# Patient Record
Sex: FEMALE | Race: BLACK OR AFRICAN AMERICAN | Employment: UNEMPLOYED | URBAN - METROPOLITAN AREA
[De-identification: names, ages, dates, MRNs, and addresses within clinical notes are randomized per-mention and may not be internally consistent; named-entity substitution may affect disease eponyms.]

---

## 2018-08-06 PROCEDURE — 99284 EMERGENCY DEPT VISIT MOD MDM: CPT | Performed by: EMERGENCY MEDICINE

## 2018-08-07 ENCOUNTER — APPOINTMENT (OUTPATIENT)
Dept: GENERAL RADIOLOGY | Age: 58
End: 2018-08-07
Attending: EMERGENCY MEDICINE
Payer: COMMERCIAL

## 2018-08-07 ENCOUNTER — HOSPITAL ENCOUNTER (EMERGENCY)
Age: 58
Discharge: HOME | End: 2018-08-07
Attending: EMERGENCY MEDICINE
Payer: COMMERCIAL

## 2018-08-07 VITALS
HEIGHT: 63 IN | TEMPERATURE: 97.9 F | HEART RATE: 100 BPM | WEIGHT: 154 LBS | SYSTOLIC BLOOD PRESSURE: 156 MMHG | BODY MASS INDEX: 27.29 KG/M2 | DIASTOLIC BLOOD PRESSURE: 85 MMHG | OXYGEN SATURATION: 100 % | RESPIRATION RATE: 18 BRPM

## 2018-08-07 DIAGNOSIS — S39.012A BACK STRAIN, INITIAL ENCOUNTER: Primary | ICD-10-CM

## 2018-08-07 LAB
BACTERIA URNS QL MICRO: 0 /HPF
CASTS URNS QL MICRO: NORMAL /LPF
EPI CELLS #/AREA URNS HPF: NORMAL /HPF
RBC #/AREA URNS HPF: NORMAL /HPF
WBC URNS QL MICRO: NORMAL /HPF

## 2018-08-07 PROCEDURE — 81003 URINALYSIS AUTO W/O SCOPE: CPT | Performed by: EMERGENCY MEDICINE

## 2018-08-07 PROCEDURE — 72100 X-RAY EXAM L-S SPINE 2/3 VWS: CPT

## 2018-08-07 PROCEDURE — 81015 MICROSCOPIC EXAM OF URINE: CPT

## 2018-08-07 PROCEDURE — 87086 URINE CULTURE/COLONY COUNT: CPT

## 2018-08-07 RX ORDER — NAPROXEN 500 MG/1
500 TABLET ORAL AS NEEDED
Qty: 20 TAB | Refills: 0 | Status: SHIPPED | OUTPATIENT
Start: 2018-08-07 | End: 2021-01-01

## 2018-08-07 RX ORDER — METAXALONE 800 MG/1
800 TABLET ORAL 3 TIMES DAILY
Qty: 20 TAB | Refills: 0 | Status: SHIPPED | OUTPATIENT
Start: 2018-08-07 | End: 2021-01-01

## 2018-08-07 NOTE — ED NOTES
I have reviewed discharge instructions with the patient. The patient verbalized understanding. Patient left ED via Discharge Method: ambulatory to Home with self. Opportunity for questions and clarification provided. Patient given 2 scripts. To continue your aftercare when you leave the hospital, you may receive an automated call from our care team to check in on how you are doing. This is a free service and part of our promise to provide the best care and service to meet your aftercare needs.  If you have questions, or wish to unsubscribe from this service please call [phone]. Thank you for Choosing our [facility] Emergency Department.

## 2018-08-07 NOTE — DISCHARGE INSTRUCTIONS
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
## 2018-08-09 LAB
BACTERIA SPEC CULT: NORMAL
BACTERIA SPEC CULT: NORMAL
SERVICE CMNT-IMP: NORMAL

## 2018-11-19 VITALS
SYSTOLIC BLOOD PRESSURE: 162 MMHG | HEIGHT: 63 IN | HEART RATE: 101 BPM | DIASTOLIC BLOOD PRESSURE: 84 MMHG | OXYGEN SATURATION: 99 % | WEIGHT: 154 LBS | TEMPERATURE: 98.3 F | RESPIRATION RATE: 18 BRPM | BODY MASS INDEX: 27.29 KG/M2

## 2018-11-19 PROCEDURE — 75810000275 HC EMERGENCY DEPT VISIT NO LEVEL OF CARE: Performed by: EMERGENCY MEDICINE

## 2018-11-20 ENCOUNTER — HOSPITAL ENCOUNTER (EMERGENCY)
Age: 58
Discharge: LEFT BEFORE DISCHARGE | End: 2018-11-20
Attending: EMERGENCY MEDICINE
Payer: SELF-PAY
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
## 2021-01-01 ENCOUNTER — APPOINTMENT (OUTPATIENT)
Dept: GENERAL RADIOLOGY | Age: 61
DRG: 720 | End: 2021-01-01
Attending: NURSE PRACTITIONER
Payer: MEDICAID

## 2021-01-01 ENCOUNTER — HOSPITAL ENCOUNTER (INPATIENT)
Age: 61
LOS: 6 days | Discharge: HOME | DRG: 194 | End: 2021-04-15
Attending: EMERGENCY MEDICINE | Admitting: INTERNAL MEDICINE
Payer: MEDICAID

## 2021-01-01 ENCOUNTER — APPOINTMENT (OUTPATIENT)
Dept: CT IMAGING | Age: 61
DRG: 137 | End: 2021-01-01
Attending: EMERGENCY MEDICINE
Payer: MEDICAID

## 2021-01-01 ENCOUNTER — HOSPITAL ENCOUNTER (INPATIENT)
Age: 61
LOS: 1 days | DRG: 720 | End: 2021-09-22
Attending: EMERGENCY MEDICINE | Admitting: STUDENT IN AN ORGANIZED HEALTH CARE EDUCATION/TRAINING PROGRAM
Payer: MEDICAID

## 2021-01-01 ENCOUNTER — HOSPITAL ENCOUNTER (INPATIENT)
Age: 61
LOS: 2 days | Discharge: HOME | DRG: 137 | End: 2021-09-19
Attending: EMERGENCY MEDICINE | Admitting: HOSPITALIST
Payer: MEDICAID

## 2021-01-01 ENCOUNTER — APPOINTMENT (OUTPATIENT)
Dept: GENERAL RADIOLOGY | Age: 61
DRG: 720 | End: 2021-01-01
Attending: INTERNAL MEDICINE
Payer: MEDICAID

## 2021-01-01 ENCOUNTER — APPOINTMENT (OUTPATIENT)
Dept: ULTRASOUND IMAGING | Age: 61
DRG: 194 | End: 2021-01-01
Attending: NURSE PRACTITIONER
Payer: MEDICAID

## 2021-01-01 ENCOUNTER — HOSPITAL ENCOUNTER (INPATIENT)
Age: 61
LOS: 2 days | Discharge: HOME | DRG: 425 | End: 2021-07-26
Attending: EMERGENCY MEDICINE | Admitting: FAMILY MEDICINE
Payer: MEDICAID

## 2021-01-01 ENCOUNTER — APPOINTMENT (OUTPATIENT)
Dept: GENERAL RADIOLOGY | Age: 61
DRG: 720 | End: 2021-01-01
Attending: EMERGENCY MEDICINE
Payer: MEDICAID

## 2021-01-01 ENCOUNTER — APPOINTMENT (OUTPATIENT)
Dept: GENERAL RADIOLOGY | Age: 61
DRG: 194 | End: 2021-01-01
Attending: EMERGENCY MEDICINE
Payer: MEDICAID

## 2021-01-01 ENCOUNTER — HOSPITAL ENCOUNTER (EMERGENCY)
Age: 61
Discharge: HOME | End: 2021-08-21
Attending: EMERGENCY MEDICINE
Payer: MEDICAID

## 2021-01-01 ENCOUNTER — APPOINTMENT (OUTPATIENT)
Dept: CT IMAGING | Age: 61
DRG: 720 | End: 2021-01-01
Attending: STUDENT IN AN ORGANIZED HEALTH CARE EDUCATION/TRAINING PROGRAM
Payer: MEDICAID

## 2021-01-01 ENCOUNTER — APPOINTMENT (OUTPATIENT)
Dept: INTERVENTIONAL RADIOLOGY/VASCULAR | Age: 61
DRG: 194 | End: 2021-01-01
Attending: INTERNAL MEDICINE
Payer: MEDICAID

## 2021-01-01 ENCOUNTER — APPOINTMENT (OUTPATIENT)
Dept: GENERAL RADIOLOGY | Age: 61
DRG: 137 | End: 2021-01-01
Attending: EMERGENCY MEDICINE
Payer: MEDICAID

## 2021-01-01 VITALS
DIASTOLIC BLOOD PRESSURE: 67 MMHG | HEIGHT: 63 IN | SYSTOLIC BLOOD PRESSURE: 127 MMHG | RESPIRATION RATE: 16 BRPM | HEART RATE: 88 BPM | TEMPERATURE: 97.4 F | WEIGHT: 154 LBS | BODY MASS INDEX: 27.29 KG/M2 | OXYGEN SATURATION: 98 %

## 2021-01-01 VITALS
RESPIRATION RATE: 16 BRPM | HEIGHT: 63 IN | BODY MASS INDEX: 27.29 KG/M2 | SYSTOLIC BLOOD PRESSURE: 134 MMHG | WEIGHT: 154 LBS | HEART RATE: 75 BPM | TEMPERATURE: 98.5 F | DIASTOLIC BLOOD PRESSURE: 66 MMHG | OXYGEN SATURATION: 92 %

## 2021-01-01 VITALS
BODY MASS INDEX: 24.8 KG/M2 | HEIGHT: 63 IN | TEMPERATURE: 98.4 F | DIASTOLIC BLOOD PRESSURE: 26 MMHG | SYSTOLIC BLOOD PRESSURE: 41 MMHG | RESPIRATION RATE: 5 BRPM | WEIGHT: 140 LBS | OXYGEN SATURATION: 76 %

## 2021-01-01 VITALS
HEIGHT: 63 IN | RESPIRATION RATE: 17 BRPM | SYSTOLIC BLOOD PRESSURE: 168 MMHG | BODY MASS INDEX: 24.77 KG/M2 | WEIGHT: 139.77 LBS | HEART RATE: 98 BPM | OXYGEN SATURATION: 97 % | DIASTOLIC BLOOD PRESSURE: 87 MMHG | TEMPERATURE: 98.8 F

## 2021-01-01 VITALS
HEART RATE: 78 BPM | OXYGEN SATURATION: 98 % | HEIGHT: 63 IN | RESPIRATION RATE: 19 BRPM | WEIGHT: 134.2 LBS | SYSTOLIC BLOOD PRESSURE: 128 MMHG | DIASTOLIC BLOOD PRESSURE: 72 MMHG | BODY MASS INDEX: 23.78 KG/M2 | TEMPERATURE: 97.9 F

## 2021-01-01 DIAGNOSIS — Z99.2 DIALYSIS PATIENT (HCC): ICD-10-CM

## 2021-01-01 DIAGNOSIS — N18.6 CONTROLLED TYPE 2 DIABETES MELLITUS WITH CHRONIC KIDNEY DISEASE ON CHRONIC DIALYSIS, UNSPECIFIED WHETHER LONG TERM INSULIN USE (HCC): ICD-10-CM

## 2021-01-01 DIAGNOSIS — Z71.89 ADVANCED CARE PLANNING/COUNSELING DISCUSSION: ICD-10-CM

## 2021-01-01 DIAGNOSIS — A41.9 SEVERE SEPSIS WITH SEPTIC SHOCK (HCC): ICD-10-CM

## 2021-01-01 DIAGNOSIS — U07.1 COVID-19: Primary | ICD-10-CM

## 2021-01-01 DIAGNOSIS — R09.02 HYPOXIA: ICD-10-CM

## 2021-01-01 DIAGNOSIS — Z51.5 ENCOUNTER FOR PALLIATIVE CARE: ICD-10-CM

## 2021-01-01 DIAGNOSIS — Z99.2 CONTROLLED TYPE 2 DIABETES MELLITUS WITH CHRONIC KIDNEY DISEASE ON CHRONIC DIALYSIS, UNSPECIFIED WHETHER LONG TERM INSULIN USE (HCC): ICD-10-CM

## 2021-01-01 DIAGNOSIS — R54 FRAILTY: ICD-10-CM

## 2021-01-01 DIAGNOSIS — K52.9 COLITIS: ICD-10-CM

## 2021-01-01 DIAGNOSIS — R65.21 SEVERE SEPSIS WITH SEPTIC SHOCK (HCC): ICD-10-CM

## 2021-01-01 DIAGNOSIS — E87.5 ACUTE HYPERKALEMIA: ICD-10-CM

## 2021-01-01 DIAGNOSIS — J96.01 ACUTE HYPOXEMIC RESPIRATORY FAILURE (HCC): ICD-10-CM

## 2021-01-01 DIAGNOSIS — N18.9 CHRONIC RENAL FAILURE, UNSPECIFIED CKD STAGE: ICD-10-CM

## 2021-01-01 DIAGNOSIS — E87.20 LACTIC ACIDOSIS: ICD-10-CM

## 2021-01-01 DIAGNOSIS — R65.20 SEVERE SEPSIS (HCC): Primary | ICD-10-CM

## 2021-01-01 DIAGNOSIS — Z99.2 ESRD NEEDING DIALYSIS (HCC): ICD-10-CM

## 2021-01-01 DIAGNOSIS — E11.22 CONTROLLED TYPE 2 DIABETES MELLITUS WITH CHRONIC KIDNEY DISEASE ON CHRONIC DIALYSIS, UNSPECIFIED WHETHER LONG TERM INSULIN USE (HCC): ICD-10-CM

## 2021-01-01 DIAGNOSIS — Z51.5 END OF LIFE CARE: ICD-10-CM

## 2021-01-01 DIAGNOSIS — I16.0 HYPERTENSIVE URGENCY: Primary | ICD-10-CM

## 2021-01-01 DIAGNOSIS — J81.0 ACUTE PULMONARY EDEMA (HCC): ICD-10-CM

## 2021-01-01 DIAGNOSIS — U07.1 PNEUMONIA DUE TO COVID-19 VIRUS: ICD-10-CM

## 2021-01-01 DIAGNOSIS — R73.9 HYPERGLYCEMIA: Primary | ICD-10-CM

## 2021-01-01 DIAGNOSIS — D72.19 OTHER EOSINOPHILIA: ICD-10-CM

## 2021-01-01 DIAGNOSIS — A41.9 SEVERE SEPSIS (HCC): Primary | ICD-10-CM

## 2021-01-01 DIAGNOSIS — J12.82 PNEUMONIA DUE TO COVID-19 VIRUS: ICD-10-CM

## 2021-01-01 DIAGNOSIS — R10.9 ACUTE ABDOMINAL PAIN: ICD-10-CM

## 2021-01-01 DIAGNOSIS — R06.00 DYSPNEA, UNSPECIFIED TYPE: ICD-10-CM

## 2021-01-01 DIAGNOSIS — G62.9 NEUROPATHY: Primary | ICD-10-CM

## 2021-01-01 DIAGNOSIS — N18.6 ESRD NEEDING DIALYSIS (HCC): ICD-10-CM

## 2021-01-01 DIAGNOSIS — N18.6 ESRD (END STAGE RENAL DISEASE) (HCC): ICD-10-CM

## 2021-01-01 LAB
25(OH)D3 SERPL-MCNC: 26.6 NG/ML (ref 30–100)
ABO + RH BLD: NORMAL
ACC. NO. FROM MICRO ORDER, ACCP: ABNORMAL
ALBUMIN SERPL-MCNC: 1.7 G/DL (ref 3.2–4.6)
ALBUMIN SERPL-MCNC: 2.7 G/DL (ref 3.2–4.6)
ALBUMIN SERPL-MCNC: 2.9 G/DL (ref 3.2–4.6)
ALBUMIN SERPL-MCNC: 3 G/DL (ref 3.2–4.6)
ALBUMIN SERPL-MCNC: 3.3 G/DL (ref 3.2–4.6)
ALBUMIN SERPL-MCNC: 3.4 G/DL (ref 3.2–4.6)
ALBUMIN SERPL-MCNC: 3.5 G/DL (ref 3.2–4.6)
ALBUMIN SERPL-MCNC: 3.6 G/DL (ref 3.2–4.6)
ALBUMIN/GLOB SERPL: 0.5 {RATIO} (ref 1.2–3.5)
ALBUMIN/GLOB SERPL: 0.5 {RATIO} (ref 1.2–3.5)
ALBUMIN/GLOB SERPL: 0.7 {RATIO} (ref 1.2–3.5)
ALBUMIN/GLOB SERPL: 0.8 {RATIO} (ref 1.2–3.5)
ALBUMIN/GLOB SERPL: 0.9 {RATIO} (ref 1.2–3.5)
ALP SERPL-CCNC: 105 U/L (ref 50–136)
ALP SERPL-CCNC: 129 U/L (ref 50–136)
ALP SERPL-CCNC: 43 U/L (ref 50–136)
ALP SERPL-CCNC: 65 U/L (ref 50–136)
ALP SERPL-CCNC: 72 U/L (ref 50–136)
ALP SERPL-CCNC: 81 U/L (ref 50–136)
ALP SERPL-CCNC: 87 U/L (ref 50–136)
ALP SERPL-CCNC: 97 U/L (ref 50–136)
ALT SERPL-CCNC: 13 U/L (ref 12–65)
ALT SERPL-CCNC: 14 U/L (ref 12–65)
ALT SERPL-CCNC: 15 U/L (ref 12–65)
ALT SERPL-CCNC: 18 U/L (ref 12–65)
ALT SERPL-CCNC: 20 U/L (ref 12–65)
ALT SERPL-CCNC: 30 U/L (ref 12–65)
ANION GAP SERPL CALC-SCNC: 11 MMOL/L (ref 7–16)
ANION GAP SERPL CALC-SCNC: 14 MMOL/L (ref 7–16)
ANION GAP SERPL CALC-SCNC: 16 MMOL/L (ref 7–16)
ANION GAP SERPL CALC-SCNC: 17 MMOL/L (ref 7–16)
ANION GAP SERPL CALC-SCNC: 19 MMOL/L (ref 7–16)
ANION GAP SERPL CALC-SCNC: 6 MMOL/L (ref 7–16)
ANION GAP SERPL CALC-SCNC: 7 MMOL/L (ref 7–16)
ANION GAP SERPL CALC-SCNC: 7 MMOL/L (ref 7–16)
ANION GAP SERPL CALC-SCNC: 8 MMOL/L (ref 7–16)
ANION GAP SERPL CALC-SCNC: 8 MMOL/L (ref 7–16)
ANION GAP SERPL CALC-SCNC: 9 MMOL/L (ref 7–16)
APPEARANCE UR: CLEAR
ARTERIAL PATENCY WRIST A: ABNORMAL
ARTERIAL PATENCY WRIST A: ABNORMAL
ARTERIAL PATENCY WRIST A: NEGATIVE
ARTERIAL PATENCY WRIST A: POSITIVE
AST SERPL-CCNC: 13 U/L (ref 15–37)
AST SERPL-CCNC: 16 U/L (ref 15–37)
AST SERPL-CCNC: 18 U/L (ref 15–37)
AST SERPL-CCNC: 26 U/L (ref 15–37)
AST SERPL-CCNC: 27 U/L (ref 15–37)
AST SERPL-CCNC: 32 U/L (ref 15–37)
AST SERPL-CCNC: 37 U/L (ref 15–37)
AST SERPL-CCNC: 86 U/L (ref 15–37)
ATRIAL RATE: 100 BPM
ATRIAL RATE: 108 BPM
ATRIAL RATE: 85 BPM
ATRIAL RATE: 88 BPM
ATRIAL RATE: 97 BPM
BACTERIA SPEC CULT: NORMAL
BACTERIA SPEC CULT: NORMAL
BACTERIA URNS QL MICRO: 0 /HPF
BACTERIA URNS QL MICRO: NORMAL /HPF
BASE DEFICIT BLD-SCNC: 6.6 MMOL/L
BASE DEFICIT BLD-SCNC: 8 MMOL/L
BASE EXCESS BLD CALC-SCNC: 0.5 MMOL/L
BASE EXCESS BLD CALC-SCNC: 4.3 MMOL/L
BASOPHILS # BLD: 0 K/UL (ref 0–0.2)
BASOPHILS # BLD: 0.1 K/UL (ref 0–0.2)
BASOPHILS NFR BLD: 0 % (ref 0–2)
BASOPHILS NFR BLD: 1 % (ref 0–2)
BDY SITE: ABNORMAL
BILIRUB SERPL-MCNC: 0.2 MG/DL (ref 0.2–1.1)
BILIRUB SERPL-MCNC: 0.3 MG/DL (ref 0.2–1.1)
BILIRUB SERPL-MCNC: 0.3 MG/DL (ref 0.2–1.1)
BILIRUB SERPL-MCNC: 0.4 MG/DL (ref 0.2–1.1)
BILIRUB SERPL-MCNC: 0.5 MG/DL (ref 0.2–1.1)
BILIRUB SERPL-MCNC: 0.5 MG/DL (ref 0.2–1.1)
BILIRUB SERPL-MCNC: 0.8 MG/DL (ref 0.2–1.1)
BILIRUB SERPL-MCNC: 0.9 MG/DL (ref 0.2–1.1)
BILIRUB UR QL: NEGATIVE
BLD PROD TYP BPU: NORMAL
BLOOD GROUP ANTIBODIES SERPL: NORMAL
BNP SERPL-MCNC: ABNORMAL PG/ML (ref 5–125)
BPU ID: NORMAL
BUN SERPL-MCNC: 101 MG/DL (ref 8–23)
BUN SERPL-MCNC: 15 MG/DL (ref 8–23)
BUN SERPL-MCNC: 16 MG/DL (ref 8–23)
BUN SERPL-MCNC: 25 MG/DL (ref 8–23)
BUN SERPL-MCNC: 27 MG/DL (ref 8–23)
BUN SERPL-MCNC: 39 MG/DL (ref 8–23)
BUN SERPL-MCNC: 39 MG/DL (ref 8–23)
BUN SERPL-MCNC: 46 MG/DL (ref 8–23)
BUN SERPL-MCNC: 51 MG/DL (ref 8–23)
BUN SERPL-MCNC: 54 MG/DL (ref 8–23)
BUN SERPL-MCNC: 54 MG/DL (ref 8–23)
BUN SERPL-MCNC: 55 MG/DL (ref 8–23)
BUN SERPL-MCNC: 57 MG/DL (ref 8–23)
BUN SERPL-MCNC: 58 MG/DL (ref 8–23)
BUN SERPL-MCNC: 59 MG/DL (ref 8–23)
BUN SERPL-MCNC: 63 MG/DL (ref 8–23)
CA-I BLD-MCNC: 0.76 MMOL/L (ref 1.12–1.32)
CALCIUM SERPL-MCNC: 7 MG/DL (ref 8.3–10.4)
CALCIUM SERPL-MCNC: 7.2 MG/DL (ref 8.3–10.4)
CALCIUM SERPL-MCNC: 7.3 MG/DL (ref 8.3–10.4)
CALCIUM SERPL-MCNC: 7.5 MG/DL (ref 8.3–10.4)
CALCIUM SERPL-MCNC: 7.5 MG/DL (ref 8.3–10.4)
CALCIUM SERPL-MCNC: 7.9 MG/DL (ref 8.3–10.4)
CALCIUM SERPL-MCNC: 8 MG/DL (ref 8.3–10.4)
CALCIUM SERPL-MCNC: 8.1 MG/DL (ref 8.3–10.4)
CALCIUM SERPL-MCNC: 8.1 MG/DL (ref 8.3–10.4)
CALCIUM SERPL-MCNC: 8.2 MG/DL (ref 8.3–10.4)
CALCIUM SERPL-MCNC: 8.3 MG/DL (ref 8.3–10.4)
CALCIUM SERPL-MCNC: 8.4 MG/DL (ref 8.3–10.4)
CALCIUM SERPL-MCNC: 8.4 MG/DL (ref 8.3–10.4)
CALCULATED P AXIS, ECG09: 71 DEGREES
CALCULATED P AXIS, ECG09: 81 DEGREES
CALCULATED P AXIS, ECG09: 82 DEGREES
CALCULATED P AXIS, ECG09: 84 DEGREES
CALCULATED P AXIS, ECG09: 89 DEGREES
CALCULATED R AXIS, ECG10: -2 DEGREES
CALCULATED R AXIS, ECG10: -9 DEGREES
CALCULATED R AXIS, ECG10: 11 DEGREES
CALCULATED R AXIS, ECG10: 21 DEGREES
CALCULATED R AXIS, ECG10: 3 DEGREES
CALCULATED T AXIS, ECG11: 135 DEGREES
CALCULATED T AXIS, ECG11: 66 DEGREES
CALCULATED T AXIS, ECG11: 71 DEGREES
CALCULATED T AXIS, ECG11: 86 DEGREES
CALCULATED T AXIS, ECG11: 89 DEGREES
CASTS URNS QL MICRO: 0 /LPF
CASTS URNS QL MICRO: ABNORMAL /LPF
CHLORIDE SERPL-SCNC: 100 MMOL/L (ref 98–107)
CHLORIDE SERPL-SCNC: 102 MMOL/L (ref 98–107)
CHLORIDE SERPL-SCNC: 104 MMOL/L (ref 98–107)
CHLORIDE SERPL-SCNC: 105 MMOL/L (ref 98–107)
CHLORIDE SERPL-SCNC: 105 MMOL/L (ref 98–107)
CHLORIDE SERPL-SCNC: 106 MMOL/L (ref 98–107)
CHLORIDE SERPL-SCNC: 106 MMOL/L (ref 98–107)
CHLORIDE SERPL-SCNC: 107 MMOL/L (ref 98–107)
CHLORIDE SERPL-SCNC: 109 MMOL/L (ref 98–107)
CHLORIDE SERPL-SCNC: 109 MMOL/L (ref 98–107)
CHLORIDE SERPL-SCNC: 91 MMOL/L (ref 98–107)
CHLORIDE SERPL-SCNC: 93 MMOL/L (ref 98–107)
CHLORIDE SERPL-SCNC: 94 MMOL/L (ref 98–107)
CHLORIDE SERPL-SCNC: 95 MMOL/L (ref 98–107)
CK SERPL-CCNC: 388 U/L (ref 21–215)
CO2 BLD-SCNC: 26 MMOL/L (ref 13–23)
CO2 BLD-SCNC: 28 MMOL/L (ref 13–23)
CO2 SERPL-SCNC: 19 MMOL/L (ref 21–32)
CO2 SERPL-SCNC: 20 MMOL/L (ref 21–32)
CO2 SERPL-SCNC: 20 MMOL/L (ref 21–32)
CO2 SERPL-SCNC: 21 MMOL/L (ref 21–32)
CO2 SERPL-SCNC: 23 MMOL/L (ref 21–32)
CO2 SERPL-SCNC: 23 MMOL/L (ref 21–32)
CO2 SERPL-SCNC: 24 MMOL/L (ref 21–32)
CO2 SERPL-SCNC: 24 MMOL/L (ref 21–32)
CO2 SERPL-SCNC: 25 MMOL/L (ref 21–32)
CO2 SERPL-SCNC: 26 MMOL/L (ref 21–32)
CO2 SERPL-SCNC: 28 MMOL/L (ref 21–32)
CO2 SERPL-SCNC: 29 MMOL/L (ref 21–32)
CO2 SERPL-SCNC: 31 MMOL/L (ref 21–32)
COLOR UR: YELLOW
COVID-19 RAPID TEST, COVR: DETECTED
COVID-19 RAPID TEST, COVR: NOT DETECTED
CREAT SERPL-MCNC: 13.3 MG/DL (ref 0.6–1)
CREAT SERPL-MCNC: 3.7 MG/DL (ref 0.6–1)
CREAT SERPL-MCNC: 4 MG/DL (ref 0.6–1)
CREAT SERPL-MCNC: 4.76 MG/DL (ref 0.6–1)
CREAT SERPL-MCNC: 5.1 MG/DL (ref 0.6–1)
CREAT SERPL-MCNC: 6.53 MG/DL (ref 0.6–1)
CREAT SERPL-MCNC: 7.27 MG/DL (ref 0.6–1)
CREAT SERPL-MCNC: 7.41 MG/DL (ref 0.6–1)
CREAT SERPL-MCNC: 7.53 MG/DL (ref 0.6–1)
CREAT SERPL-MCNC: 7.7 MG/DL (ref 0.6–1)
CREAT SERPL-MCNC: 7.86 MG/DL (ref 0.6–1)
CREAT SERPL-MCNC: 7.97 MG/DL (ref 0.6–1)
CREAT SERPL-MCNC: 8.06 MG/DL (ref 0.6–1)
CREAT SERPL-MCNC: 8.25 MG/DL (ref 0.6–1)
CREAT SERPL-MCNC: 8.46 MG/DL (ref 0.6–1)
CREAT SERPL-MCNC: 8.49 MG/DL (ref 0.6–1)
CROSSMATCH RESULT,%XM: NORMAL
CRP SERPL-MCNC: 16 MG/DL (ref 0–0.9)
CRYSTALS URNS QL MICRO: 0 /LPF
DIAGNOSIS, 93000: NORMAL
DIFFERENTIAL METHOD BLD: ABNORMAL
DIFFERENTIAL METHOD BLD: NORMAL
EOSINOPHIL # BLD: 0 K/UL (ref 0–0.8)
EOSINOPHIL # BLD: 0.1 K/UL (ref 0–0.8)
EOSINOPHIL # BLD: 0.2 K/UL (ref 0–0.8)
EOSINOPHIL # BLD: 2.8 K/UL (ref 0–0.8)
EOSINOPHIL NFR BLD: 0 % (ref 0.5–7.8)
EOSINOPHIL NFR BLD: 1 % (ref 0.5–7.8)
EOSINOPHIL NFR BLD: 2 % (ref 0.5–7.8)
EOSINOPHIL NFR BLD: 20 % (ref 0.5–7.8)
EOSINOPHIL NFR BLD: 3 % (ref 0.5–7.8)
EPI CELLS #/AREA URNS HPF: ABNORMAL /HPF
EPI CELLS #/AREA URNS HPF: NORMAL /HPF
ERYTHROCYTE [DISTWIDTH] IN BLOOD BY AUTOMATED COUNT: 13.4 % (ref 11.9–14.6)
ERYTHROCYTE [DISTWIDTH] IN BLOOD BY AUTOMATED COUNT: 13.7 % (ref 11.9–14.6)
ERYTHROCYTE [DISTWIDTH] IN BLOOD BY AUTOMATED COUNT: 13.7 % (ref 11.9–14.6)
ERYTHROCYTE [DISTWIDTH] IN BLOOD BY AUTOMATED COUNT: 14 % (ref 11.9–14.6)
ERYTHROCYTE [DISTWIDTH] IN BLOOD BY AUTOMATED COUNT: 14.3 % (ref 11.9–14.6)
ERYTHROCYTE [DISTWIDTH] IN BLOOD BY AUTOMATED COUNT: 14.4 % (ref 11.9–14.6)
ERYTHROCYTE [DISTWIDTH] IN BLOOD BY AUTOMATED COUNT: 14.6 % (ref 11.9–14.6)
ERYTHROCYTE [DISTWIDTH] IN BLOOD BY AUTOMATED COUNT: 14.7 % (ref 11.9–14.6)
ERYTHROCYTE [DISTWIDTH] IN BLOOD BY AUTOMATED COUNT: 14.7 % (ref 11.9–14.6)
ERYTHROCYTE [DISTWIDTH] IN BLOOD BY AUTOMATED COUNT: 14.9 % (ref 11.9–14.6)
ERYTHROCYTE [DISTWIDTH] IN BLOOD BY AUTOMATED COUNT: 15.7 % (ref 11.9–14.6)
ERYTHROCYTE [DISTWIDTH] IN BLOOD BY AUTOMATED COUNT: 18.5 % (ref 11.9–14.6)
ERYTHROCYTE [DISTWIDTH] IN BLOOD BY AUTOMATED COUNT: 18.6 % (ref 11.9–14.6)
ERYTHROCYTE [DISTWIDTH] IN BLOOD BY AUTOMATED COUNT: 18.8 % (ref 11.9–14.6)
ERYTHROCYTE [DISTWIDTH] IN BLOOD BY AUTOMATED COUNT: 19.4 % (ref 11.9–14.6)
EST. AVERAGE GLUCOSE BLD GHB EST-MCNC: 154 MG/DL
FERRITIN SERPL-MCNC: 240 NG/ML (ref 8–388)
FIO2 ON VENT: 100 %
FOLATE SERPL-MCNC: 6.8 NG/ML (ref 3.1–17.5)
GAS FLOW.O2 O2 DELIVERY SYS: ABNORMAL L/MIN
GLOBULIN SER CALC-MCNC: 3.4 G/DL (ref 2.3–3.5)
GLOBULIN SER CALC-MCNC: 3.7 G/DL (ref 2.3–3.5)
GLOBULIN SER CALC-MCNC: 4.2 G/DL (ref 2.3–3.5)
GLOBULIN SER CALC-MCNC: 4.4 G/DL (ref 2.3–3.5)
GLOBULIN SER CALC-MCNC: 4.8 G/DL (ref 2.3–3.5)
GLOBULIN SER CALC-MCNC: 4.8 G/DL (ref 2.3–3.5)
GLOBULIN SER CALC-MCNC: 5.2 G/DL (ref 2.3–3.5)
GLOBULIN SER CALC-MCNC: 5.3 G/DL (ref 2.3–3.5)
GLUCOSE BLD STRIP.AUTO-MCNC: 104 MG/DL (ref 65–100)
GLUCOSE BLD STRIP.AUTO-MCNC: 108 MG/DL (ref 65–100)
GLUCOSE BLD STRIP.AUTO-MCNC: 109 MG/DL (ref 65–100)
GLUCOSE BLD STRIP.AUTO-MCNC: 115 MG/DL (ref 65–100)
GLUCOSE BLD STRIP.AUTO-MCNC: 147 MG/DL (ref 65–100)
GLUCOSE BLD STRIP.AUTO-MCNC: 147 MG/DL (ref 65–100)
GLUCOSE BLD STRIP.AUTO-MCNC: 148 MG/DL (ref 65–100)
GLUCOSE BLD STRIP.AUTO-MCNC: 150 MG/DL (ref 65–100)
GLUCOSE BLD STRIP.AUTO-MCNC: 156 MG/DL (ref 65–100)
GLUCOSE BLD STRIP.AUTO-MCNC: 157 MG/DL (ref 65–100)
GLUCOSE BLD STRIP.AUTO-MCNC: 162 MG/DL (ref 65–100)
GLUCOSE BLD STRIP.AUTO-MCNC: 163 MG/DL (ref 65–100)
GLUCOSE BLD STRIP.AUTO-MCNC: 173 MG/DL (ref 65–100)
GLUCOSE BLD STRIP.AUTO-MCNC: 176 MG/DL (ref 65–100)
GLUCOSE BLD STRIP.AUTO-MCNC: 180 MG/DL (ref 65–100)
GLUCOSE BLD STRIP.AUTO-MCNC: 182 MG/DL (ref 65–100)
GLUCOSE BLD STRIP.AUTO-MCNC: 186 MG/DL (ref 65–100)
GLUCOSE BLD STRIP.AUTO-MCNC: 188 MG/DL (ref 65–100)
GLUCOSE BLD STRIP.AUTO-MCNC: 194 MG/DL (ref 65–100)
GLUCOSE BLD STRIP.AUTO-MCNC: 208 MG/DL (ref 65–100)
GLUCOSE BLD STRIP.AUTO-MCNC: 223 MG/DL (ref 65–100)
GLUCOSE BLD STRIP.AUTO-MCNC: 229 MG/DL (ref 65–100)
GLUCOSE BLD STRIP.AUTO-MCNC: 238 MG/DL (ref 65–100)
GLUCOSE BLD STRIP.AUTO-MCNC: 242 MG/DL (ref 65–100)
GLUCOSE BLD STRIP.AUTO-MCNC: 244 MG/DL (ref 65–100)
GLUCOSE BLD STRIP.AUTO-MCNC: 246 MG/DL (ref 65–100)
GLUCOSE BLD STRIP.AUTO-MCNC: 255 MG/DL (ref 65–100)
GLUCOSE BLD STRIP.AUTO-MCNC: 261 MG/DL (ref 65–100)
GLUCOSE BLD STRIP.AUTO-MCNC: 267 MG/DL (ref 65–100)
GLUCOSE BLD STRIP.AUTO-MCNC: 277 MG/DL (ref 65–100)
GLUCOSE BLD STRIP.AUTO-MCNC: 282 MG/DL (ref 65–100)
GLUCOSE BLD STRIP.AUTO-MCNC: 286 MG/DL (ref 65–100)
GLUCOSE BLD STRIP.AUTO-MCNC: 287 MG/DL (ref 65–100)
GLUCOSE BLD STRIP.AUTO-MCNC: 302 MG/DL (ref 65–100)
GLUCOSE BLD STRIP.AUTO-MCNC: 313 MG/DL (ref 65–100)
GLUCOSE BLD STRIP.AUTO-MCNC: 314 MG/DL (ref 65–100)
GLUCOSE BLD STRIP.AUTO-MCNC: 352 MG/DL (ref 65–100)
GLUCOSE BLD STRIP.AUTO-MCNC: 388 MG/DL (ref 65–100)
GLUCOSE BLD STRIP.AUTO-MCNC: 55 MG/DL (ref 65–100)
GLUCOSE BLD STRIP.AUTO-MCNC: 58 MG/DL (ref 65–100)
GLUCOSE BLD STRIP.AUTO-MCNC: 66 MG/DL (ref 65–100)
GLUCOSE BLD STRIP.AUTO-MCNC: 81 MG/DL (ref 65–100)
GLUCOSE BLD STRIP.AUTO-MCNC: 86 MG/DL (ref 65–100)
GLUCOSE SERPL-MCNC: 134 MG/DL (ref 65–100)
GLUCOSE SERPL-MCNC: 146 MG/DL (ref 65–100)
GLUCOSE SERPL-MCNC: 172 MG/DL (ref 65–100)
GLUCOSE SERPL-MCNC: 176 MG/DL (ref 65–100)
GLUCOSE SERPL-MCNC: 183 MG/DL (ref 65–100)
GLUCOSE SERPL-MCNC: 187 MG/DL (ref 65–100)
GLUCOSE SERPL-MCNC: 189 MG/DL (ref 65–100)
GLUCOSE SERPL-MCNC: 192 MG/DL (ref 65–100)
GLUCOSE SERPL-MCNC: 196 MG/DL (ref 65–100)
GLUCOSE SERPL-MCNC: 215 MG/DL (ref 65–100)
GLUCOSE SERPL-MCNC: 231 MG/DL (ref 65–100)
GLUCOSE SERPL-MCNC: 236 MG/DL (ref 65–100)
GLUCOSE SERPL-MCNC: 318 MG/DL (ref 65–100)
GLUCOSE SERPL-MCNC: 369 MG/DL (ref 65–100)
GLUCOSE SERPL-MCNC: 90 MG/DL (ref 65–100)
GLUCOSE SERPL-MCNC: 93 MG/DL (ref 65–100)
GLUCOSE UR STRIP.AUTO-MCNC: 250 MG/DL
HBA1C MFR BLD: 7 % (ref 4.2–6.3)
HBV CORE IGM SER QL: NONREACTIVE
HBV SURFACE AB SERPL IA-ACNC: 3.19 MIU/ML
HBV SURFACE AG SER QL: NONREACTIVE
HBV SURFACE AG SER QL: NONREACTIVE
HCO3 BLD-SCNC: 20 MMOL/L (ref 22–26)
HCO3 BLD-SCNC: 21.1 MMOL/L (ref 22–26)
HCO3 BLD-SCNC: 25.9 MMOL/L (ref 22–26)
HCO3 BLD-SCNC: 28.1 MMOL/L (ref 22–26)
HCT VFR BLD AUTO: 21.1 % (ref 35.8–46.3)
HCT VFR BLD AUTO: 22.2 % (ref 35.8–46.3)
HCT VFR BLD AUTO: 23.5 % (ref 35.8–46.3)
HCT VFR BLD AUTO: 24.3 % (ref 35.8–46.3)
HCT VFR BLD AUTO: 25.1 % (ref 35.8–46.3)
HCT VFR BLD AUTO: 26.1 % (ref 35.8–46.3)
HCT VFR BLD AUTO: 27.1 % (ref 35.8–46.3)
HCT VFR BLD AUTO: 27.2 % (ref 35.8–46.3)
HCT VFR BLD AUTO: 27.7 % (ref 35.8–46.3)
HCT VFR BLD AUTO: 28.7 % (ref 35.8–46.3)
HCT VFR BLD AUTO: 30.6 % (ref 35.8–46.3)
HCT VFR BLD AUTO: 31.7 % (ref 35.8–46.3)
HCT VFR BLD AUTO: 32.5 % (ref 35.8–46.3)
HCT VFR BLD AUTO: 34.4 % (ref 35.8–46.3)
HCT VFR BLD AUTO: 34.8 % (ref 35.8–46.3)
HCT VFR BLD AUTO: 38.8 % (ref 35.8–46.3)
HCV GENOTYPE: NORMAL
HCV RNA SERPL NAA+PROBE-ACNC: NORMAL IU/ML
HCV RNA SERPL NAA+PROBE-ACNC: NORMAL IU/ML
HCV RNA SERPL NAA+PROBE-LOG IU: NORMAL LOG10 IU/ML
HCV RNA SERPL NAA+PROBE-LOG IU: NORMAL LOG10 IU/ML
HGB BLD-MCNC: 10.4 G/DL (ref 11.7–15.4)
HGB BLD-MCNC: 10.5 G/DL (ref 11.7–15.4)
HGB BLD-MCNC: 10.9 G/DL (ref 11.7–15.4)
HGB BLD-MCNC: 11.1 G/DL (ref 11.7–15.4)
HGB BLD-MCNC: 12.4 G/DL (ref 11.7–15.4)
HGB BLD-MCNC: 6.8 G/DL (ref 11.7–15.4)
HGB BLD-MCNC: 7.1 G/DL (ref 11.7–15.4)
HGB BLD-MCNC: 7.8 G/DL (ref 11.7–15.4)
HGB BLD-MCNC: 8 G/DL (ref 11.7–15.4)
HGB BLD-MCNC: 8.1 G/DL (ref 11.7–15.4)
HGB BLD-MCNC: 8.6 G/DL (ref 11.7–15.4)
HGB BLD-MCNC: 8.7 G/DL (ref 11.7–15.4)
HGB BLD-MCNC: 8.8 G/DL (ref 11.7–15.4)
HGB BLD-MCNC: 9 G/DL (ref 11.7–15.4)
HGB BLD-MCNC: 9.1 G/DL (ref 11.7–15.4)
HGB BLD-MCNC: 9.9 G/DL (ref 11.7–15.4)
HGB UR QL STRIP: ABNORMAL
HISTORY CHECKED?,CKHIST: NORMAL
IMM GRANULOCYTES # BLD AUTO: 0 K/UL (ref 0–0.5)
IMM GRANULOCYTES # BLD AUTO: 0.8 K/UL (ref 0–0.5)
IMM GRANULOCYTES NFR BLD AUTO: 0 % (ref 0–5)
IMM GRANULOCYTES NFR BLD AUTO: 1 % (ref 0–5)
IMM GRANULOCYTES NFR BLD AUTO: 6 % (ref 0–5)
INSPIRATION.DURATION SETTING TIME VENT: 1 SEC
INTERPRETATION: ABNORMAL
IRON SATN MFR SERPL: 16 %
IRON SATN MFR SERPL: 21 %
IRON SERPL-MCNC: 39 UG/DL (ref 35–150)
IRON SERPL-MCNC: 48 UG/DL (ref 35–150)
KETONES UR QL STRIP.AUTO: NEGATIVE MG/DL
KPC (CARBAPENEM RESISTANCE GENE): NOT DETECTED
LACTATE SERPL-SCNC: 1.1 MMOL/L (ref 0.4–2)
LACTATE SERPL-SCNC: 2.5 MMOL/L (ref 0.4–2)
LACTATE SERPL-SCNC: 2.7 MMOL/L (ref 0.4–2)
LACTATE SERPL-SCNC: 3.7 MMOL/L (ref 0.4–2)
LACTATE SERPL-SCNC: 4.3 MMOL/L (ref 0.4–2)
LEUKOCYTE ESTERASE UR QL STRIP.AUTO: NEGATIVE
LIPASE SERPL-CCNC: 473 U/L (ref 73–393)
LIPASE SERPL-CCNC: 94 U/L (ref 73–393)
LYMPHOCYTES # BLD: 1 K/UL (ref 0.5–4.6)
LYMPHOCYTES # BLD: 1.2 K/UL (ref 0.5–4.6)
LYMPHOCYTES # BLD: 1.3 K/UL (ref 0.5–4.6)
LYMPHOCYTES # BLD: 1.5 K/UL (ref 0.5–4.6)
LYMPHOCYTES # BLD: 1.6 K/UL (ref 0.5–4.6)
LYMPHOCYTES # BLD: 1.7 K/UL (ref 0.5–4.6)
LYMPHOCYTES # BLD: 1.8 K/UL (ref 0.5–4.6)
LYMPHOCYTES # BLD: 2 K/UL (ref 0.5–4.6)
LYMPHOCYTES # BLD: 2.1 K/UL (ref 0.5–4.6)
LYMPHOCYTES # BLD: 2.6 K/UL (ref 0.5–4.6)
LYMPHOCYTES NFR BLD: 15 % (ref 13–44)
LYMPHOCYTES NFR BLD: 19 % (ref 13–44)
LYMPHOCYTES NFR BLD: 20 % (ref 13–44)
LYMPHOCYTES NFR BLD: 21 % (ref 13–44)
LYMPHOCYTES NFR BLD: 26 % (ref 13–44)
LYMPHOCYTES NFR BLD: 27 % (ref 13–44)
LYMPHOCYTES NFR BLD: 27 % (ref 13–44)
LYMPHOCYTES NFR BLD: 29 % (ref 13–44)
LYMPHOCYTES NFR BLD: 39 % (ref 13–44)
LYMPHOCYTES NFR BLD: 9 % (ref 13–44)
MAGNESIUM SERPL-MCNC: 1.4 MG/DL (ref 1.8–2.4)
MAGNESIUM SERPL-MCNC: 1.7 MG/DL (ref 1.8–2.4)
MAGNESIUM SERPL-MCNC: 1.8 MG/DL (ref 1.8–2.4)
MAGNESIUM SERPL-MCNC: 1.9 MG/DL (ref 1.8–2.4)
MAGNESIUM SERPL-MCNC: 2 MG/DL (ref 1.8–2.4)
MAGNESIUM SERPL-MCNC: 2.2 MG/DL (ref 1.8–2.4)
MAGNESIUM SERPL-MCNC: 2.3 MG/DL (ref 1.8–2.4)
MCH RBC QN AUTO: 28.6 PG (ref 26.1–32.9)
MCH RBC QN AUTO: 28.8 PG (ref 26.1–32.9)
MCH RBC QN AUTO: 29.1 PG (ref 26.1–32.9)
MCH RBC QN AUTO: 29.2 PG (ref 26.1–32.9)
MCH RBC QN AUTO: 29.5 PG (ref 26.1–32.9)
MCH RBC QN AUTO: 29.6 PG (ref 26.1–32.9)
MCH RBC QN AUTO: 30.7 PG (ref 26.1–32.9)
MCH RBC QN AUTO: 30.8 PG (ref 26.1–32.9)
MCH RBC QN AUTO: 31.1 PG (ref 26.1–32.9)
MCH RBC QN AUTO: 31.1 PG (ref 26.1–32.9)
MCH RBC QN AUTO: 31.2 PG (ref 26.1–32.9)
MCH RBC QN AUTO: 31.4 PG (ref 26.1–32.9)
MCH RBC QN AUTO: 32.1 PG (ref 26.1–32.9)
MCHC RBC AUTO-ENTMCNC: 31.4 G/DL (ref 31.4–35)
MCHC RBC AUTO-ENTMCNC: 31.6 G/DL (ref 31.4–35)
MCHC RBC AUTO-ENTMCNC: 31.7 G/DL (ref 31.4–35)
MCHC RBC AUTO-ENTMCNC: 31.9 G/DL (ref 31.4–35)
MCHC RBC AUTO-ENTMCNC: 32 G/DL (ref 31.4–35)
MCHC RBC AUTO-ENTMCNC: 32.2 G/DL (ref 31.4–35)
MCHC RBC AUTO-ENTMCNC: 32.3 G/DL (ref 31.4–35)
MCHC RBC AUTO-ENTMCNC: 32.4 G/DL (ref 31.4–35)
MCHC RBC AUTO-ENTMCNC: 32.9 G/DL (ref 31.4–35)
MCHC RBC AUTO-ENTMCNC: 32.9 G/DL (ref 31.4–35)
MCHC RBC AUTO-ENTMCNC: 33.1 G/DL (ref 31.4–35)
MCHC RBC AUTO-ENTMCNC: 33.2 G/DL (ref 31.4–35)
MCHC RBC AUTO-ENTMCNC: 33.3 G/DL (ref 31.4–35)
MCV RBC AUTO: 86.8 FL (ref 79.6–97.8)
MCV RBC AUTO: 88.5 FL (ref 79.6–97.8)
MCV RBC AUTO: 89.6 FL (ref 79.6–97.8)
MCV RBC AUTO: 90.4 FL (ref 79.6–97.8)
MCV RBC AUTO: 90.8 FL (ref 79.6–97.8)
MCV RBC AUTO: 91.3 FL (ref 79.6–97.8)
MCV RBC AUTO: 92.3 FL (ref 79.6–97.8)
MCV RBC AUTO: 93.2 FL (ref 79.6–97.8)
MCV RBC AUTO: 93.2 FL (ref 79.6–97.8)
MCV RBC AUTO: 94 FL (ref 79.6–97.8)
MCV RBC AUTO: 95.5 FL (ref 79.6–97.8)
MCV RBC AUTO: 97.1 FL (ref 79.6–97.8)
MCV RBC AUTO: 97.3 FL (ref 79.6–97.8)
MCV RBC AUTO: 97.4 FL (ref 79.6–97.8)
MCV RBC AUTO: 97.6 FL (ref 79.6–97.8)
MM INDURATION POC: 0 MM (ref 0–5)
MM INDURATION POC: 0 MM (ref 0–5)
MONOCYTES # BLD: 0.5 K/UL (ref 0.1–1.3)
MONOCYTES # BLD: 0.6 K/UL (ref 0.1–1.3)
MONOCYTES # BLD: 0.6 K/UL (ref 0.1–1.3)
MONOCYTES # BLD: 0.7 K/UL (ref 0.1–1.3)
MONOCYTES # BLD: 0.8 K/UL (ref 0.1–1.3)
MONOCYTES # BLD: 1.2 K/UL (ref 0.1–1.3)
MONOCYTES NFR BLD: 10 % (ref 4–12)
MONOCYTES NFR BLD: 11 % (ref 4–12)
MONOCYTES NFR BLD: 7 % (ref 4–12)
MONOCYTES NFR BLD: 8 % (ref 4–12)
MONOCYTES NFR BLD: 8 % (ref 4–12)
MONOCYTES NFR BLD: 9 % (ref 4–12)
MUCOUS THREADS URNS QL MICRO: 0 /LPF
NEUTS SEG # BLD: 3.1 K/UL (ref 1.7–8.2)
NEUTS SEG # BLD: 3.2 K/UL (ref 1.7–8.2)
NEUTS SEG # BLD: 4 K/UL (ref 1.7–8.2)
NEUTS SEG # BLD: 4 K/UL (ref 1.7–8.2)
NEUTS SEG # BLD: 4.4 K/UL (ref 1.7–8.2)
NEUTS SEG # BLD: 4.6 K/UL (ref 1.7–8.2)
NEUTS SEG # BLD: 4.8 K/UL (ref 1.7–8.2)
NEUTS SEG # BLD: 4.9 K/UL (ref 1.7–8.2)
NEUTS SEG # BLD: 5 K/UL (ref 1.7–8.2)
NEUTS SEG # BLD: 5.1 K/UL (ref 1.7–8.2)
NEUTS SEG # BLD: 6.6 K/UL (ref 1.7–8.2)
NEUTS SEG # BLD: 7.7 K/UL (ref 1.7–8.2)
NEUTS SEG NFR BLD: 47 % (ref 43–78)
NEUTS SEG NFR BLD: 55 % (ref 43–78)
NEUTS SEG NFR BLD: 59 % (ref 43–78)
NEUTS SEG NFR BLD: 61 % (ref 43–78)
NEUTS SEG NFR BLD: 69 % (ref 43–78)
NEUTS SEG NFR BLD: 70 % (ref 43–78)
NEUTS SEG NFR BLD: 73 % (ref 43–78)
NITRITE UR QL STRIP.AUTO: NEGATIVE
NRBC # BLD: 0 K/UL (ref 0–0.2)
NRBC # BLD: 0.13 K/UL (ref 0–0.2)
NRBC # BLD: 0.4 K/UL (ref 0–0.2)
O2/TOTAL GAS SETTING VFR VENT: 100 %
OTHER OBSERVATIONS,UCOM: NORMAL
P-R INTERVAL, ECG05: 142 MS
P-R INTERVAL, ECG05: 148 MS
P-R INTERVAL, ECG05: 158 MS
P-R INTERVAL, ECG05: 164 MS
P-R INTERVAL, ECG05: 176 MS
PATH REV BLD -IMP: NORMAL
PCO2 BLD: 38.2 MMHG (ref 35–45)
PCO2 BLD: 43.9 MMHG (ref 35–45)
PCO2 BLD: 44.3 MMHG (ref 35–45)
PCO2 BLD: 61.3 MMHG (ref 35–45)
PEEP RESPIRATORY: 10 CMH2O
PEEP RESPIRATORY: 12 CMH2O
PEEP RESPIRATORY: 12 CMH2O
PEEP RESPIRATORY: 12 CM[H2O]
PH BLD: 7.14 [PH] (ref 7.35–7.45)
PH BLD: 7.26 [PH] (ref 7.35–7.45)
PH BLD: 7.38 [PH] (ref 7.35–7.45)
PH BLD: 7.48 [PH] (ref 7.35–7.45)
PH UR STRIP: 5.5 [PH] (ref 5–9)
PHOSPHATE SERPL-MCNC: 5.2 MG/DL (ref 2.3–3.7)
PHOSPHATE SERPL-MCNC: 5.8 MG/DL (ref 2.3–3.7)
PIP ISTAT,IPIP: 17
PIP ISTAT,IPIP: 20
PLATELET # BLD AUTO: 111 K/UL (ref 150–450)
PLATELET # BLD AUTO: 114 K/UL (ref 150–450)
PLATELET # BLD AUTO: 124 K/UL (ref 150–450)
PLATELET # BLD AUTO: 131 K/UL (ref 150–450)
PLATELET # BLD AUTO: 133 K/UL (ref 150–450)
PLATELET # BLD AUTO: 135 K/UL (ref 150–450)
PLATELET # BLD AUTO: 139 K/UL (ref 150–450)
PLATELET # BLD AUTO: 139 K/UL (ref 150–450)
PLATELET # BLD AUTO: 142 K/UL (ref 150–450)
PLATELET # BLD AUTO: 145 K/UL (ref 150–450)
PLATELET # BLD AUTO: 156 K/UL (ref 150–450)
PLATELET # BLD AUTO: 157 K/UL (ref 150–450)
PLATELET # BLD AUTO: 160 K/UL (ref 150–450)
PLATELET # BLD AUTO: 173 K/UL (ref 150–450)
PLATELET # BLD AUTO: 213 K/UL (ref 150–450)
PLATELET COMMENTS,PCOM: ADEQUATE
PMV BLD AUTO: 11 FL (ref 9.4–12.3)
PMV BLD AUTO: 11.1 FL (ref 9.4–12.3)
PMV BLD AUTO: 11.5 FL (ref 9.4–12.3)
PMV BLD AUTO: 11.6 FL (ref 9.4–12.3)
PMV BLD AUTO: 11.7 FL (ref 9.4–12.3)
PMV BLD AUTO: 11.7 FL (ref 9.4–12.3)
PMV BLD AUTO: 11.9 FL (ref 9.4–12.3)
PMV BLD AUTO: 12 FL (ref 9.4–12.3)
PMV BLD AUTO: 12 FL (ref 9.4–12.3)
PMV BLD AUTO: 12.1 FL (ref 9.4–12.3)
PMV BLD AUTO: 12.2 FL (ref 9.4–12.3)
PMV BLD AUTO: 12.2 FL (ref 9.4–12.3)
PMV BLD AUTO: 12.7 FL (ref 9.4–12.3)
PMV BLD AUTO: 12.8 FL (ref 9.4–12.3)
PMV BLD AUTO: 12.9 FL (ref 9.4–12.3)
PO2 BLD: 42 MMHG (ref 75–100)
PO2 BLD: 51 MMHG (ref 75–100)
PO2 BLD: 54 MMHG (ref 75–100)
PO2 BLD: 80 MMHG (ref 75–100)
POTASSIUM BLD-SCNC: 3.9 MMOL/L (ref 3.5–5.1)
POTASSIUM SERPL-SCNC: 3.6 MMOL/L (ref 3.5–5.1)
POTASSIUM SERPL-SCNC: 3.8 MMOL/L (ref 3.5–5.1)
POTASSIUM SERPL-SCNC: 3.9 MMOL/L (ref 3.5–5.1)
POTASSIUM SERPL-SCNC: 3.9 MMOL/L (ref 3.5–5.1)
POTASSIUM SERPL-SCNC: 4 MMOL/L (ref 3.5–5.1)
POTASSIUM SERPL-SCNC: 4 MMOL/L (ref 3.5–5.1)
POTASSIUM SERPL-SCNC: 4.1 MMOL/L (ref 3.5–5.1)
POTASSIUM SERPL-SCNC: 4.2 MMOL/L (ref 3.5–5.1)
POTASSIUM SERPL-SCNC: 4.3 MMOL/L (ref 3.5–5.1)
POTASSIUM SERPL-SCNC: 4.3 MMOL/L (ref 3.5–5.1)
POTASSIUM SERPL-SCNC: 4.4 MMOL/L (ref 3.5–5.1)
POTASSIUM SERPL-SCNC: 4.4 MMOL/L (ref 3.5–5.1)
POTASSIUM SERPL-SCNC: 4.5 MMOL/L (ref 3.5–5.1)
POTASSIUM SERPL-SCNC: 4.5 MMOL/L (ref 3.5–5.1)
POTASSIUM SERPL-SCNC: 5 MMOL/L (ref 3.5–5.1)
POTASSIUM SERPL-SCNC: 5.7 MMOL/L (ref 3.5–5.1)
POTASSIUM SERPL-SCNC: 6.8 MMOL/L (ref 3.5–5.1)
POTASSIUM SERPL-SCNC: 7.1 MMOL/L (ref 3.5–5.1)
PPD POC: NEGATIVE NEGATIVE
PPD POC: NEGATIVE NEGATIVE
PRESSURE SUPPORT SETTING VENT: 4 CMH2O
PROCALCITONIN SERPL-MCNC: 137.19 NG/ML
PROCALCITONIN SERPL-MCNC: 3.36 NG/ML
PROT SERPL-MCNC: 5.4 G/DL (ref 6.3–8.2)
PROT SERPL-MCNC: 6.3 G/DL (ref 6.3–8.2)
PROT SERPL-MCNC: 7.2 G/DL (ref 6.3–8.2)
PROT SERPL-MCNC: 7.8 G/DL (ref 6.3–8.2)
PROT SERPL-MCNC: 7.9 G/DL (ref 6.3–8.2)
PROT SERPL-MCNC: 8.1 G/DL (ref 6.3–8.2)
PROT SERPL-MCNC: 8.3 G/DL (ref 6.3–8.2)
PROT SERPL-MCNC: 8.9 G/DL (ref 6.3–8.2)
PROT UR STRIP-MCNC: 300 MG/DL
PSEUDOMONAS AERUGINOSA: DETECTED
PTH-INTACT SERPL-MCNC: 711.5 PG/ML (ref 18.5–88)
Q-T INTERVAL, ECG07: 346 MS
Q-T INTERVAL, ECG07: 346 MS
Q-T INTERVAL, ECG07: 370 MS
Q-T INTERVAL, ECG07: 374 MS
Q-T INTERVAL, ECG07: 422 MS
QRS DURATION, ECG06: 66 MS
QRS DURATION, ECG06: 68 MS
QRS DURATION, ECG06: 72 MS
QRS DURATION, ECG06: 74 MS
QRS DURATION, ECG06: 82 MS
QTC CALCULATION (BEZET), ECG08: 446 MS
QTC CALCULATION (BEZET), ECG08: 452 MS
QTC CALCULATION (BEZET), ECG08: 463 MS
QTC CALCULATION (BEZET), ECG08: 469 MS
QTC CALCULATION (BEZET), ECG08: 502 MS
RBC # BLD AUTO: 2.21 M/UL (ref 4.05–5.2)
RBC # BLD AUTO: 2.28 M/UL (ref 4.05–5.2)
RBC # BLD AUTO: 2.49 M/UL (ref 4.05–5.2)
RBC # BLD AUTO: 2.5 M/UL (ref 4.05–5.2)
RBC # BLD AUTO: 2.58 M/UL (ref 4.05–5.2)
RBC # BLD AUTO: 2.8 M/UL (ref 4.05–5.2)
RBC # BLD AUTO: 2.8 M/UL (ref 4.05–5.2)
RBC # BLD AUTO: 3.08 M/UL (ref 4.05–5.2)
RBC # BLD AUTO: 3.13 M/UL (ref 4.05–5.2)
RBC # BLD AUTO: 3.35 M/UL (ref 4.05–5.2)
RBC # BLD AUTO: 3.52 M/UL (ref 4.05–5.2)
RBC # BLD AUTO: 3.65 M/UL (ref 4.05–5.2)
RBC # BLD AUTO: 3.79 M/UL (ref 4.05–5.2)
RBC # BLD AUTO: 3.85 M/UL (ref 4.05–5.2)
RBC # BLD AUTO: 4.33 M/UL (ref 4.05–5.2)
RBC #/AREA URNS HPF: ABNORMAL /HPF
RBC #/AREA URNS HPF: NORMAL /HPF
RBC MORPH BLD: ABNORMAL
RESPIRATORY RATE: 32 (ref 5–40)
SAO2 % BLD: 73.3 % (ref 95–98)
SAO2 % BLD: 81 %
SAO2 % BLD: 82.3 % (ref 95–98)
SAO2 % BLD: 95.5 % (ref 95–98)
SARS-COV-2, COV2: NORMAL
SERVICE CMNT-IMP: 18.3
SERVICE CMNT-IMP: ABNORMAL
SERVICE CMNT-IMP: NORMAL
SODIUM BLD-SCNC: 147 MMOL/L (ref 136–145)
SODIUM SERPL-SCNC: 129 MMOL/L (ref 136–145)
SODIUM SERPL-SCNC: 132 MMOL/L (ref 136–145)
SODIUM SERPL-SCNC: 132 MMOL/L (ref 136–145)
SODIUM SERPL-SCNC: 133 MMOL/L (ref 136–145)
SODIUM SERPL-SCNC: 134 MMOL/L (ref 136–145)
SODIUM SERPL-SCNC: 138 MMOL/L (ref 136–145)
SODIUM SERPL-SCNC: 139 MMOL/L (ref 136–145)
SODIUM SERPL-SCNC: 140 MMOL/L (ref 136–145)
SODIUM SERPL-SCNC: 142 MMOL/L (ref 136–145)
SOURCE, COVRS: ABNORMAL
SOURCE, COVRS: NORMAL
SP GR UR REFRACTOMETRY: 1.01 (ref 1–1.02)
SPECIMEN EXP DATE BLD: NORMAL
SPECIMEN SITE: ABNORMAL
SPECIMEN TYPE: ABNORMAL
STATUS OF UNIT,%ST: NORMAL
TEST INFORMATION, 550045: NORMAL
TEST INFORMATION, 550045: NORMAL
TIBC SERPL-MCNC: 232 UG/DL (ref 250–450)
TIBC SERPL-MCNC: 243 UG/DL (ref 250–450)
TOTAL RESP. RATE, ITRR: 28
TOTAL RESP. RATE, ITRR: 33
UNIT DIVISION, %UDIV: 0
UROBILINOGEN UR QL STRIP.AUTO: 1 EU/DL (ref 0.2–1)
VENTILATION MODE VENT: ABNORMAL
VENTILATION MODE VENT: ABNORMAL
VENTRICULAR RATE, ECG03: 100 BPM
VENTRICULAR RATE, ECG03: 108 BPM
VENTRICULAR RATE, ECG03: 85 BPM
VENTRICULAR RATE, ECG03: 88 BPM
VENTRICULAR RATE, ECG03: 97 BPM
VIT B12 SERPL-MCNC: 748 PG/ML (ref 193–986)
VT SETTING VENT: 330 ML
VT SETTING VENT: 340 ML
WBC # BLD AUTO: 10 K/UL (ref 4.3–11.1)
WBC # BLD AUTO: 13.8 K/UL (ref 4.3–11.1)
WBC # BLD AUTO: 2.2 K/UL (ref 4.3–11.1)
WBC # BLD AUTO: 5.4 K/UL (ref 4.3–11.1)
WBC # BLD AUTO: 6.5 K/UL (ref 4.3–11.1)
WBC # BLD AUTO: 6.5 K/UL (ref 4.3–11.1)
WBC # BLD AUTO: 6.7 K/UL (ref 4.3–11.1)
WBC # BLD AUTO: 6.7 K/UL (ref 4.3–11.1)
WBC # BLD AUTO: 6.8 K/UL (ref 4.3–11.1)
WBC # BLD AUTO: 6.9 K/UL (ref 4.3–11.1)
WBC # BLD AUTO: 7 K/UL (ref 4.3–11.1)
WBC # BLD AUTO: 7.3 K/UL (ref 4.3–11.1)
WBC # BLD AUTO: 7.4 K/UL (ref 4.3–11.1)
WBC # BLD AUTO: 7.5 K/UL (ref 4.3–11.1)
WBC # BLD AUTO: 9.5 K/UL (ref 4.3–11.1)
WBC MORPH BLD: ABNORMAL
WBC URNS QL MICRO: ABNORMAL /HPF
WBC URNS QL MICRO: NORMAL /HPF

## 2021-01-01 PROCEDURE — 77030018719 HC DRSG PTCH ANTIMIC J&J -A

## 2021-01-01 PROCEDURE — 74011250637 HC RX REV CODE- 250/637: Performed by: HOSPITALIST

## 2021-01-01 PROCEDURE — 74011000258 HC RX REV CODE- 258: Performed by: NURSE PRACTITIONER

## 2021-01-01 PROCEDURE — 81003 URINALYSIS AUTO W/O SCOPE: CPT

## 2021-01-01 PROCEDURE — 65610000006 HC RM INTENSIVE CARE

## 2021-01-01 PROCEDURE — 82962 GLUCOSE BLOOD TEST: CPT

## 2021-01-01 PROCEDURE — 99284 EMERGENCY DEPT VISIT MOD MDM: CPT

## 2021-01-01 PROCEDURE — 74011250637 HC RX REV CODE- 250/637: Performed by: FAMILY MEDICINE

## 2021-01-01 PROCEDURE — 74011250636 HC RX REV CODE- 250/636: Performed by: INTERNAL MEDICINE

## 2021-01-01 PROCEDURE — 5A1935Z RESPIRATORY VENTILATION, LESS THAN 24 CONSECUTIVE HOURS: ICD-10-PCS | Performed by: NURSE PRACTITIONER

## 2021-01-01 PROCEDURE — C1769 GUIDE WIRE: HCPCS

## 2021-01-01 PROCEDURE — 94664 DEMO&/EVAL PT USE INHALER: CPT

## 2021-01-01 PROCEDURE — 81015 MICROSCOPIC EXAM OF URINE: CPT

## 2021-01-01 PROCEDURE — 80053 COMPREHEN METABOLIC PANEL: CPT

## 2021-01-01 PROCEDURE — 80048 BASIC METABOLIC PNL TOTAL CA: CPT

## 2021-01-01 PROCEDURE — 74011250637 HC RX REV CODE- 250/637: Performed by: INTERNAL MEDICINE

## 2021-01-01 PROCEDURE — 74011000250 HC RX REV CODE- 250: Performed by: RADIOLOGY

## 2021-01-01 PROCEDURE — 87635 SARS-COV-2 COVID-19 AMP PRB: CPT

## 2021-01-01 PROCEDURE — 2709999900 HC NON-CHARGEABLE SUPPLY

## 2021-01-01 PROCEDURE — 74011250637 HC RX REV CODE- 250/637: Performed by: NURSE PRACTITIONER

## 2021-01-01 PROCEDURE — 74011000258 HC RX REV CODE- 258: Performed by: EMERGENCY MEDICINE

## 2021-01-01 PROCEDURE — 99291 CRITICAL CARE FIRST HOUR: CPT | Performed by: INTERNAL MEDICINE

## 2021-01-01 PROCEDURE — 74011000250 HC RX REV CODE- 250: Performed by: INTERNAL MEDICINE

## 2021-01-01 PROCEDURE — 90935 HEMODIALYSIS ONE EVALUATION: CPT

## 2021-01-01 PROCEDURE — 84100 ASSAY OF PHOSPHORUS: CPT

## 2021-01-01 PROCEDURE — 83550 IRON BINDING TEST: CPT

## 2021-01-01 PROCEDURE — 94762 N-INVAS EAR/PLS OXIMTRY CONT: CPT

## 2021-01-01 PROCEDURE — 84145 PROCALCITONIN (PCT): CPT

## 2021-01-01 PROCEDURE — 86580 TB INTRADERMAL TEST: CPT | Performed by: NURSE PRACTITIONER

## 2021-01-01 PROCEDURE — 94002 VENT MGMT INPAT INIT DAY: CPT

## 2021-01-01 PROCEDURE — 83735 ASSAY OF MAGNESIUM: CPT

## 2021-01-01 PROCEDURE — 83880 ASSAY OF NATRIURETIC PEPTIDE: CPT

## 2021-01-01 PROCEDURE — 74011636637 HC RX REV CODE- 636/637: Performed by: NURSE PRACTITIONER

## 2021-01-01 PROCEDURE — 93005 ELECTROCARDIOGRAM TRACING: CPT | Performed by: EMERGENCY MEDICINE

## 2021-01-01 PROCEDURE — 82746 ASSAY OF FOLIC ACID SERUM: CPT

## 2021-01-01 PROCEDURE — 36415 COLL VENOUS BLD VENIPUNCTURE: CPT

## 2021-01-01 PROCEDURE — 97161 PT EVAL LOW COMPLEX 20 MIN: CPT

## 2021-01-01 PROCEDURE — 74176 CT ABD & PELVIS W/O CONTRAST: CPT

## 2021-01-01 PROCEDURE — 83605 ASSAY OF LACTIC ACID: CPT

## 2021-01-01 PROCEDURE — 97530 THERAPEUTIC ACTIVITIES: CPT

## 2021-01-01 PROCEDURE — 74011250636 HC RX REV CODE- 250/636: Performed by: RADIOLOGY

## 2021-01-01 PROCEDURE — 96374 THER/PROPH/DIAG INJ IV PUSH: CPT

## 2021-01-01 PROCEDURE — 0BH17EZ INSERTION OF ENDOTRACHEAL AIRWAY INTO TRACHEA, VIA NATURAL OR ARTIFICIAL OPENING: ICD-10-PCS | Performed by: NURSE PRACTITIONER

## 2021-01-01 PROCEDURE — 85025 COMPLETE CBC W/AUTO DIFF WBC: CPT

## 2021-01-01 PROCEDURE — 74018 RADEX ABDOMEN 1 VIEW: CPT

## 2021-01-01 PROCEDURE — 74011000258 HC RX REV CODE- 258: Performed by: HOSPITALIST

## 2021-01-01 PROCEDURE — 86901 BLOOD TYPING SEROLOGIC RH(D): CPT

## 2021-01-01 PROCEDURE — 65660000000 HC RM CCU STEPDOWN

## 2021-01-01 PROCEDURE — 71045 X-RAY EXAM CHEST 1 VIEW: CPT

## 2021-01-01 PROCEDURE — 94644 CONT INHLJ TX 1ST HOUR: CPT

## 2021-01-01 PROCEDURE — 74011000258 HC RX REV CODE- 258: Performed by: INTERNAL MEDICINE

## 2021-01-01 PROCEDURE — 74011250636 HC RX REV CODE- 250/636

## 2021-01-01 PROCEDURE — 74011000250 HC RX REV CODE- 250: Performed by: FAMILY MEDICINE

## 2021-01-01 PROCEDURE — 74011636637 HC RX REV CODE- 636/637: Performed by: STUDENT IN AN ORGANIZED HEALTH CARE EDUCATION/TRAINING PROGRAM

## 2021-01-01 PROCEDURE — 36600 WITHDRAWAL OF ARTERIAL BLOOD: CPT

## 2021-01-01 PROCEDURE — 94760 N-INVAS EAR/PLS OXIMETRY 1: CPT

## 2021-01-01 PROCEDURE — 97165 OT EVAL LOW COMPLEX 30 MIN: CPT

## 2021-01-01 PROCEDURE — 36556 INSERT NON-TUNNEL CV CATH: CPT | Performed by: NURSE PRACTITIONER

## 2021-01-01 PROCEDURE — 86706 HEP B SURFACE ANTIBODY: CPT

## 2021-01-01 PROCEDURE — 74011636637 HC RX REV CODE- 636/637: Performed by: EMERGENCY MEDICINE

## 2021-01-01 PROCEDURE — P9016 RBC LEUKOCYTES REDUCED: HCPCS

## 2021-01-01 PROCEDURE — 74011636637 HC RX REV CODE- 636/637: Performed by: HOSPITALIST

## 2021-01-01 PROCEDURE — 86140 C-REACTIVE PROTEIN: CPT

## 2021-01-01 PROCEDURE — 74011250636 HC RX REV CODE- 250/636: Performed by: NURSE PRACTITIONER

## 2021-01-01 PROCEDURE — 96375 TX/PRO/DX INJ NEW DRUG ADDON: CPT

## 2021-01-01 PROCEDURE — 36592 COLLECT BLOOD FROM PICC: CPT

## 2021-01-01 PROCEDURE — 76937 US GUIDE VASCULAR ACCESS: CPT

## 2021-01-01 PROCEDURE — 81001 URINALYSIS AUTO W/SCOPE: CPT

## 2021-01-01 PROCEDURE — 87077 CULTURE AEROBIC IDENTIFY: CPT

## 2021-01-01 PROCEDURE — 74011250636 HC RX REV CODE- 250/636: Performed by: STUDENT IN AN ORGANIZED HEALTH CARE EDUCATION/TRAINING PROGRAM

## 2021-01-01 PROCEDURE — 86923 COMPATIBILITY TEST ELECTRIC: CPT

## 2021-01-01 PROCEDURE — 5A1D70Z PERFORMANCE OF URINARY FILTRATION, INTERMITTENT, LESS THAN 6 HOURS PER DAY: ICD-10-PCS | Performed by: INTERNAL MEDICINE

## 2021-01-01 PROCEDURE — 82803 BLOOD GASES ANY COMBINATION: CPT

## 2021-01-01 PROCEDURE — C1750 CATH, HEMODIALYSIS,LONG-TERM: HCPCS

## 2021-01-01 PROCEDURE — 84132 ASSAY OF SERUM POTASSIUM: CPT

## 2021-01-01 PROCEDURE — 87086 URINE CULTURE/COLONY COUNT: CPT

## 2021-01-01 PROCEDURE — 82550 ASSAY OF CK (CPK): CPT

## 2021-01-01 PROCEDURE — 85027 COMPLETE CBC AUTOMATED: CPT

## 2021-01-01 PROCEDURE — 74011000250 HC RX REV CODE- 250: Performed by: EMERGENCY MEDICINE

## 2021-01-01 PROCEDURE — 65620000000 HC RM CCU GENERAL

## 2021-01-01 PROCEDURE — 94640 AIRWAY INHALATION TREATMENT: CPT

## 2021-01-01 PROCEDURE — 74011250636 HC RX REV CODE- 250/636: Performed by: EMERGENCY MEDICINE

## 2021-01-01 PROCEDURE — 30233N1 TRANSFUSION OF NONAUTOLOGOUS RED BLOOD CELLS INTO PERIPHERAL VEIN, PERCUTANEOUS APPROACH: ICD-10-PCS | Performed by: FAMILY MEDICINE

## 2021-01-01 PROCEDURE — 76450000000

## 2021-01-01 PROCEDURE — 74011250636 HC RX REV CODE- 250/636: Performed by: HOSPITALIST

## 2021-01-01 PROCEDURE — 94660 CPAP INITIATION&MGMT: CPT

## 2021-01-01 PROCEDURE — 05HM33Z INSERTION OF INFUSION DEVICE INTO RIGHT INTERNAL JUGULAR VEIN, PERCUTANEOUS APPROACH: ICD-10-PCS | Performed by: NURSE PRACTITIONER

## 2021-01-01 PROCEDURE — 99285 EMERGENCY DEPT VISIT HI MDM: CPT

## 2021-01-01 PROCEDURE — 83690 ASSAY OF LIPASE: CPT

## 2021-01-01 PROCEDURE — 82728 ASSAY OF FERRITIN: CPT

## 2021-01-01 PROCEDURE — 87150 DNA/RNA AMPLIFIED PROBE: CPT

## 2021-01-01 PROCEDURE — 5A1D70Z PERFORMANCE OF URINARY FILTRATION, INTERMITTENT, LESS THAN 6 HOURS PER DAY: ICD-10-PCS | Performed by: FAMILY MEDICINE

## 2021-01-01 PROCEDURE — 74011000250 HC RX REV CODE- 250

## 2021-01-01 PROCEDURE — 85014 HEMATOCRIT: CPT

## 2021-01-01 PROCEDURE — 97535 SELF CARE MNGMENT TRAINING: CPT

## 2021-01-01 PROCEDURE — 87040 BLOOD CULTURE FOR BACTERIA: CPT

## 2021-01-01 PROCEDURE — 74011000250 HC RX REV CODE- 250: Performed by: STUDENT IN AN ORGANIZED HEALTH CARE EDUCATION/TRAINING PROGRAM

## 2021-01-01 PROCEDURE — 74011000250 HC RX REV CODE- 250: Performed by: HOSPITALIST

## 2021-01-01 PROCEDURE — 65270000029 HC RM PRIVATE

## 2021-01-01 PROCEDURE — 87205 SMEAR GRAM STAIN: CPT

## 2021-01-01 PROCEDURE — 83970 ASSAY OF PARATHORMONE: CPT

## 2021-01-01 PROCEDURE — 0BP1XDZ REMOVAL OF INTRALUMINAL DEVICE FROM TRACHEA, EXTERNAL APPROACH: ICD-10-PCS | Performed by: NURSE PRACTITIONER

## 2021-01-01 PROCEDURE — 99497 ADVNCD CARE PLAN 30 MIN: CPT | Performed by: INTERNAL MEDICINE

## 2021-01-01 PROCEDURE — 36620 INSERTION CATHETER ARTERY: CPT

## 2021-01-01 PROCEDURE — 74011250637 HC RX REV CODE- 250/637: Performed by: STUDENT IN AN ORGANIZED HEALTH CARE EDUCATION/TRAINING PROGRAM

## 2021-01-01 PROCEDURE — 96376 TX/PRO/DX INJ SAME DRUG ADON: CPT

## 2021-01-01 PROCEDURE — 74011000258 HC RX REV CODE- 258: Performed by: STUDENT IN AN ORGANIZED HEALTH CARE EDUCATION/TRAINING PROGRAM

## 2021-01-01 PROCEDURE — G0257 UNSCHED DIALYSIS ESRD PT HOS: HCPCS

## 2021-01-01 PROCEDURE — 87522 HEPATITIS C REVRS TRNSCRPJ: CPT

## 2021-01-01 PROCEDURE — P9040 RBC LEUKOREDUCED IRRADIATED: HCPCS

## 2021-01-01 PROCEDURE — 77030031139 HC SUT VCRL2 J&J -A

## 2021-01-01 PROCEDURE — 99238 HOSP IP/OBS DSCHRG MGMT 30/<: CPT | Performed by: INTERNAL MEDICINE

## 2021-01-01 PROCEDURE — 36620 INSERTION CATHETER ARTERY: CPT | Performed by: NURSE PRACTITIONER

## 2021-01-01 PROCEDURE — 31500 INSERT EMERGENCY AIRWAY: CPT | Performed by: NURSE PRACTITIONER

## 2021-01-01 PROCEDURE — 87340 HEPATITIS B SURFACE AG IA: CPT

## 2021-01-01 PROCEDURE — 83540 ASSAY OF IRON: CPT

## 2021-01-01 PROCEDURE — 83520 IMMUNOASSAY QUANT NOS NONAB: CPT

## 2021-01-01 PROCEDURE — 82607 VITAMIN B-12: CPT

## 2021-01-01 PROCEDURE — 74011636637 HC RX REV CODE- 636/637: Performed by: FAMILY MEDICINE

## 2021-01-01 PROCEDURE — 93971 EXTREMITY STUDY: CPT

## 2021-01-01 PROCEDURE — 96365 THER/PROPH/DIAG IV INF INIT: CPT

## 2021-01-01 PROCEDURE — 74011000258 HC RX REV CODE- 258

## 2021-01-01 PROCEDURE — 74011000250 HC RX REV CODE- 250: Performed by: NURSE PRACTITIONER

## 2021-01-01 PROCEDURE — 96361 HYDRATE IV INFUSION ADD-ON: CPT

## 2021-01-01 PROCEDURE — 99223 1ST HOSP IP/OBS HIGH 75: CPT | Performed by: INTERNAL MEDICINE

## 2021-01-01 PROCEDURE — 36430 TRANSFUSION BLD/BLD COMPNT: CPT

## 2021-01-01 PROCEDURE — 74011250636 HC RX REV CODE- 250/636: Performed by: FAMILY MEDICINE

## 2021-01-01 PROCEDURE — C1894 INTRO/SHEATH, NON-LASER: HCPCS

## 2021-01-01 PROCEDURE — 77030002916 HC SUT ETHLN J&J -A

## 2021-01-01 PROCEDURE — 36556 INSERT NON-TUNNEL CV CATH: CPT

## 2021-01-01 PROCEDURE — 84295 ASSAY OF SERUM SODIUM: CPT

## 2021-01-01 PROCEDURE — 5A1D70Z PERFORMANCE OF URINARY FILTRATION, INTERMITTENT, LESS THAN 6 HOURS PER DAY: ICD-10-PCS | Performed by: NURSE PRACTITIONER

## 2021-01-01 PROCEDURE — 74011000302 HC RX REV CODE- 302: Performed by: NURSE PRACTITIONER

## 2021-01-01 PROCEDURE — 87186 SC STD MICRODIL/AGAR DIL: CPT

## 2021-01-01 PROCEDURE — 74011636637 HC RX REV CODE- 636/637: Performed by: INTERNAL MEDICINE

## 2021-01-01 PROCEDURE — 70450 CT HEAD/BRAIN W/O DYE: CPT

## 2021-01-01 PROCEDURE — 82306 VITAMIN D 25 HYDROXY: CPT

## 2021-01-01 PROCEDURE — 94645 CONT INHLJ TX EACH ADDL HOUR: CPT

## 2021-01-01 PROCEDURE — 86705 HEP B CORE ANTIBODY IGM: CPT

## 2021-01-01 PROCEDURE — 03HB33Z INSERTION OF INFUSION DEVICE INTO RIGHT RADIAL ARTERY, PERCUTANEOUS APPROACH: ICD-10-PCS | Performed by: NURSE PRACTITIONER

## 2021-01-01 PROCEDURE — 83036 HEMOGLOBIN GLYCOSYLATED A1C: CPT

## 2021-01-01 RX ORDER — FENTANYL CITRATE 50 UG/ML
INJECTION, SOLUTION INTRAMUSCULAR; INTRAVENOUS
Status: COMPLETED
Start: 2021-01-01 | End: 2021-01-01

## 2021-01-01 RX ORDER — MIDAZOLAM HYDROCHLORIDE 1 MG/ML
INJECTION, SOLUTION INTRAMUSCULAR; INTRAVENOUS
Status: ACTIVE
Start: 2021-01-01 | End: 2021-01-01

## 2021-01-01 RX ORDER — SODIUM CHLORIDE 0.9 % (FLUSH) 0.9 %
5-10 SYRINGE (ML) INJECTION AS NEEDED
Status: DISCONTINUED | OUTPATIENT
Start: 2021-01-01 | End: 2021-01-01

## 2021-01-01 RX ORDER — NOREPINEPHRINE BITARTRATE/D5W 4MG/250ML
.5-3 PLASTIC BAG, INJECTION (ML) INTRAVENOUS
Status: DISCONTINUED | OUTPATIENT
Start: 2021-01-01 | End: 2021-01-01

## 2021-01-01 RX ORDER — LISINOPRIL 20 MG/1
20 TABLET ORAL 2 TIMES DAILY
Qty: 30 TAB | Refills: 0 | Status: SHIPPED | OUTPATIENT
Start: 2021-01-01 | End: 2021-01-01

## 2021-01-01 RX ORDER — ONDANSETRON 4 MG/1
4 TABLET, ORALLY DISINTEGRATING ORAL
Status: DISCONTINUED | OUTPATIENT
Start: 2021-01-01 | End: 2021-01-01 | Stop reason: HOSPADM

## 2021-01-01 RX ORDER — ACETAMINOPHEN 325 MG/1
650 TABLET ORAL
Status: DISCONTINUED | OUTPATIENT
Start: 2021-01-01 | End: 2021-01-01 | Stop reason: HOSPADM

## 2021-01-01 RX ORDER — LABETALOL HYDROCHLORIDE 5 MG/ML
20 INJECTION, SOLUTION INTRAVENOUS ONCE
Status: COMPLETED | OUTPATIENT
Start: 2021-01-01 | End: 2021-01-01

## 2021-01-01 RX ORDER — ZINC SULFATE 50(220)MG
1 CAPSULE ORAL DAILY
Status: DISCONTINUED | OUTPATIENT
Start: 2021-01-01 | End: 2021-01-01 | Stop reason: HOSPADM

## 2021-01-01 RX ORDER — CODEINE PHOSPHATE AND GUAIFENESIN 10; 100 MG/5ML; MG/5ML
10 SOLUTION ORAL
Status: DISCONTINUED | OUTPATIENT
Start: 2021-01-01 | End: 2021-01-01 | Stop reason: HOSPADM

## 2021-01-01 RX ORDER — SODIUM BICARBONATE 1 MEQ/ML
50 SYRINGE (ML) INTRAVENOUS ONCE
Status: COMPLETED | OUTPATIENT
Start: 2021-01-01 | End: 2021-01-01

## 2021-01-01 RX ORDER — HYDRALAZINE HYDROCHLORIDE 10 MG/1
10 TABLET, FILM COATED ORAL 3 TIMES DAILY
Status: DISCONTINUED | OUTPATIENT
Start: 2021-01-01 | End: 2021-01-01

## 2021-01-01 RX ORDER — HYDRALAZINE HYDROCHLORIDE 20 MG/ML
10 INJECTION INTRAMUSCULAR; INTRAVENOUS
Status: DISCONTINUED | OUTPATIENT
Start: 2021-01-01 | End: 2021-01-01 | Stop reason: HOSPADM

## 2021-01-01 RX ORDER — MIDAZOLAM HYDROCHLORIDE 1 MG/ML
.25-2 INJECTION, SOLUTION INTRAMUSCULAR; INTRAVENOUS
Status: DISCONTINUED | OUTPATIENT
Start: 2021-01-01 | End: 2021-01-01

## 2021-01-01 RX ORDER — ASCORBIC ACID 500 MG
2000 TABLET ORAL DAILY
Status: DISCONTINUED | OUTPATIENT
Start: 2021-01-01 | End: 2021-01-01 | Stop reason: HOSPADM

## 2021-01-01 RX ORDER — DEXTROSE 50 % IN WATER (D50W) INTRAVENOUS SYRINGE
25 ONCE
Status: COMPLETED | OUTPATIENT
Start: 2021-01-01 | End: 2021-01-01

## 2021-01-01 RX ORDER — PROMETHAZINE HYDROCHLORIDE 25 MG/1
12.5 TABLET ORAL
Status: DISCONTINUED | OUTPATIENT
Start: 2021-01-01 | End: 2021-01-01 | Stop reason: HOSPADM

## 2021-01-01 RX ORDER — NIFEDIPINE 30 MG/1
60 TABLET, EXTENDED RELEASE ORAL DAILY
Status: DISCONTINUED | OUTPATIENT
Start: 2021-01-01 | End: 2021-01-01

## 2021-01-01 RX ORDER — SODIUM CHLORIDE 0.9 % (FLUSH) 0.9 %
5-40 SYRINGE (ML) INJECTION AS NEEDED
Status: DISCONTINUED | OUTPATIENT
Start: 2021-01-01 | End: 2021-01-01 | Stop reason: HOSPADM

## 2021-01-01 RX ORDER — ACETYLCYSTEINE 200 MG/ML
600 SOLUTION ORAL; RESPIRATORY (INHALATION) 2 TIMES DAILY
Status: DISCONTINUED | OUTPATIENT
Start: 2021-01-01 | End: 2021-01-01 | Stop reason: HOSPADM

## 2021-01-01 RX ORDER — HEPARIN SODIUM 5000 [USP'U]/ML
5000 INJECTION, SOLUTION INTRAVENOUS; SUBCUTANEOUS EVERY 8 HOURS
Status: DISCONTINUED | OUTPATIENT
Start: 2021-01-01 | End: 2021-01-01 | Stop reason: HOSPADM

## 2021-01-01 RX ORDER — IPRATROPIUM BROMIDE AND ALBUTEROL SULFATE 2.5; .5 MG/3ML; MG/3ML
3 SOLUTION RESPIRATORY (INHALATION)
Status: DISCONTINUED | OUTPATIENT
Start: 2021-01-01 | End: 2021-01-01

## 2021-01-01 RX ORDER — FAMOTIDINE 20 MG/1
20 TABLET, FILM COATED ORAL DAILY
Status: DISCONTINUED | OUTPATIENT
Start: 2021-01-01 | End: 2021-01-01 | Stop reason: HOSPADM

## 2021-01-01 RX ORDER — ONDANSETRON 2 MG/ML
4 INJECTION INTRAMUSCULAR; INTRAVENOUS
Status: DISCONTINUED | OUTPATIENT
Start: 2021-01-01 | End: 2021-01-01 | Stop reason: HOSPADM

## 2021-01-01 RX ORDER — SODIUM BICARBONATE 650 MG/1
650 TABLET ORAL 3 TIMES DAILY
Status: DISCONTINUED | OUTPATIENT
Start: 2021-01-01 | End: 2021-01-01

## 2021-01-01 RX ORDER — LISINOPRIL 20 MG/1
20 TABLET ORAL 2 TIMES DAILY
Status: DISCONTINUED | OUTPATIENT
Start: 2021-01-01 | End: 2021-01-01 | Stop reason: HOSPADM

## 2021-01-01 RX ORDER — SODIUM CHLORIDE 0.9 % (FLUSH) 0.9 %
5-10 SYRINGE (ML) INJECTION AS NEEDED
Status: DISCONTINUED | OUTPATIENT
Start: 2021-01-01 | End: 2021-01-01 | Stop reason: HOSPADM

## 2021-01-01 RX ORDER — HEPARIN SODIUM 5000 [USP'U]/ML
5000 INJECTION, SOLUTION INTRAVENOUS; SUBCUTANEOUS EVERY 12 HOURS
Status: DISCONTINUED | OUTPATIENT
Start: 2021-01-01 | End: 2021-01-01

## 2021-01-01 RX ORDER — GLIPIZIDE 5 MG/1
2.5 TABLET ORAL
Qty: 30 TAB | Refills: 0 | Status: SHIPPED | OUTPATIENT
Start: 2021-01-01 | End: 2021-01-01

## 2021-01-01 RX ORDER — NIFEDIPINE 30 MG/1
90 TABLET, EXTENDED RELEASE ORAL DAILY
Status: DISCONTINUED | OUTPATIENT
Start: 2021-01-01 | End: 2021-01-01 | Stop reason: HOSPADM

## 2021-01-01 RX ORDER — ACETAMINOPHEN 650 MG/1
650 SUPPOSITORY RECTAL
Status: DISCONTINUED | OUTPATIENT
Start: 2021-01-01 | End: 2021-01-01

## 2021-01-01 RX ORDER — SODIUM CHLORIDE 0.9 % (FLUSH) 0.9 %
5-10 SYRINGE (ML) INJECTION EVERY 8 HOURS
Status: DISCONTINUED | OUTPATIENT
Start: 2021-01-01 | End: 2021-01-01 | Stop reason: HOSPADM

## 2021-01-01 RX ORDER — HEPARIN SODIUM 5000 [USP'U]/ML
5000 INJECTION, SOLUTION INTRAVENOUS; SUBCUTANEOUS EVERY 8 HOURS
Status: DISCONTINUED | OUTPATIENT
Start: 2021-01-01 | End: 2021-01-01

## 2021-01-01 RX ORDER — ONDANSETRON 2 MG/ML
4 INJECTION INTRAMUSCULAR; INTRAVENOUS
Status: COMPLETED | OUTPATIENT
Start: 2021-01-01 | End: 2021-01-01

## 2021-01-01 RX ORDER — SODIUM CHLORIDE 9 MG/ML
0-8 INJECTION, SOLUTION INTRAVENOUS
Status: DISCONTINUED | OUTPATIENT
Start: 2021-01-01 | End: 2021-01-01

## 2021-01-01 RX ORDER — FAMOTIDINE 20 MG/1
20 TABLET, FILM COATED ORAL DAILY
Qty: 30 TAB | Refills: 0 | Status: SHIPPED | OUTPATIENT
Start: 2021-01-01 | End: 2021-01-01

## 2021-01-01 RX ORDER — CARVEDILOL 25 MG/1
25 TABLET ORAL 2 TIMES DAILY WITH MEALS
Status: DISCONTINUED | OUTPATIENT
Start: 2021-01-01 | End: 2021-01-01 | Stop reason: HOSPADM

## 2021-01-01 RX ORDER — SODIUM BICARBONATE 1 MEQ/ML
SYRINGE (ML) INTRAVENOUS
Status: COMPLETED
Start: 2021-01-01 | End: 2021-01-01

## 2021-01-01 RX ORDER — INSULIN LISPRO 100 [IU]/ML
0-10 INJECTION, SOLUTION INTRAVENOUS; SUBCUTANEOUS
Status: DISCONTINUED | OUTPATIENT
Start: 2021-01-01 | End: 2021-01-01 | Stop reason: HOSPADM

## 2021-01-01 RX ORDER — ETOMIDATE 2 MG/ML
INJECTION INTRAVENOUS
Status: COMPLETED
Start: 2021-01-01 | End: 2021-01-01

## 2021-01-01 RX ORDER — IBUPROFEN 200 MG
1 TABLET ORAL EVERY 24 HOURS
Qty: 30 PATCH | Refills: 0 | Status: SHIPPED | OUTPATIENT
Start: 2021-01-01 | End: 2021-01-01

## 2021-01-01 RX ORDER — FENTANYL CITRATE 50 UG/ML
INJECTION, SOLUTION INTRAMUSCULAR; INTRAVENOUS
Status: ACTIVE
Start: 2021-01-01 | End: 2021-01-01

## 2021-01-01 RX ORDER — VANCOMYCIN/0.9 % SOD CHLORIDE 1.5G/250ML
1500 PLASTIC BAG, INJECTION (ML) INTRAVENOUS ONCE
Status: DISCONTINUED | OUTPATIENT
Start: 2021-01-01 | End: 2021-01-01 | Stop reason: SDUPTHER

## 2021-01-01 RX ORDER — SODIUM BICARBONATE 1 MEQ/ML
100 SYRINGE (ML) INTRAVENOUS ONCE
Status: COMPLETED | OUTPATIENT
Start: 2021-01-01 | End: 2021-01-01

## 2021-01-01 RX ORDER — LISINOPRIL 20 MG/1
20 TABLET ORAL 2 TIMES DAILY
Status: DISCONTINUED | OUTPATIENT
Start: 2021-01-01 | End: 2021-01-01

## 2021-01-01 RX ORDER — ATORVASTATIN CALCIUM 40 MG/1
40 TABLET, FILM COATED ORAL DAILY
Status: DISCONTINUED | OUTPATIENT
Start: 2021-01-01 | End: 2021-01-01 | Stop reason: HOSPADM

## 2021-01-01 RX ORDER — LABETALOL HYDROCHLORIDE 5 MG/ML
20 INJECTION, SOLUTION INTRAVENOUS
Status: COMPLETED | OUTPATIENT
Start: 2021-01-01 | End: 2021-01-01

## 2021-01-01 RX ORDER — FUROSEMIDE 10 MG/ML
60 INJECTION INTRAMUSCULAR; INTRAVENOUS 2 TIMES DAILY
Status: DISCONTINUED | OUTPATIENT
Start: 2021-01-01 | End: 2021-01-01 | Stop reason: HOSPADM

## 2021-01-01 RX ORDER — GLIPIZIDE 5 MG/1
2.5 TABLET ORAL
Status: DISCONTINUED | OUTPATIENT
Start: 2021-01-01 | End: 2021-01-01 | Stop reason: HOSPADM

## 2021-01-01 RX ORDER — DEXTROSE 50 % IN WATER (D50W) INTRAVENOUS SYRINGE
25-50 AS NEEDED
Status: DISCONTINUED | OUTPATIENT
Start: 2021-01-01 | End: 2021-01-01 | Stop reason: HOSPADM

## 2021-01-01 RX ORDER — SUCCINYLCHOLINE CHLORIDE 20 MG/ML INJECTION SOLUTION
100 SOLUTION
Status: COMPLETED | OUTPATIENT
Start: 2021-01-01 | End: 2021-01-01

## 2021-01-01 RX ORDER — DEXTROSE 40 %
15 GEL (GRAM) ORAL AS NEEDED
Status: DISCONTINUED | OUTPATIENT
Start: 2021-01-01 | End: 2021-01-01 | Stop reason: HOSPADM

## 2021-01-01 RX ORDER — INSULIN LISPRO 100 [IU]/ML
INJECTION, SOLUTION INTRAVENOUS; SUBCUTANEOUS
Status: DISCONTINUED | OUTPATIENT
Start: 2021-01-01 | End: 2021-01-01

## 2021-01-01 RX ORDER — SODIUM CHLORIDE 0.9 % (FLUSH) 0.9 %
5-40 SYRINGE (ML) INJECTION EVERY 8 HOURS
Status: DISCONTINUED | OUTPATIENT
Start: 2021-01-01 | End: 2021-01-01

## 2021-01-01 RX ORDER — INSULIN LISPRO 100 [IU]/ML
INJECTION, SOLUTION INTRAVENOUS; SUBCUTANEOUS
Status: DISCONTINUED | OUTPATIENT
Start: 2021-01-01 | End: 2021-01-01 | Stop reason: HOSPADM

## 2021-01-01 RX ORDER — ACETAMINOPHEN 650 MG/1
650 SUPPOSITORY RECTAL
Status: DISCONTINUED | OUTPATIENT
Start: 2021-01-01 | End: 2021-01-01 | Stop reason: HOSPADM

## 2021-01-01 RX ORDER — HEPARIN SODIUM 1000 [USP'U]/ML
2000 INJECTION, SOLUTION INTRAVENOUS; SUBCUTANEOUS
Status: DISCONTINUED | OUTPATIENT
Start: 2021-01-01 | End: 2021-01-01

## 2021-01-01 RX ORDER — MIDAZOLAM HYDROCHLORIDE 1 MG/ML
1 INJECTION, SOLUTION INTRAMUSCULAR; INTRAVENOUS ONCE
Status: COMPLETED | OUTPATIENT
Start: 2021-01-01 | End: 2021-01-01

## 2021-01-01 RX ORDER — SODIUM CHLORIDE 9 MG/ML
25 INJECTION, SOLUTION INTRAVENOUS CONTINUOUS
Status: DISCONTINUED | OUTPATIENT
Start: 2021-01-01 | End: 2021-01-01

## 2021-01-01 RX ORDER — HYDRALAZINE HYDROCHLORIDE 20 MG/ML
10 INJECTION INTRAMUSCULAR; INTRAVENOUS ONCE
Status: COMPLETED | OUTPATIENT
Start: 2021-01-01 | End: 2021-01-01

## 2021-01-01 RX ORDER — MORPHINE SULFATE 2 MG/ML
INJECTION, SOLUTION INTRAMUSCULAR; INTRAVENOUS
Status: COMPLETED
Start: 2021-01-01 | End: 2021-01-01

## 2021-01-01 RX ORDER — SODIUM CHLORIDE 0.9 % (FLUSH) 0.9 %
5-40 SYRINGE (ML) INJECTION EVERY 8 HOURS
Status: DISCONTINUED | OUTPATIENT
Start: 2021-01-01 | End: 2021-01-01 | Stop reason: HOSPADM

## 2021-01-01 RX ORDER — DEXAMETHASONE SODIUM PHOSPHATE 100 MG/10ML
6 INJECTION INTRAMUSCULAR; INTRAVENOUS EVERY 24 HOURS
Status: DISCONTINUED | OUTPATIENT
Start: 2021-01-01 | End: 2021-01-01 | Stop reason: HOSPADM

## 2021-01-01 RX ORDER — DIPHENHYDRAMINE HCL 25 MG
25 CAPSULE ORAL
Status: DISCONTINUED | OUTPATIENT
Start: 2021-01-01 | End: 2021-01-01 | Stop reason: HOSPADM

## 2021-01-01 RX ORDER — TEMAZEPAM 15 MG/1
15 CAPSULE ORAL
Status: DISCONTINUED | OUTPATIENT
Start: 2021-01-01 | End: 2021-01-01 | Stop reason: HOSPADM

## 2021-01-01 RX ORDER — VANCOMYCIN 1.75 GRAM/500 ML IN 0.9 % SODIUM CHLORIDE INTRAVENOUS
1750 ONCE
Status: COMPLETED | OUTPATIENT
Start: 2021-01-01 | End: 2021-01-01

## 2021-01-01 RX ORDER — MORPHINE SULFATE 4 MG/ML
4 INJECTION INTRAVENOUS
Status: COMPLETED | OUTPATIENT
Start: 2021-01-01 | End: 2021-01-01

## 2021-01-01 RX ORDER — POLYETHYLENE GLYCOL 3350 17 G/17G
17 POWDER, FOR SOLUTION ORAL DAILY PRN
Status: DISCONTINUED | OUTPATIENT
Start: 2021-01-01 | End: 2021-01-01

## 2021-01-01 RX ORDER — POLYETHYLENE GLYCOL 3350 17 G/17G
17 POWDER, FOR SOLUTION ORAL DAILY PRN
Status: DISCONTINUED | OUTPATIENT
Start: 2021-01-01 | End: 2021-01-01 | Stop reason: HOSPADM

## 2021-01-01 RX ORDER — HYDRALAZINE HYDROCHLORIDE 20 MG/ML
10 INJECTION INTRAMUSCULAR; INTRAVENOUS
Status: DISCONTINUED | OUTPATIENT
Start: 2021-01-01 | End: 2021-01-01

## 2021-01-01 RX ORDER — LABETALOL HYDROCHLORIDE 5 MG/ML
20 INJECTION, SOLUTION INTRAVENOUS
Status: DISCONTINUED | OUTPATIENT
Start: 2021-01-01 | End: 2021-01-01 | Stop reason: HOSPADM

## 2021-01-01 RX ORDER — HEPARIN SODIUM 1000 [USP'U]/ML
1000-10000 INJECTION, SOLUTION INTRAVENOUS; SUBCUTANEOUS ONCE
Status: COMPLETED | OUTPATIENT
Start: 2021-01-01 | End: 2021-01-01

## 2021-01-01 RX ORDER — MELATONIN
4000 DAILY
Status: DISCONTINUED | OUTPATIENT
Start: 2021-01-01 | End: 2021-01-01

## 2021-01-01 RX ORDER — HYDROCODONE BITARTRATE AND ACETAMINOPHEN 5; 325 MG/1; MG/1
1 TABLET ORAL
Status: DISCONTINUED | OUTPATIENT
Start: 2021-01-01 | End: 2021-01-01 | Stop reason: HOSPADM

## 2021-01-01 RX ORDER — FENTANYL CITRATE-0.9 % NACL/PF 25 MCG/ML
0-200 PLASTIC BAG, INJECTION (ML) INJECTION
Status: DISCONTINUED | OUTPATIENT
Start: 2021-01-01 | End: 2021-01-01

## 2021-01-01 RX ORDER — FAMOTIDINE 20 MG/1
20 TABLET, FILM COATED ORAL DAILY
Status: DISCONTINUED | OUTPATIENT
Start: 2021-01-01 | End: 2021-01-01

## 2021-01-01 RX ORDER — ASCORBIC ACID 500 MG
2000 TABLET ORAL DAILY
Status: DISCONTINUED | OUTPATIENT
Start: 2021-01-01 | End: 2021-01-01

## 2021-01-01 RX ORDER — NIFEDIPINE 90 MG/1
90 TABLET, EXTENDED RELEASE ORAL DAILY
Qty: 30 TAB | Refills: 0 | Status: SHIPPED | OUTPATIENT
Start: 2021-01-01 | End: 2021-01-01

## 2021-01-01 RX ORDER — ACETAMINOPHEN 325 MG/1
650 TABLET ORAL
Status: DISCONTINUED | OUTPATIENT
Start: 2021-01-01 | End: 2021-01-01

## 2021-01-01 RX ORDER — SODIUM BICARBONATE 650 MG/1
650 TABLET ORAL 3 TIMES DAILY
Status: DISCONTINUED | OUTPATIENT
Start: 2021-01-01 | End: 2021-01-01 | Stop reason: HOSPADM

## 2021-01-01 RX ORDER — MORPHINE SULFATE 2 MG/ML
2 INJECTION, SOLUTION INTRAMUSCULAR; INTRAVENOUS
Status: DISCONTINUED | OUTPATIENT
Start: 2021-01-01 | End: 2021-01-01

## 2021-01-01 RX ORDER — CARVEDILOL 25 MG/1
25 TABLET ORAL 2 TIMES DAILY WITH MEALS
Status: DISCONTINUED | OUTPATIENT
Start: 2021-01-01 | End: 2021-01-01

## 2021-01-01 RX ORDER — CARVEDILOL 25 MG/1
25 TABLET ORAL 2 TIMES DAILY WITH MEALS
COMMUNITY
End: 2021-01-01

## 2021-01-01 RX ORDER — SUCCINYLCHOLINE CHLORIDE 20 MG/ML INJECTION SOLUTION
SOLUTION
Status: COMPLETED
Start: 2021-01-01 | End: 2021-01-01

## 2021-01-01 RX ORDER — NIFEDIPINE 90 MG/1
90 TABLET, EXTENDED RELEASE ORAL DAILY
Status: DISCONTINUED | OUTPATIENT
Start: 2021-01-01 | End: 2021-01-01

## 2021-01-01 RX ORDER — SODIUM CHLORIDE 0.9 % (FLUSH) 0.9 %
5-40 SYRINGE (ML) INJECTION AS NEEDED
Status: DISCONTINUED | OUTPATIENT
Start: 2021-01-01 | End: 2021-01-01

## 2021-01-01 RX ORDER — ZINC SULFATE 50(220)MG
1 CAPSULE ORAL DAILY
Status: DISCONTINUED | OUTPATIENT
Start: 2021-01-01 | End: 2021-01-01

## 2021-01-01 RX ORDER — FAMOTIDINE 40 MG/1
40 TABLET, FILM COATED ORAL DAILY
Status: ON HOLD | COMMUNITY
End: 2021-01-01

## 2021-01-01 RX ORDER — MIDAZOLAM IN 0.9 % SOD.CHLORID 1 MG/ML
0-10 PLASTIC BAG, INJECTION (ML) INTRAVENOUS
Status: DISCONTINUED | OUTPATIENT
Start: 2021-01-01 | End: 2021-01-01

## 2021-01-01 RX ORDER — INSULIN LISPRO 100 [IU]/ML
5 INJECTION, SOLUTION INTRAVENOUS; SUBCUTANEOUS
Status: DISCONTINUED | OUTPATIENT
Start: 2021-01-01 | End: 2021-01-01

## 2021-01-01 RX ORDER — FENTANYL CITRATE 50 UG/ML
50 INJECTION, SOLUTION INTRAMUSCULAR; INTRAVENOUS ONCE
Status: COMPLETED | OUTPATIENT
Start: 2021-01-01 | End: 2021-01-01

## 2021-01-01 RX ORDER — LIDOCAINE HYDROCHLORIDE AND EPINEPHRINE 20; 10 MG/ML; UG/ML
20-200 INJECTION, SOLUTION INFILTRATION; PERINEURAL ONCE
Status: COMPLETED | OUTPATIENT
Start: 2021-01-01 | End: 2021-01-01

## 2021-01-01 RX ORDER — ACETAMINOPHEN 325 MG/1
650 TABLET ORAL
Qty: 30 TAB | Refills: 0 | Status: SHIPPED | OUTPATIENT
Start: 2021-01-01 | End: 2021-01-01

## 2021-01-01 RX ORDER — SODIUM BICARBONATE 650 MG/1
TABLET ORAL 3 TIMES DAILY
COMMUNITY
End: 2021-01-01

## 2021-01-01 RX ORDER — POLYETHYLENE GLYCOL 3350 17 G/17G
17 POWDER, FOR SOLUTION ORAL DAILY
Qty: 30 PACKET | Refills: 0 | Status: SHIPPED | OUTPATIENT
Start: 2021-01-01 | End: 2021-01-01

## 2021-01-01 RX ORDER — MORPHINE SULFATE 2 MG/ML
2 INJECTION, SOLUTION INTRAMUSCULAR; INTRAVENOUS
Status: COMPLETED | OUTPATIENT
Start: 2021-01-01 | End: 2021-01-01

## 2021-01-01 RX ORDER — HEPARIN SODIUM 1000 [USP'U]/ML
3000 INJECTION, SOLUTION INTRAVENOUS; SUBCUTANEOUS
Status: DISCONTINUED | OUTPATIENT
Start: 2021-01-01 | End: 2021-01-01 | Stop reason: HOSPADM

## 2021-01-01 RX ORDER — ETOMIDATE 2 MG/ML
20 INJECTION INTRAVENOUS ONCE
Status: COMPLETED | OUTPATIENT
Start: 2021-01-01 | End: 2021-01-01

## 2021-01-01 RX ORDER — MELATONIN
4000 DAILY
Status: DISCONTINUED | OUTPATIENT
Start: 2021-01-01 | End: 2021-01-01 | Stop reason: HOSPADM

## 2021-01-01 RX ORDER — IBUPROFEN 200 MG
1 TABLET ORAL EVERY 24 HOURS
Status: DISCONTINUED | OUTPATIENT
Start: 2021-01-01 | End: 2021-01-01 | Stop reason: HOSPADM

## 2021-01-01 RX ORDER — NOREPINEPHRINE BITARTRATE/D5W 4MG/250ML
PLASTIC BAG, INJECTION (ML) INTRAVENOUS
Status: COMPLETED
Start: 2021-01-01 | End: 2021-01-01

## 2021-01-01 RX ORDER — SENNOSIDES 8.6 MG/1
2 TABLET ORAL
Status: DISCONTINUED | OUTPATIENT
Start: 2021-01-01 | End: 2021-01-01 | Stop reason: HOSPADM

## 2021-01-01 RX ORDER — MAGNESIUM SULFATE HEPTAHYDRATE 40 MG/ML
2 INJECTION, SOLUTION INTRAVENOUS ONCE
Status: COMPLETED | OUTPATIENT
Start: 2021-01-01 | End: 2021-01-01

## 2021-01-01 RX ORDER — DEXAMETHASONE SODIUM PHOSPHATE 100 MG/10ML
6 INJECTION INTRAMUSCULAR; INTRAVENOUS EVERY 24 HOURS
Status: DISCONTINUED | OUTPATIENT
Start: 2021-01-01 | End: 2021-01-01

## 2021-01-01 RX ORDER — CLONIDINE HYDROCHLORIDE 0.1 MG/1
0.2 TABLET ORAL
Status: DISCONTINUED | OUTPATIENT
Start: 2021-01-01 | End: 2021-01-01 | Stop reason: HOSPADM

## 2021-01-01 RX ORDER — GABAPENTIN 100 MG/1
100 CAPSULE ORAL 3 TIMES DAILY
Status: DISCONTINUED | OUTPATIENT
Start: 2021-01-01 | End: 2021-01-01

## 2021-01-01 RX ORDER — ONDANSETRON 4 MG/1
4 TABLET, ORALLY DISINTEGRATING ORAL
Status: DISCONTINUED | OUTPATIENT
Start: 2021-01-01 | End: 2021-01-01

## 2021-01-01 RX ORDER — FAMOTIDINE 20 MG/1
40 TABLET, FILM COATED ORAL DAILY
Status: DISCONTINUED | OUTPATIENT
Start: 2021-01-01 | End: 2021-01-01

## 2021-01-01 RX ORDER — INSULIN GLARGINE 100 [IU]/ML
12 INJECTION, SOLUTION SUBCUTANEOUS DAILY
Status: DISCONTINUED | OUTPATIENT
Start: 2021-01-01 | End: 2021-01-01 | Stop reason: HOSPADM

## 2021-01-01 RX ORDER — DEXAMETHASONE SODIUM PHOSPHATE 100 MG/10ML
6 INJECTION INTRAMUSCULAR; INTRAVENOUS
Status: COMPLETED | OUTPATIENT
Start: 2021-01-01 | End: 2021-01-01

## 2021-01-01 RX ORDER — ACETYLCYSTEINE 200 MG/ML
600 SOLUTION ORAL; RESPIRATORY (INHALATION) 2 TIMES DAILY
Status: DISCONTINUED | OUTPATIENT
Start: 2021-01-01 | End: 2021-01-01

## 2021-01-01 RX ORDER — FUROSEMIDE 10 MG/ML
20 INJECTION INTRAMUSCULAR; INTRAVENOUS ONCE
Status: COMPLETED | OUTPATIENT
Start: 2021-01-01 | End: 2021-01-01

## 2021-01-01 RX ORDER — DEXMEDETOMIDINE HYDROCHLORIDE 4 UG/ML
.1-1.5 INJECTION, SOLUTION INTRAVENOUS
Status: DISCONTINUED | OUTPATIENT
Start: 2021-01-01 | End: 2021-01-01

## 2021-01-01 RX ORDER — HEPARIN SODIUM 1000 [USP'U]/ML
3000 INJECTION, SOLUTION INTRAVENOUS; SUBCUTANEOUS ONCE
Status: COMPLETED | OUTPATIENT
Start: 2021-01-01 | End: 2021-01-01

## 2021-01-01 RX ORDER — ONDANSETRON 2 MG/ML
4 INJECTION INTRAMUSCULAR; INTRAVENOUS
Status: DISCONTINUED | OUTPATIENT
Start: 2021-01-01 | End: 2021-01-01

## 2021-01-01 RX ORDER — HEPARIN SODIUM 5000 [USP'U]/ML
5000 INJECTION, SOLUTION INTRAVENOUS; SUBCUTANEOUS EVERY 12 HOURS
Status: DISCONTINUED | OUTPATIENT
Start: 2021-01-01 | End: 2021-01-01 | Stop reason: HOSPADM

## 2021-01-01 RX ORDER — ATORVASTATIN CALCIUM 40 MG/1
40 TABLET, FILM COATED ORAL DAILY
Status: ON HOLD | COMMUNITY
End: 2021-01-01

## 2021-01-01 RX ORDER — FUROSEMIDE 10 MG/ML
40 INJECTION INTRAMUSCULAR; INTRAVENOUS
Status: COMPLETED | OUTPATIENT
Start: 2021-01-01 | End: 2021-01-01

## 2021-01-01 RX ORDER — LISINOPRIL 5 MG/1
10 TABLET ORAL DAILY
Status: DISCONTINUED | OUTPATIENT
Start: 2021-01-01 | End: 2021-01-01

## 2021-01-01 RX ORDER — MELATONIN
2000 DAILY
Status: DISCONTINUED | OUTPATIENT
Start: 2021-01-01 | End: 2021-01-01

## 2021-01-01 RX ORDER — NOREPINEPHRINE BITARTRATE/D5W 4MG/250ML
PLASTIC BAG, INJECTION (ML) INTRAVENOUS
Status: DISCONTINUED
Start: 2021-01-01 | End: 2021-01-01 | Stop reason: WASHOUT

## 2021-01-01 RX ORDER — INSULIN LISPRO 100 [IU]/ML
5 INJECTION, SOLUTION INTRAVENOUS; SUBCUTANEOUS
Status: DISCONTINUED | OUTPATIENT
Start: 2021-01-01 | End: 2021-01-01 | Stop reason: HOSPADM

## 2021-01-01 RX ORDER — IPRATROPIUM BROMIDE AND ALBUTEROL SULFATE 2.5; .5 MG/3ML; MG/3ML
3 SOLUTION RESPIRATORY (INHALATION)
Status: DISCONTINUED | OUTPATIENT
Start: 2021-01-01 | End: 2021-01-01 | Stop reason: HOSPADM

## 2021-01-01 RX ORDER — FENTANYL CITRATE 50 UG/ML
12.5-1 INJECTION, SOLUTION INTRAMUSCULAR; INTRAVENOUS
Status: DISCONTINUED | OUTPATIENT
Start: 2021-01-01 | End: 2021-01-01

## 2021-01-01 RX ORDER — TRAMADOL HYDROCHLORIDE 50 MG/1
50 TABLET ORAL
Status: DISCONTINUED | OUTPATIENT
Start: 2021-01-01 | End: 2021-01-01

## 2021-01-01 RX ORDER — ACETYLCYSTEINE 100 MG/ML
2 SOLUTION ORAL; RESPIRATORY (INHALATION)
Status: DISCONTINUED | OUTPATIENT
Start: 2021-01-01 | End: 2021-01-01

## 2021-01-01 RX ORDER — DEXAMETHASONE 6 MG/1
6 TABLET ORAL DAILY
Qty: 8 TABLET | Refills: 0 | Status: SHIPPED | OUTPATIENT
Start: 2021-01-01 | End: 2021-01-01

## 2021-01-01 RX ORDER — NIFEDIPINE 60 MG/1
60 TABLET, EXTENDED RELEASE ORAL DAILY
Status: ON HOLD | COMMUNITY
End: 2021-01-01

## 2021-01-01 RX ORDER — LIDOCAINE HYDROCHLORIDE 20 MG/ML
40-120 INJECTION, SOLUTION INFILTRATION; PERINEURAL ONCE
Status: COMPLETED | OUTPATIENT
Start: 2021-01-01 | End: 2021-01-01

## 2021-01-01 RX ORDER — LANOLIN ALCOHOL/MO/W.PET/CERES
650 CREAM (GRAM) TOPICAL 2 TIMES DAILY WITH MEALS
Status: DISCONTINUED | OUTPATIENT
Start: 2021-01-01 | End: 2021-01-01 | Stop reason: HOSPADM

## 2021-01-01 RX ORDER — LANOLIN ALCOHOL/MO/W.PET/CERES
650 CREAM (GRAM) TOPICAL 2 TIMES DAILY WITH MEALS
Qty: 60 TAB | Refills: 0 | Status: SHIPPED | OUTPATIENT
Start: 2021-01-01 | End: 2021-01-01

## 2021-01-01 RX ORDER — LANOLIN ALCOHOL/MO/W.PET/CERES
650 CREAM (GRAM) TOPICAL 2 TIMES DAILY WITH MEALS
Status: DISCONTINUED | OUTPATIENT
Start: 2021-01-01 | End: 2021-01-01

## 2021-01-01 RX ORDER — SODIUM CHLORIDE 9 MG/ML
250 INJECTION, SOLUTION INTRAVENOUS AS NEEDED
Status: DISCONTINUED | OUTPATIENT
Start: 2021-01-01 | End: 2021-01-01 | Stop reason: HOSPADM

## 2021-01-01 RX ORDER — LANOLIN ALCOHOL/MO/W.PET/CERES
2 CREAM (GRAM) TOPICAL 2 TIMES DAILY WITH MEALS
Status: DISCONTINUED | OUTPATIENT
Start: 2021-01-01 | End: 2021-01-01 | Stop reason: HOSPADM

## 2021-01-01 RX ORDER — HYDROCODONE BITARTRATE AND ACETAMINOPHEN 7.5; 325 MG/1; MG/1
1 TABLET ORAL
Qty: 15 TABLET | Refills: 0 | Status: SHIPPED | OUTPATIENT
Start: 2021-01-01 | End: 2021-01-01

## 2021-01-01 RX ORDER — GLIPIZIDE 5 MG/1
2.5 TABLET ORAL DAILY
Qty: 15 TABLET | Refills: 0 | Status: SHIPPED | OUTPATIENT
Start: 2021-01-01 | End: 2021-01-01

## 2021-01-01 RX ORDER — SODIUM CHLORIDE 0.9 % (FLUSH) 0.9 %
5-10 SYRINGE (ML) INJECTION EVERY 8 HOURS
Status: DISCONTINUED | OUTPATIENT
Start: 2021-01-01 | End: 2021-01-01

## 2021-01-01 RX ORDER — INSULIN GLARGINE 100 [IU]/ML
12 INJECTION, SOLUTION SUBCUTANEOUS DAILY
Status: DISCONTINUED | OUTPATIENT
Start: 2021-01-01 | End: 2021-01-01

## 2021-01-01 RX ORDER — GABAPENTIN 100 MG/1
100 CAPSULE ORAL 3 TIMES DAILY
Qty: 60 CAPSULE | Refills: 0 | Status: SHIPPED | OUTPATIENT
Start: 2021-01-01 | End: 2021-01-01

## 2021-01-01 RX ADMIN — FENTANYL CITRATE 50 MCG: 50 INJECTION, SOLUTION INTRAMUSCULAR; INTRAVENOUS at 12:28

## 2021-01-01 RX ADMIN — INSULIN LISPRO 6 UNITS: 100 INJECTION, SOLUTION INTRAVENOUS; SUBCUTANEOUS at 10:38

## 2021-01-01 RX ADMIN — Medication 10 ML: at 06:39

## 2021-01-01 RX ADMIN — FAMOTIDINE 20 MG: 20 TABLET ORAL at 09:14

## 2021-01-01 RX ADMIN — HEPARIN SODIUM 5000 UNITS: 5000 INJECTION INTRAVENOUS; SUBCUTANEOUS at 11:53

## 2021-01-01 RX ADMIN — INSULIN LISPRO 2 UNITS: 100 INJECTION, SOLUTION INTRAVENOUS; SUBCUTANEOUS at 06:37

## 2021-01-01 RX ADMIN — CARVEDILOL 25 MG: 25 TABLET, FILM COATED ORAL at 08:14

## 2021-01-01 RX ADMIN — SODIUM BICARBONATE 100 MEQ: 84 INJECTION, SOLUTION INTRAVENOUS at 07:00

## 2021-01-01 RX ADMIN — PHENYLEPHRINE HYDROCHLORIDE 300 MCG/MIN: 10 INJECTION INTRAVENOUS at 03:30

## 2021-01-01 RX ADMIN — Medication 10 ML: at 22:00

## 2021-01-01 RX ADMIN — NIFEDIPINE 90 MG: 90 TABLET, FILM COATED, EXTENDED RELEASE ORAL at 12:34

## 2021-01-01 RX ADMIN — HEPARIN SODIUM 3000 UNITS: 1000 INJECTION INTRAVENOUS; SUBCUTANEOUS at 08:19

## 2021-01-01 RX ADMIN — Medication 10 ML: at 14:14

## 2021-01-01 RX ADMIN — NIFEDIPINE 60 MG: 30 TABLET, FILM COATED, EXTENDED RELEASE ORAL at 08:15

## 2021-01-01 RX ADMIN — INSULIN HUMAN 10 UNITS: 100 INJECTION, SOLUTION PARENTERAL at 16:56

## 2021-01-01 RX ADMIN — INSULIN LISPRO 3 UNITS: 100 INJECTION, SOLUTION INTRAVENOUS; SUBCUTANEOUS at 09:34

## 2021-01-01 RX ADMIN — Medication 10 ML: at 21:12

## 2021-01-01 RX ADMIN — NIFEDIPINE 60 MG: 30 TABLET, FILM COATED, EXTENDED RELEASE ORAL at 16:38

## 2021-01-01 RX ADMIN — OXYCODONE HYDROCHLORIDE AND ACETAMINOPHEN 2000 MG: 500 TABLET ORAL at 08:56

## 2021-01-01 RX ADMIN — FUROSEMIDE 60 MG: 10 INJECTION, SOLUTION INTRAMUSCULAR; INTRAVENOUS at 08:27

## 2021-01-01 RX ADMIN — Medication 100 MEQ: at 07:00

## 2021-01-01 RX ADMIN — FERROUS SULFATE TAB 325 MG (65 MG ELEMENTAL FE) 650 MG: 325 (65 FE) TAB at 16:28

## 2021-01-01 RX ADMIN — Medication 10 ML: at 21:34

## 2021-01-01 RX ADMIN — CARVEDILOL 25 MG: 25 TABLET, FILM COATED ORAL at 16:28

## 2021-01-01 RX ADMIN — ATORVASTATIN CALCIUM 40 MG: 40 TABLET, FILM COATED ORAL at 09:36

## 2021-01-01 RX ADMIN — IPRATROPIUM BROMIDE AND ALBUTEROL SULFATE 3 ML: .5; 3 SOLUTION RESPIRATORY (INHALATION) at 19:54

## 2021-01-01 RX ADMIN — HEPARIN SODIUM 5000 UNITS: 5000 INJECTION INTRAVENOUS; SUBCUTANEOUS at 14:15

## 2021-01-01 RX ADMIN — FUROSEMIDE 60 MG: 10 INJECTION, SOLUTION INTRAMUSCULAR; INTRAVENOUS at 09:17

## 2021-01-01 RX ADMIN — FENTANYL CITRATE 50 MCG: 50 INJECTION INTRAMUSCULAR; INTRAVENOUS at 22:32

## 2021-01-01 RX ADMIN — HYDROCODONE BITARTRATE AND ACETAMINOPHEN 1 TABLET: 5; 325 TABLET ORAL at 02:45

## 2021-01-01 RX ADMIN — FERROUS SULFATE TAB 325 MG (65 MG ELEMENTAL FE) 650 MG: 325 (65 FE) TAB at 08:15

## 2021-01-01 RX ADMIN — Medication 10 ML: at 19:47

## 2021-01-01 RX ADMIN — CARVEDILOL 25 MG: 25 TABLET, FILM COATED ORAL at 08:37

## 2021-01-01 RX ADMIN — VASOPRESSIN 0.1 UNITS/MIN: 20 INJECTION INTRAVENOUS at 07:16

## 2021-01-01 RX ADMIN — INSULIN LISPRO 5 UNITS: 100 INJECTION, SOLUTION INTRAVENOUS; SUBCUTANEOUS at 12:32

## 2021-01-01 RX ADMIN — FUROSEMIDE 20 MG: 10 INJECTION, SOLUTION INTRAMUSCULAR; INTRAVENOUS at 14:25

## 2021-01-01 RX ADMIN — SODIUM BICARBONATE 50 MEQ: 84 INJECTION, SOLUTION INTRAVENOUS at 05:35

## 2021-01-01 RX ADMIN — HEPARIN SODIUM 5000 UNITS: 5000 INJECTION INTRAVENOUS; SUBCUTANEOUS at 05:41

## 2021-01-01 RX ADMIN — Medication 10 ML: at 05:41

## 2021-01-01 RX ADMIN — HEPARIN SODIUM 5000 UNITS: 5000 INJECTION INTRAVENOUS; SUBCUTANEOUS at 13:32

## 2021-01-01 RX ADMIN — Medication 10 ML: at 05:08

## 2021-01-01 RX ADMIN — ATORVASTATIN CALCIUM 40 MG: 40 TABLET, FILM COATED ORAL at 08:28

## 2021-01-01 RX ADMIN — HEPARIN SODIUM 5000 UNITS: 5000 INJECTION INTRAVENOUS; SUBCUTANEOUS at 14:51

## 2021-01-01 RX ADMIN — VITAMIN D, TAB 1000IU (100/BT) 4000 UNITS: 25 TAB at 13:35

## 2021-01-01 RX ADMIN — HEPARIN SODIUM 3000 UNITS: 1000 INJECTION, SOLUTION INTRAVENOUS; SUBCUTANEOUS at 07:57

## 2021-01-01 RX ADMIN — FAMOTIDINE 40 MG: 20 TABLET ORAL at 08:28

## 2021-01-01 RX ADMIN — SODIUM ZIRCONIUM CYCLOSILICATE 15 G: 10 POWDER, FOR SUSPENSION ORAL at 19:47

## 2021-01-01 RX ADMIN — SODIUM BICARBONATE 650 MG TABLET 650 MG: at 21:55

## 2021-01-01 RX ADMIN — FERROUS SULFATE TAB 325 MG (65 MG ELEMENTAL FE) 650 MG: 325 (65 FE) TAB at 17:28

## 2021-01-01 RX ADMIN — HEPARIN SODIUM 5000 UNITS: 5000 INJECTION INTRAVENOUS; SUBCUTANEOUS at 06:00

## 2021-01-01 RX ADMIN — FAMOTIDINE 20 MG: 20 TABLET, FILM COATED ORAL at 08:15

## 2021-01-01 RX ADMIN — CARVEDILOL 25 MG: 25 TABLET, FILM COATED ORAL at 17:01

## 2021-01-01 RX ADMIN — DEXTROSE MONOHYDRATE 25 G: 25 INJECTION, SOLUTION INTRAVENOUS at 16:49

## 2021-01-01 RX ADMIN — HEPARIN SODIUM 5000 UNITS: 5000 INJECTION INTRAVENOUS; SUBCUTANEOUS at 05:43

## 2021-01-01 RX ADMIN — VITAMIN D, TAB 1000IU (100/BT) 2000 UNITS: 25 TAB at 09:14

## 2021-01-01 RX ADMIN — HEPARIN SODIUM 3000 UNITS: 1000 INJECTION INTRAVENOUS; SUBCUTANEOUS at 22:01

## 2021-01-01 RX ADMIN — NIFEDIPINE 90 MG: 30 TABLET, EXTENDED RELEASE ORAL at 08:38

## 2021-01-01 RX ADMIN — ATORVASTATIN CALCIUM 40 MG: 40 TABLET, FILM COATED ORAL at 09:45

## 2021-01-01 RX ADMIN — ONDANSETRON 4 MG: 2 INJECTION INTRAMUSCULAR; INTRAVENOUS at 14:00

## 2021-01-01 RX ADMIN — INSULIN LISPRO 4 UNITS: 100 INJECTION, SOLUTION INTRAVENOUS; SUBCUTANEOUS at 17:06

## 2021-01-01 RX ADMIN — HEPARIN SODIUM 5000 UNITS: 5000 INJECTION INTRAVENOUS; SUBCUTANEOUS at 21:13

## 2021-01-01 RX ADMIN — FENTANYL CITRATE 50 MCG: 50 INJECTION INTRAMUSCULAR; INTRAVENOUS at 03:20

## 2021-01-01 RX ADMIN — FUROSEMIDE 60 MG: 10 INJECTION, SOLUTION INTRAMUSCULAR; INTRAVENOUS at 17:04

## 2021-01-01 RX ADMIN — FERROUS SULFATE TAB 325 MG (65 MG ELEMENTAL FE) 650 MG: 325 (65 FE) TAB at 17:01

## 2021-01-01 RX ADMIN — FUROSEMIDE 60 MG: 10 INJECTION, SOLUTION INTRAMUSCULAR; INTRAVENOUS at 16:28

## 2021-01-01 RX ADMIN — Medication 10 ML: at 13:33

## 2021-01-01 RX ADMIN — HYDROCODONE BITARTRATE AND ACETAMINOPHEN 1 TABLET: 5; 325 TABLET ORAL at 09:45

## 2021-01-01 RX ADMIN — INSULIN LISPRO 5 UNITS: 100 INJECTION, SOLUTION INTRAVENOUS; SUBCUTANEOUS at 08:01

## 2021-01-01 RX ADMIN — ONDANSETRON 4 MG: 2 INJECTION INTRAMUSCULAR; INTRAVENOUS at 00:24

## 2021-01-01 RX ADMIN — FENTANYL CITRATE 50 MCG/HR: 50 INJECTION INTRAVENOUS at 03:29

## 2021-01-01 RX ADMIN — INSULIN LISPRO 4 UNITS: 100 INJECTION, SOLUTION INTRAVENOUS; SUBCUTANEOUS at 11:37

## 2021-01-01 RX ADMIN — Medication 100 MG: at 03:22

## 2021-01-01 RX ADMIN — ETOMIDATE 20 MG: 2 INJECTION INTRAVENOUS at 03:20

## 2021-01-01 RX ADMIN — PIPERACILLIN SODIUM AND TAZOBACTAM SODIUM 3.38 G: 3; .375 INJECTION, POWDER, LYOPHILIZED, FOR SOLUTION INTRAVENOUS at 21:53

## 2021-01-01 RX ADMIN — CARVEDILOL 25 MG: 25 TABLET, FILM COATED ORAL at 08:16

## 2021-01-01 RX ADMIN — FAMOTIDINE 20 MG: 20 TABLET, FILM COATED ORAL at 09:36

## 2021-01-01 RX ADMIN — Medication 10 ML: at 13:15

## 2021-01-01 RX ADMIN — HYDRALAZINE HYDROCHLORIDE 10 MG: 10 TABLET, FILM COATED ORAL at 21:10

## 2021-01-01 RX ADMIN — PIPERACILLIN SODIUM AND TAZOBACTAM SODIUM 4.5 G: 4; .5 INJECTION, POWDER, LYOPHILIZED, FOR SOLUTION INTRAVENOUS at 13:15

## 2021-01-01 RX ADMIN — Medication 10 ML: at 21:33

## 2021-01-01 RX ADMIN — FAMOTIDINE 20 MG: 20 TABLET, FILM COATED ORAL at 08:16

## 2021-01-01 RX ADMIN — FAMOTIDINE 40 MG: 20 TABLET ORAL at 09:17

## 2021-01-01 RX ADMIN — INSULIN LISPRO 7 UNITS: 100 INJECTION, SOLUTION INTRAVENOUS; SUBCUTANEOUS at 18:04

## 2021-01-01 RX ADMIN — PHENYLEPHRINE HYDROCHLORIDE 300 MCG/MIN: 10 INJECTION INTRAVENOUS at 07:16

## 2021-01-01 RX ADMIN — INSULIN LISPRO 4 UNITS: 100 INJECTION, SOLUTION INTRAVENOUS; SUBCUTANEOUS at 11:49

## 2021-01-01 RX ADMIN — SODIUM BICARBONATE 650 MG TABLET 650 MG: at 21:23

## 2021-01-01 RX ADMIN — ACETYLCYSTEINE 200 MG: 100 SOLUTION ORAL; RESPIRATORY (INHALATION) at 08:34

## 2021-01-01 RX ADMIN — ACETYLCYSTEINE 600 MG: 200 SOLUTION ORAL; RESPIRATORY (INHALATION) at 09:30

## 2021-01-01 RX ADMIN — Medication 4 MCG/MIN: at 21:23

## 2021-01-01 RX ADMIN — FERROUS SULFATE TAB 325 MG (65 MG ELEMENTAL FE) 650 MG: 325 (65 FE) TAB at 17:12

## 2021-01-01 RX ADMIN — OXYCODONE HYDROCHLORIDE AND ACETAMINOPHEN 2000 MG: 500 TABLET ORAL at 13:34

## 2021-01-01 RX ADMIN — DEXAMETHASONE SODIUM PHOSPHATE 6 MG: 10 INJECTION INTRAMUSCULAR; INTRAVENOUS at 08:55

## 2021-01-01 RX ADMIN — HEPARIN SODIUM 5000 UNITS: 5000 INJECTION INTRAVENOUS; SUBCUTANEOUS at 23:00

## 2021-01-01 RX ADMIN — ZINC SULFATE 220 MG (50 MG) CAPSULE 1 CAPSULE: CAPSULE at 10:37

## 2021-01-01 RX ADMIN — Medication 10 ML: at 21:43

## 2021-01-01 RX ADMIN — FAMOTIDINE 20 MG: 10 INJECTION INTRAVENOUS at 13:09

## 2021-01-01 RX ADMIN — FUROSEMIDE 60 MG: 10 INJECTION, SOLUTION INTRAMUSCULAR; INTRAVENOUS at 18:21

## 2021-01-01 RX ADMIN — SODIUM BICARBONATE 650 MG TABLET 650 MG: at 21:13

## 2021-01-01 RX ADMIN — FAMOTIDINE 20 MG: 20 TABLET, FILM COATED ORAL at 12:34

## 2021-01-01 RX ADMIN — HEPARIN SODIUM 5000 UNITS: 5000 INJECTION INTRAVENOUS; SUBCUTANEOUS at 21:11

## 2021-01-01 RX ADMIN — SODIUM BICARBONATE 650 MG TABLET 650 MG: at 16:59

## 2021-01-01 RX ADMIN — Medication 1 TUBE: at 06:35

## 2021-01-01 RX ADMIN — HEPARIN SODIUM 5000 UNITS: 5000 INJECTION INTRAVENOUS; SUBCUTANEOUS at 21:53

## 2021-01-01 RX ADMIN — SODIUM CHLORIDE 500 ML: 900 INJECTION, SOLUTION INTRAVENOUS at 01:24

## 2021-01-01 RX ADMIN — VITAMIN D, TAB 1000IU (100/BT) 4000 UNITS: 25 TAB at 11:54

## 2021-01-01 RX ADMIN — FERROUS SULFATE TAB 325 MG (65 MG ELEMENTAL FE) 650 MG: 325 (65 FE) TAB at 09:36

## 2021-01-01 RX ADMIN — OXYCODONE HYDROCHLORIDE AND ACETAMINOPHEN 2000 MG: 500 TABLET ORAL at 09:15

## 2021-01-01 RX ADMIN — MORPHINE SULFATE 4 MG: 4 INJECTION INTRAVENOUS at 14:00

## 2021-01-01 RX ADMIN — FENTANYL CITRATE 50 MCG: 50 INJECTION, SOLUTION INTRAMUSCULAR; INTRAVENOUS at 03:20

## 2021-01-01 RX ADMIN — PHENYLEPHRINE HYDROCHLORIDE 300 MCG/MIN: 10 INJECTION INTRAVENOUS at 05:20

## 2021-01-01 RX ADMIN — ATORVASTATIN CALCIUM 40 MG: 40 TABLET, FILM COATED ORAL at 09:17

## 2021-01-01 RX ADMIN — LIDOCAINE HYDROCHLORIDE 80 MG: 20 INJECTION, SOLUTION INFILTRATION; PERINEURAL at 12:39

## 2021-01-01 RX ADMIN — HEPARIN SODIUM 5000 UNITS: 5000 INJECTION INTRAVENOUS; SUBCUTANEOUS at 21:42

## 2021-01-01 RX ADMIN — HEPARIN SODIUM 5000 UNITS: 5000 INJECTION INTRAVENOUS; SUBCUTANEOUS at 05:25

## 2021-01-01 RX ADMIN — MIDAZOLAM 1 MG: 1 INJECTION INTRAMUSCULAR; INTRAVENOUS at 12:28

## 2021-01-01 RX ADMIN — Medication 10 ML: at 05:16

## 2021-01-01 RX ADMIN — SODIUM BICARBONATE 650 MG TABLET 650 MG: at 18:02

## 2021-01-01 RX ADMIN — INSULIN LISPRO 4 UNITS: 100 INJECTION, SOLUTION INTRAVENOUS; SUBCUTANEOUS at 17:04

## 2021-01-01 RX ADMIN — MORPHINE SULFATE 2 MG: 2 INJECTION, SOLUTION INTRAMUSCULAR; INTRAVENOUS at 01:15

## 2021-01-01 RX ADMIN — Medication 10 ML: at 06:17

## 2021-01-01 RX ADMIN — INSULIN LISPRO 2 UNITS: 100 INJECTION, SOLUTION INTRAVENOUS; SUBCUTANEOUS at 06:17

## 2021-01-01 RX ADMIN — HEPARIN SODIUM 5000 UNITS: 5000 INJECTION INTRAVENOUS; SUBCUTANEOUS at 22:05

## 2021-01-01 RX ADMIN — LISINOPRIL 10 MG: 5 TABLET ORAL at 08:38

## 2021-01-01 RX ADMIN — CARVEDILOL 25 MG: 25 TABLET, FILM COATED ORAL at 09:36

## 2021-01-01 RX ADMIN — SODIUM BICARBONATE 650 MG TABLET 650 MG: at 21:53

## 2021-01-01 RX ADMIN — WATER 20 NG/KG/MIN: 1 SOLUTION INTRAVENOUS at 05:39

## 2021-01-01 RX ADMIN — INSULIN LISPRO 2 UNITS: 100 INJECTION, SOLUTION INTRAVENOUS; SUBCUTANEOUS at 12:33

## 2021-01-01 RX ADMIN — OXYCODONE HYDROCHLORIDE AND ACETAMINOPHEN 2000 MG: 500 TABLET ORAL at 11:54

## 2021-01-01 RX ADMIN — ACETYLCYSTEINE 600 MG: 200 SOLUTION ORAL; RESPIRATORY (INHALATION) at 13:35

## 2021-01-01 RX ADMIN — HEPARIN SODIUM 2000 UNITS: 1000 INJECTION, SOLUTION INTRAVENOUS; SUBCUTANEOUS at 16:09

## 2021-01-01 RX ADMIN — INSULIN LISPRO 10 UNITS: 100 INJECTION, SOLUTION INTRAVENOUS; SUBCUTANEOUS at 21:02

## 2021-01-01 RX ADMIN — MIDAZOLAM 1 MG: 1 INJECTION INTRAMUSCULAR; INTRAVENOUS at 12:39

## 2021-01-01 RX ADMIN — SODIUM CHLORIDE 25 ML/HR: 900 INJECTION, SOLUTION INTRAVENOUS at 12:24

## 2021-01-01 RX ADMIN — NOREPINEPHRINE BITARTRATE 30 MCG/MIN: 1 INJECTION, SOLUTION, CONCENTRATE INTRAVENOUS at 09:00

## 2021-01-01 RX ADMIN — MIDAZOLAM 2 MG/HR: 5 INJECTION INTRAMUSCULAR; INTRAVENOUS at 04:17

## 2021-01-01 RX ADMIN — HYDRALAZINE HYDROCHLORIDE 10 MG: 10 TABLET, FILM COATED ORAL at 08:37

## 2021-01-01 RX ADMIN — INSULIN LISPRO 2 UNITS: 100 INJECTION, SOLUTION INTRAVENOUS; SUBCUTANEOUS at 17:28

## 2021-01-01 RX ADMIN — POLYETHYLENE GLYCOL 3350 17 G: 17 POWDER, FOR SOLUTION ORAL at 19:57

## 2021-01-01 RX ADMIN — ALTEPLASE 1 MG: 2.2 INJECTION, POWDER, LYOPHILIZED, FOR SOLUTION INTRAVENOUS at 09:38

## 2021-01-01 RX ADMIN — INSULIN LISPRO 5 UNITS: 100 INJECTION, SOLUTION INTRAVENOUS; SUBCUTANEOUS at 18:03

## 2021-01-01 RX ADMIN — MORPHINE SULFATE 2 MG: 2 INJECTION, SOLUTION INTRAMUSCULAR; INTRAVENOUS at 12:15

## 2021-01-01 RX ADMIN — HYDRALAZINE HYDROCHLORIDE 10 MG: 20 INJECTION INTRAMUSCULAR; INTRAVENOUS at 23:28

## 2021-01-01 RX ADMIN — Medication 10 ML: at 21:25

## 2021-01-01 RX ADMIN — HYDROCODONE BITARTRATE AND ACETAMINOPHEN 1 TABLET: 5; 325 TABLET ORAL at 00:14

## 2021-01-01 RX ADMIN — VASOPRESSIN 0.03 UNITS/MIN: 20 INJECTION INTRAVENOUS at 03:40

## 2021-01-01 RX ADMIN — CALCIUM CHLORIDE 1 G: 100 INJECTION INTRAVENOUS; INTRAVENTRICULAR at 10:20

## 2021-01-01 RX ADMIN — LABETALOL HYDROCHLORIDE 20 MG: 5 INJECTION INTRAVENOUS at 12:05

## 2021-01-01 RX ADMIN — SODIUM BICARBONATE 650 MG TABLET 650 MG: at 08:15

## 2021-01-01 RX ADMIN — HEPARIN SODIUM 5000 UNITS: 5000 INJECTION INTRAVENOUS; SUBCUTANEOUS at 21:23

## 2021-01-01 RX ADMIN — SODIUM BICARBONATE 100 MEQ: 84 INJECTION, SOLUTION INTRAVENOUS at 06:37

## 2021-01-01 RX ADMIN — VANCOMYCIN HYDROCHLORIDE 1750 MG: 10 INJECTION, POWDER, LYOPHILIZED, FOR SOLUTION INTRAVENOUS at 13:53

## 2021-01-01 RX ADMIN — TOCILIZUMAB 486 MG: 180 INJECTION, SOLUTION SUBCUTANEOUS at 01:29

## 2021-01-01 RX ADMIN — NIFEDIPINE 60 MG: 30 TABLET, FILM COATED, EXTENDED RELEASE ORAL at 08:28

## 2021-01-01 RX ADMIN — Medication 10 ML: at 22:04

## 2021-01-01 RX ADMIN — HYDROCODONE BITARTRATE AND ACETAMINOPHEN 1 TABLET: 5; 325 TABLET ORAL at 14:55

## 2021-01-01 RX ADMIN — INSULIN LISPRO 2 UNITS: 100 INJECTION, SOLUTION INTRAVENOUS; SUBCUTANEOUS at 21:23

## 2021-01-01 RX ADMIN — VANCOMYCIN HYDROCHLORIDE 500 MG: 500 INJECTION, POWDER, LYOPHILIZED, FOR SOLUTION INTRAVENOUS at 09:47

## 2021-01-01 RX ADMIN — SODIUM BICARBONATE 650 MG TABLET 650 MG: at 21:25

## 2021-01-01 RX ADMIN — DEXAMETHASONE SODIUM PHOSPHATE 6 MG: 10 INJECTION, SOLUTION INTRAMUSCULAR; INTRAVENOUS at 20:00

## 2021-01-01 RX ADMIN — Medication 5 ML: at 14:04

## 2021-01-01 RX ADMIN — CARVEDILOL 25 MG: 25 TABLET, FILM COATED ORAL at 08:11

## 2021-01-01 RX ADMIN — FAMOTIDINE 20 MG: 20 TABLET, FILM COATED ORAL at 08:38

## 2021-01-01 RX ADMIN — SODIUM CHLORIDE 500 ML: 900 INJECTION, SOLUTION INTRAVENOUS at 00:08

## 2021-01-01 RX ADMIN — Medication 10 ML: at 05:49

## 2021-01-01 RX ADMIN — FENTANYL CITRATE 50 MCG: 50 INJECTION, SOLUTION INTRAMUSCULAR; INTRAVENOUS at 12:41

## 2021-01-01 RX ADMIN — ETOMIDATE 20 MG: 2 INJECTION, SOLUTION INTRAVENOUS at 03:20

## 2021-01-01 RX ADMIN — SODIUM BICARBONATE 650 MG TABLET 650 MG: at 17:28

## 2021-01-01 RX ADMIN — ACETYLCYSTEINE 600 MG: 200 SOLUTION ORAL; RESPIRATORY (INHALATION) at 19:48

## 2021-01-01 RX ADMIN — INSULIN HUMAN 5 UNITS: 100 INJECTION, SOLUTION PARENTERAL at 22:44

## 2021-01-01 RX ADMIN — DEXAMETHASONE SODIUM PHOSPHATE 6 MG: 10 INJECTION INTRAMUSCULAR; INTRAVENOUS at 09:21

## 2021-01-01 RX ADMIN — Medication 10 ML: at 06:00

## 2021-01-01 RX ADMIN — Medication 10 ML: at 06:01

## 2021-01-01 RX ADMIN — CARVEDILOL 25 MG: 25 TABLET, FILM COATED ORAL at 17:04

## 2021-01-01 RX ADMIN — CARVEDILOL 25 MG: 25 TABLET, FILM COATED ORAL at 17:28

## 2021-01-01 RX ADMIN — Medication 10 ML: at 06:34

## 2021-01-01 RX ADMIN — DEXMEDETOMIDINE HYDROCHLORIDE 0.2 MCG/KG/HR: 400 INJECTION INTRAVENOUS at 02:14

## 2021-01-01 RX ADMIN — NIFEDIPINE 60 MG: 30 TABLET, FILM COATED, EXTENDED RELEASE ORAL at 09:16

## 2021-01-01 RX ADMIN — ZINC SULFATE 220 MG (50 MG) CAPSULE 1 CAPSULE: CAPSULE at 11:53

## 2021-01-01 RX ADMIN — INSULIN LISPRO 4 UNITS: 100 INJECTION, SOLUTION INTRAVENOUS; SUBCUTANEOUS at 06:00

## 2021-01-01 RX ADMIN — INSULIN LISPRO 8 UNITS: 100 INJECTION, SOLUTION INTRAVENOUS; SUBCUTANEOUS at 22:05

## 2021-01-01 RX ADMIN — MORPHINE SULFATE 4 MG: 4 INJECTION INTRAVENOUS at 15:11

## 2021-01-01 RX ADMIN — INSULIN LISPRO 2 UNITS: 100 INJECTION, SOLUTION INTRAVENOUS; SUBCUTANEOUS at 06:01

## 2021-01-01 RX ADMIN — INSULIN LISPRO 5 UNITS: 100 INJECTION, SOLUTION INTRAVENOUS; SUBCUTANEOUS at 21:41

## 2021-01-01 RX ADMIN — FUROSEMIDE 60 MG: 10 INJECTION, SOLUTION INTRAMUSCULAR; INTRAVENOUS at 09:46

## 2021-01-01 RX ADMIN — ONDANSETRON 4 MG: 2 INJECTION INTRAMUSCULAR; INTRAVENOUS at 12:13

## 2021-01-01 RX ADMIN — HEPARIN SODIUM 5000 UNITS: 5000 INJECTION INTRAVENOUS; SUBCUTANEOUS at 05:35

## 2021-01-01 RX ADMIN — SODIUM BICARBONATE 650 MG TABLET 650 MG: at 12:34

## 2021-01-01 RX ADMIN — DIPHENHYDRAMINE HYDROCHLORIDE 25 MG: 25 CAPSULE ORAL at 15:13

## 2021-01-01 RX ADMIN — INSULIN LISPRO 2 UNITS: 100 INJECTION, SOLUTION INTRAVENOUS; SUBCUTANEOUS at 21:13

## 2021-01-01 RX ADMIN — CARVEDILOL 25 MG: 25 TABLET, FILM COATED ORAL at 17:12

## 2021-01-01 RX ADMIN — ATORVASTATIN CALCIUM 40 MG: 40 TABLET, FILM COATED ORAL at 08:38

## 2021-01-01 RX ADMIN — Medication 5 ML: at 16:34

## 2021-01-01 RX ADMIN — MAGNESIUM SULFATE HEPTAHYDRATE 2 G: 40 INJECTION, SOLUTION INTRAVENOUS at 14:14

## 2021-01-01 RX ADMIN — HEPARIN SODIUM 5000 UNITS: 5000 INJECTION INTRAVENOUS; SUBCUTANEOUS at 05:54

## 2021-01-01 RX ADMIN — Medication 10 ML: at 05:40

## 2021-01-01 RX ADMIN — INSULIN LISPRO 3 UNITS: 100 INJECTION, SOLUTION INTRAVENOUS; SUBCUTANEOUS at 21:49

## 2021-01-01 RX ADMIN — Medication 10 ML: at 21:55

## 2021-01-01 RX ADMIN — NOREPINEPHRINE BITARTRATE 20 MCG/MIN: 1 INJECTION, SOLUTION, CONCENTRATE INTRAVENOUS at 23:34

## 2021-01-01 RX ADMIN — DEXAMETHASONE SODIUM PHOSPHATE 6 MG: 10 INJECTION INTRAMUSCULAR; INTRAVENOUS at 11:53

## 2021-01-01 RX ADMIN — SODIUM BICARBONATE 650 MG TABLET 650 MG: at 17:04

## 2021-01-01 RX ADMIN — SODIUM CHLORIDE 500 ML: 900 INJECTION, SOLUTION INTRAVENOUS at 14:50

## 2021-01-01 RX ADMIN — HYDROCODONE BITARTRATE AND ACETAMINOPHEN 1 TABLET: 5; 325 TABLET ORAL at 16:41

## 2021-01-01 RX ADMIN — INSULIN LISPRO 6 UNITS: 100 INJECTION, SOLUTION INTRAVENOUS; SUBCUTANEOUS at 17:20

## 2021-01-01 RX ADMIN — HYDROCODONE BITARTRATE AND ACETAMINOPHEN 1 TABLET: 5; 325 TABLET ORAL at 08:43

## 2021-01-01 RX ADMIN — ATORVASTATIN CALCIUM 40 MG: 40 TABLET, FILM COATED ORAL at 08:16

## 2021-01-01 RX ADMIN — LABETALOL HYDROCHLORIDE 20 MG: 5 INJECTION INTRAVENOUS at 13:08

## 2021-01-01 RX ADMIN — SODIUM BICARBONATE 650 MG TABLET 650 MG: at 09:17

## 2021-01-01 RX ADMIN — SUCCINYLCHOLINE CHLORIDE 20 MG/ML INJECTION SOLUTION 100 MG: SOLUTION at 03:22

## 2021-01-01 RX ADMIN — Medication 100 MEQ: at 06:37

## 2021-01-01 RX ADMIN — FUROSEMIDE 40 MG: 10 INJECTION, SOLUTION INTRAMUSCULAR; INTRAVENOUS at 12:05

## 2021-01-01 RX ADMIN — ZINC SULFATE 220 MG (50 MG) CAPSULE 1 CAPSULE: CAPSULE at 08:56

## 2021-01-01 RX ADMIN — INSULIN LISPRO 6 UNITS: 100 INJECTION, SOLUTION INTRAVENOUS; SUBCUTANEOUS at 21:33

## 2021-01-01 RX ADMIN — SODIUM CHLORIDE 1000 ML: 900 INJECTION, SOLUTION INTRAVENOUS at 02:14

## 2021-01-01 RX ADMIN — SODIUM BICARBONATE 650 MG TABLET 650 MG: at 16:28

## 2021-01-01 RX ADMIN — CARVEDILOL 25 MG: 25 TABLET, FILM COATED ORAL at 08:28

## 2021-01-01 RX ADMIN — SODIUM CHLORIDE 500 ML: 900 INJECTION, SOLUTION INTRAVENOUS at 13:52

## 2021-01-01 RX ADMIN — TUBERCULIN PURIFIED PROTEIN DERIVATIVE 5 UNITS: 5 INJECTION, SOLUTION INTRADERMAL at 17:10

## 2021-01-01 RX ADMIN — PHENYLEPHRINE HYDROCHLORIDE 300 MCG/MIN: 10 INJECTION INTRAVENOUS at 09:02

## 2021-01-01 RX ADMIN — CARVEDILOL 25 MG: 25 TABLET, FILM COATED ORAL at 09:17

## 2021-01-01 RX ADMIN — HEPARIN SODIUM 5000 UNITS: 5000 INJECTION INTRAVENOUS; SUBCUTANEOUS at 21:03

## 2021-01-01 RX ADMIN — HEPARIN SODIUM 5000 UNITS: 5000 INJECTION INTRAVENOUS; SUBCUTANEOUS at 21:24

## 2021-01-01 RX ADMIN — PHENYLEPHRINE HYDROCHLORIDE 300 MCG/MIN: 10 INJECTION INTRAVENOUS at 10:36

## 2021-01-01 RX ADMIN — GUAIFENESIN AND CODEINE PHOSPHATE 10 ML: 100; 10 SOLUTION ORAL at 17:20

## 2021-01-01 RX ADMIN — HEPARIN SODIUM 5000 UNITS: 5000 INJECTION INTRAVENOUS; SUBCUTANEOUS at 12:33

## 2021-01-01 RX ADMIN — CARVEDILOL 25 MG: 25 TABLET, FILM COATED ORAL at 16:59

## 2021-01-01 RX ADMIN — LISINOPRIL 10 MG: 5 TABLET ORAL at 14:51

## 2021-01-01 RX ADMIN — LIDOCAINE HYDROCHLORIDE,EPINEPHRINE BITARTRATE 160 MG: 20; .01 INJECTION, SOLUTION INFILTRATION; PERINEURAL at 12:40

## 2021-01-01 RX ADMIN — INSULIN LISPRO 8 UNITS: 100 INJECTION, SOLUTION INTRAVENOUS; SUBCUTANEOUS at 16:58

## 2021-01-01 RX ADMIN — Medication 10 ML: at 14:42

## 2021-01-01 RX ADMIN — MIDAZOLAM 1 MG: 1 INJECTION INTRAMUSCULAR; INTRAVENOUS at 22:31

## 2021-01-01 RX ADMIN — VITAMIN D, TAB 1000IU (100/BT) 4000 UNITS: 25 TAB at 08:56

## 2021-01-01 RX ADMIN — HEPARIN SODIUM 5000 UNITS: 5000 INJECTION INTRAVENOUS; SUBCUTANEOUS at 06:16

## 2021-01-01 RX ADMIN — HEPARIN SODIUM 3200 UNITS: 1000 INJECTION INTRAVENOUS; SUBCUTANEOUS at 12:46

## 2021-01-01 RX ADMIN — HEPARIN SODIUM 5000 UNITS: 5000 INJECTION INTRAVENOUS; SUBCUTANEOUS at 10:37

## 2021-01-01 RX ADMIN — SODIUM BICARBONATE 50 MEQ: 84 INJECTION, SOLUTION INTRAVENOUS at 16:58

## 2021-01-01 RX ADMIN — SODIUM BICARBONATE 650 MG TABLET 650 MG: at 09:45

## 2021-01-01 RX ADMIN — FERROUS SULFATE TAB 325 MG (65 MG ELEMENTAL FE) 650 MG: 325 (65 FE) TAB at 08:37

## 2021-01-01 RX ADMIN — SODIUM BICARBONATE: 84 INJECTION, SOLUTION INTRAVENOUS at 06:45

## 2021-01-01 RX ADMIN — CARVEDILOL 25 MG: 25 TABLET, FILM COATED ORAL at 18:02

## 2021-01-01 RX ADMIN — INSULIN LISPRO 5 UNITS: 100 INJECTION, SOLUTION INTRAVENOUS; SUBCUTANEOUS at 12:34

## 2021-01-01 RX ADMIN — NIFEDIPINE 90 MG: 90 TABLET, FILM COATED, EXTENDED RELEASE ORAL at 08:15

## 2021-01-01 RX ADMIN — Medication 5 ML: at 21:05

## 2021-01-01 RX ADMIN — HEPARIN SODIUM 5000 UNITS: 5000 INJECTION INTRAVENOUS; SUBCUTANEOUS at 06:21

## 2021-01-01 RX ADMIN — ONDANSETRON 4 MG: 4 TABLET, ORALLY DISINTEGRATING ORAL at 18:17

## 2021-01-01 RX ADMIN — INSULIN LISPRO 2 UNITS: 100 INJECTION, SOLUTION INTRAVENOUS; SUBCUTANEOUS at 17:09

## 2021-01-01 RX ADMIN — INSULIN LISPRO 6 UNITS: 100 INJECTION, SOLUTION INTRAVENOUS; SUBCUTANEOUS at 21:12

## 2021-01-01 RX ADMIN — SODIUM BICARBONATE 50 MEQ: 84 INJECTION, SOLUTION INTRAVENOUS at 08:34

## 2021-01-01 RX ADMIN — ACETAMINOPHEN 650 MG: 325 TABLET ORAL at 16:28

## 2021-01-01 RX ADMIN — ACETYLCYSTEINE 200 MG: 100 SOLUTION ORAL; RESPIRATORY (INHALATION) at 19:54

## 2021-01-01 RX ADMIN — HYDRALAZINE HYDROCHLORIDE 10 MG: 10 TABLET, FILM COATED ORAL at 17:01

## 2021-01-01 RX ADMIN — Medication 10 ML: at 06:20

## 2021-01-01 RX ADMIN — INSULIN LISPRO 6 UNITS: 100 INJECTION, SOLUTION INTRAVENOUS; SUBCUTANEOUS at 21:24

## 2021-01-01 RX ADMIN — FERROUS SULFATE TAB 325 MG (65 MG ELEMENTAL FE) 650 MG: 325 (65 FE) TAB at 18:02

## 2021-01-01 RX ADMIN — SODIUM CHLORIDE 5.9 ML/HR: 9 INJECTION, SOLUTION INTRAVENOUS at 06:00

## 2021-01-01 RX ADMIN — Medication 10 ML: at 05:43

## 2021-01-01 RX ADMIN — SODIUM BICARBONATE 650 MG TABLET 650 MG: at 08:28

## 2021-01-01 RX ADMIN — PIPERACILLIN SODIUM AND TAZOBACTAM SODIUM 3.38 G: 3; .375 INJECTION, POWDER, LYOPHILIZED, FOR SOLUTION INTRAVENOUS at 09:19

## 2021-01-01 RX ADMIN — HEPARIN SODIUM 5000 UNITS: 5000 INJECTION INTRAVENOUS; SUBCUTANEOUS at 21:55

## 2021-01-01 RX ADMIN — NOREPINEPHRINE BITARTRATE 4 MCG/MIN: 1 INJECTION, SOLUTION, CONCENTRATE INTRAVENOUS at 21:23

## 2021-01-01 RX ADMIN — INSULIN GLARGINE 12 UNITS: 100 INJECTION, SOLUTION SUBCUTANEOUS at 08:58

## 2021-01-01 RX ADMIN — Medication 1 CAPSULE: at 09:15

## 2021-04-09 PROBLEM — E11.9 DIABETES MELLITUS TYPE 2, CONTROLLED (HCC): Status: ACTIVE | Noted: 2021-01-01

## 2021-04-09 PROBLEM — J96.01 ACUTE HYPOXEMIC RESPIRATORY FAILURE (HCC): Status: ACTIVE | Noted: 2021-01-01

## 2021-04-09 PROBLEM — N18.9 CKD (CHRONIC KIDNEY DISEASE): Status: ACTIVE | Noted: 2021-01-01

## 2021-04-09 PROBLEM — I10 HYPERTENSION: Status: ACTIVE | Noted: 2021-01-01

## 2021-04-09 NOTE — ED NOTES
TRANSFER - OUT REPORT: 
 
Verbal report given to Paul Umana RN on [facility]  being transferred to 8th floor for routine progression of care Report consisted of patients Situation, Background, Assessment and  
Recommendations(SBAR). Information from the following report(s) SBAR was reviewed with the receiving nurse. Lines:  
Peripheral IV 04/09/21 Left Wrist (Active) Opportunity for questions and clarification was provided. Patient transported with: 
 [facility]

## 2021-04-09 NOTE — ED TRIAGE NOTES
Ambulatory to triage without difficulty. C/o of shob since last night along with sinus drainage and back pain. Denies known covid exposure. Denies hx of asthma, copd, or chf.  State she has not had a BM in 4 days and thinks it is making things worse.

## 2021-04-09 NOTE — ED PROVIDER NOTES
57-year-old -American female history of chronic renal failure but not yet on dialysis presents with cough, shortness of breath and constipation. She states symptoms have been worsening for the past week. Cough and shortness of breath aggravated by activity and lying down. No fever, vomiting or diarrhea. No abdominal pain. She has not tried any stool softeners or laxatives. Patient has been referred for vein mapping in preparation for dialysis but does not have an appointment until May 5. She denies chest pain. Shortness of Breath Associated symptoms include cough and leg swelling. Pertinent negatives include no fever, no headaches, no neck pain, no chest pain, no vomiting, no abdominal pain and no rash. Past Medical History:  
Diagnosis Date  Asthma  Diabetes ([facility] [address]   
 niddm  Hypertension No past surgical history on file. No family history on file. Social History Socioeconomic History  Marital status: SINGLE Spouse name: Not on file  Number of children: Not on file  Years of education: Not on file  Highest education level: Not on file Occupational History  Not on file Social Needs  Financial resource strain: Not on file  Food insecurity Worry: Not on file Inability: Not on file  Transportation needs Medical: Not on file Non-medical: Not on file Tobacco Use  Smoking status: Current Every Day Smoker Packs/day: 1.00 Years: 32.00 Pack years: 32.00 Substance and Sexual Activity  Alcohol use: No  
 Drug use: No  
 Sexual activity: Not on file Lifestyle  Physical activity Days per week: Not on file Minutes per session: Not on file  Stress: Not on file Relationships  Social connections Talks on phone: Not on file Gets together: Not on file Attends [employer] service: Not on file Active member of club or organization: Not on file   Attends meetings of clubs or organizations: Not on file Relationship status: Not on file  Intimate partner violence Fear of current or ex partner: Not on file Emotionally abused: Not on file Physically abused: Not on file Forced sexual activity: Not on file Other Topics Concern  Not on file Social History Narrative  Not on file ALLERGIES: Patient has no known allergies. Review of Systems Constitutional: Negative for fever. HENT: Negative for congestion. Respiratory: Positive for cough and shortness of breath. Cardiovascular: Positive for leg swelling. Negative for chest pain. Gastrointestinal: Positive for constipation. Negative for abdominal pain, diarrhea, nausea and vomiting. Genitourinary: Negative for dysuria. Musculoskeletal: Negative for back pain and neck pain. Skin: Negative for rash. Neurological: Negative for headaches. Vitals:  
 04/09/21 1054 04/09/21 1205 BP: (!) 180/100 (!) 190/105 Pulse: 98 98 Resp: 16 Temp: 98.5 °F (36.9 °C) SpO2: 96% Weight: 63.5 kg (140 lb) Height: 5' 3\" (1.6 m) Physical Exam 
Vitals signs and nursing note reviewed. Constitutional:   
   General: She is not in acute distress. Appearance: Normal appearance. She is not toxic-appearing. HENT:  
   Head: Normocephalic and atraumatic. Nose: Nose normal.  
   Mouth/Throat:  
   Mouth: Mucous membranes are moist.  
   Pharynx: Oropharynx is clear. Eyes:  
   Pupils: Pupils are equal, round, and reactive to light. Neck: Musculoskeletal: Normal range of motion and neck supple. Cardiovascular:  
   Rate and Rhythm: Normal rate and regular rhythm. Pulmonary:  
   Comments: Mildly tachypneic with diminished breath sounds in the bases and diffuse crackles. No wheezing. Abdominal:  
   General: There is no distension. Palpations: Abdomen is soft. Tenderness: There is no abdominal tenderness. There is no guarding.   
Musculoskeletal: Normal range of motion. Comments: Mild symmetric lower extremity edema Skin: 
   General: Skin is warm and dry. Neurological:  
   Mental Status: She is alert and oriented to person, place, and time. Psychiatric:     
   Mood and Affect: Mood normal.     
   Behavior: Behavior normal.  
 
  
 
MDM Number of Diagnoses or Management Options Diagnosis management comments: EKG shows normal sinus rhythm rate 97 nonspecific ST abnormality without diagnostic change compared to old EKG. Lab work shows stable anemia hemoglobin 8 hematocrit 24.3, chronic renal failure with creatinine 7.7 BUN 54, hyperglycemia 215 without DKA. LFTs unremarkable. BNP 48,000. Chest x-ray shows CHF/volume overload. Patient was treated with labetalol and Lasix. She had temporary improvement in blood pressure however has become more hypertensive and is receiving a second dose of labetalol. Patient also noted to have O2 saturation of 87% on room air. She was placed on 2 L with improvement to 97%. Patient will require admission for further diuresis and blood pressure control and possible initiation of hemodialysis. Hospitalist consulted for admission Critical care time 35 minutes management of hypertension, dyspnea, hypoxia with pulmonary edema. Amount and/or Complexity of Data Reviewed Clinical lab tests: ordered and reviewed Tests in the radiology section of CPT®: ordered and reviewed Tests in the medicine section of CPT®: ordered and reviewed Discuss the patient with other providers: yes Independent visualization of images, tracings, or specimens: yes Risk of Complications, Morbidity, and/or Mortality Presenting problems: high Diagnostic procedures: high Management options: high EKG Date/Time: 4/9/2021 1:09 PM 
Performed by: Kassandra Aldana MD 
Authorized by: Kassandra Aldana MD  
 
ECG reviewed by ED Physician in the absence of a cardiologist: yes Previous ECG:  
  Previous ECG: Compared to current Similarity:  No change Interpretation: Interpretation: abnormal   
Rate:  
  ECG rate:  97 ECG rate assessment: normal   
Rhythm:  
  Rhythm: sinus rhythm Ectopy:  
  Ectopy: none QRS:  
  QRS axis:  Normal 
Conduction:  
  Conduction: normal   
ST segments: ST segments:  Non-specific

## 2021-04-09 NOTE — CONSULTS
GEN GENERIC H&P/CONSULT Subjective:  
 
58 y/o AAF with a h/o HTN, DM, GERD and CKD stage 5, smoker. She presents today with a 3 day history of SOB, orthopnea and pedal edema. No CP , fever. + for productive white mucus cough. She was hospitalized last month at [facility] for syncopal episode likely related to hypoglycemia. She was found to have advance CKD stage 5 with creat in the 7's. She was scheduled by vascular surgery for AVF placement as out patient. Weight stable, appetite ok. No  or GI symptoms. Admitted today with volume overload with hypoxia O2 saturation 87% at rest on RA. She is being treated with IV Lasix. Renal consult requested for CKD stage 5 Ref. Range 4/9/2021 11:14 Sodium Latest Ref Range: 136 - 145 mmol/L 138 Potassium Latest Ref Range: 3.5 - 5.1 mmol/L 4.5 Chloride Latest Ref Range: 98 - 107 mmol/L 109 (H) CO2 Latest Ref Range: 21 - 32 mmol/L 20 (L) Anion gap Latest Ref Range: 7 - 16 mmol/L 9 Glucose Latest Ref Range: 65 - 100 mg/dL 215 (H) BUN Latest Ref Range: 8 - 23 MG/DL 54 (H) Creatinine Latest Ref Range: 0.6 - 1.0 MG/DL 7.70 (H) Calcium Latest Ref Range: 8.3 - 10.4 MG/DL 7.9 (L) Magnesium Latest Ref Range: 1.8 - 2.4 mg/dL 1.7 (L) GFR est non-AA Latest Ref Range: >60 ml/min/1.73m2 6 (L)  
GFR est AA Latest Ref Range: >60 ml/min/1.73m2 7 (L) Bilirubin, total Latest Ref Range: 0.2 - 1.1 MG/DL 0.5 Protein, total Latest Ref Range: 6.3 - 8.2 g/dL 7.8 Albumin Latest Ref Range: 3.2 - 4.6 g/dL 3.4 Globulin Latest Ref Range: 2.3 - 3.5 g/dL 4.4 (H) A-G Ratio Latest Ref Range: 1.2 - 3.5   0.8 (L) ALT Latest Ref Range: 12 - 65 U/L 20 AST Latest Ref Range: 15 - 37 U/L 27 Alk. phosphatase Latest Ref Range: 50 - 136 U/L 105 NT pro-BNP Latest Ref Range: 5 - 125 PG/ML 48,287 (H) Ref. Range 4/9/2021 11:14 WBC Latest Ref Range: 4.3 - 11.1 K/uL 7.3 RBC Latest Ref Range: 4.05 - 5.2 M/uL 2.49 (L) HGB Latest Ref Range: 11.7 - 15.4 g/dL 8.0 (L) HCT Latest Ref Range: 35.8 - 46.3 % 24.3 (L) MCV Latest Ref Range: 79.6 - 97.8 FL 97.6 MCH Latest Ref Range: 26.1 - 32.9 PG 32.1 MCHC Latest Ref Range: 31.4 - 35.0 g/dL 32.9 RDW Latest Ref Range: 11.9 - 14.6 % 13.7 PLATELET Latest Ref Range: 150 - 450 K/uL 145 (L) MPV Latest Ref Range: 9.4 - 12.3 FL 12.7 (H) NEUTROPHILS Latest Ref Range: 43 - 78 % 69 LYMPHOCYTES Latest Ref Range: 13 - 44 % 21 MONOCYTES Latest Ref Range: 4.0 - 12.0 % 8 EOSINOPHILS Latest Ref Range: 0.5 - 7.8 % 2  
BASOPHILS Latest Ref Range: 0.0 - 2.0 % 0 IMMATURE GRANULOCYTES Latest Ref Range: 0.0 - 5.0 % 0  
DF Latest Units:   AUTOMATED ABSOLUTE NRBC Latest Ref Range: 0.0 - 0.2 K/uL 0.00  
ABS. NEUTROPHILS Latest Ref Range: 1.7 - 8.2 K/UL 5.0  
ABS. IMM. GRANS. Latest Ref Range: 0.0 - 0.5 K/UL 0.0  
ABS. LYMPHOCYTES Latest Ref Range: 0.5 - 4.6 K/UL 1.5  
ABS. MONOCYTES Latest Ref Range: 0.1 - 1.3 K/UL 0.6  
ABS. EOSINOPHILS Latest Ref Range: 0.0 - 0.8 K/UL 0.1 ABS. BASOPHILS Latest Ref Range: 0.0 - 0.2 K/UL 0.0 Chest xray: CHF/volume overload. Past Medical History:  
Diagnosis Date  Asthma  Diabetes ([facility] [address]   
 niddm  Diabetes mellitus type 2, controlled ([facility] [address] 4/9/2021  Hypertension History reviewed. No pertinent surgical history. Prior to Admission medications Medication Sig Start Date End Date Taking? Authorizing Provider  
carvediloL (COREG) 25 mg tablet Take 25 mg by mouth two (2) times daily (with meals). Yes Provider, Historical  
sodium bicarbonate 650 mg tablet Take  by mouth three (3) times daily. Yes Provider, Historical  
 
No Known Allergies Social History Tobacco Use  Smoking status: Current Every Day Smoker Packs/day: 1.00 Years: 32.00 Pack years: 32.00 Substance Use Topics  Alcohol use: No  
  
Family History Problem Relation Age of Onset  No Known Problems Mother  No Known Problems Father Review of Systems Objective: Visit Vitals BP (!) 178/99 Comment: scheduled BP medication given Pulse 90 Temp 97.5 °F (36.4 °C) Resp 16 Ht 5' 3\" (1.6 m) Wt 63.5 kg (140 lb) SpO2 94% Breastfeeding No  
BMI 24.80 kg/m² No intake/output data recorded. No intake/output data recorded. PE: Not in LOWELL at rest  
On O2 nc 
Lung: decreased BS nato CV: RR, no rub Abd: soft, not tender Ext: trace edema No asterixis Data Review:  
 
Recent Results (from the past 24 hour(s)) EKG, 12 LEAD, INITIAL Collection Time: 04/09/21 11:01 AM  
Result Value Ref Range Ventricular Rate 97 BPM  
 Atrial Rate 97 BPM  
 P-R Interval 158 ms QRS Duration 72 ms Q-T Interval 370 ms QTC Calculation (Bezet) 469 ms Calculated P Axis 84 degrees Calculated R Axis 21 degrees Calculated T Axis 135 degrees Diagnosis Normal sinus rhythm Nonspecific T wave abnormality Abnormal ECG When compared with ECG of 25-MAR-2011 00:27, 
Nonspecific T wave abnormality now evident in Lateral leads CBC WITH AUTOMATED DIFF Collection Time: 04/09/21 11:14 AM  
Result Value Ref Range WBC 7.3 4.3 - 11.1 K/uL  
 RBC 2.49 (L) 4.05 - 5.2 M/uL HGB 8.0 (L) 11.7 - 15.4 g/dL HCT 24.3 (L) 35.8 - 46.3 % MCV 97.6 79.6 - 97.8 FL  
 MCH 32.1 26.1 - 32.9 PG  
 MCHC 32.9 31.4 - 35.0 g/dL  
 RDW 13.7 11.9 - 14.6 % PLATELET 354 (L) 703 - 450 K/uL MPV 12.7 (H) 9.4 - 12.3 FL ABSOLUTE NRBC 0.00 0.0 - 0.2 K/uL  
 DF AUTOMATED NEUTROPHILS 69 43 - 78 % LYMPHOCYTES 21 13 - 44 % MONOCYTES 8 4.0 - 12.0 % EOSINOPHILS 2 0.5 - 7.8 % BASOPHILS 0 0.0 - 2.0 % IMMATURE GRANULOCYTES 0 0.0 - 5.0 %  
 ABS. NEUTROPHILS 5.0 1.7 - 8.2 K/UL  
 ABS. LYMPHOCYTES 1.5 0.5 - 4.6 K/UL  
 ABS. MONOCYTES 0.6 0.1 - 1.3 K/UL  
 ABS. EOSINOPHILS 0.1 0.0 - 0.8 K/UL  
 ABS. BASOPHILS 0.0 0.0 - 0.2 K/UL  
 ABS. IMM. GRANS. 0.0 0.0 - 0.5 K/UL METABOLIC PANEL, COMPREHENSIVE Collection Time: 04/09/21 11:14 AM  
Result Value Ref Range Sodium 138 136 - 145 mmol/L Potassium 4.5 3.5 - 5.1 mmol/L Chloride 109 (H) 98 - 107 mmol/L  
 CO2 20 (L) 21 - 32 mmol/L Anion gap 9 7 - 16 mmol/L Glucose 215 (H) 65 - 100 mg/dL BUN 54 (H) 8 - 23 MG/DL Creatinine 7.70 (H) 0.6 - 1.0 MG/DL  
 GFR est AA 7 (L) >60 ml/min/1.73m2 GFR est non-AA 6 (L) >60 ml/min/1.73m2 Calcium 7.9 (L) 8.3 - 10.4 MG/DL Bilirubin, total 0.5 0.2 - 1.1 MG/DL  
 ALT (SGPT) 20 12 - 65 U/L  
 AST (SGOT) 27 15 - 37 U/L Alk. phosphatase 105 50 - 136 U/L Protein, total 7.8 6.3 - 8.2 g/dL Albumin 3.4 3.2 - 4.6 g/dL Globulin 4.4 (H) 2.3 - 3.5 g/dL A-G Ratio 0.8 (L) 1.2 - 3.5 MAGNESIUM Collection Time: 04/09/21 11:14 AM  
Result Value Ref Range Magnesium 1.7 (L) 1.8 - 2.4 mg/dL NT-PRO BNP Collection Time: 04/09/21 11:14 AM  
Result Value Ref Range NT pro-BNP 48,287 (H) 5 - 125 PG/ML Assessment:  
 
Principal Problem: 
  Acute hypoxemic respiratory failure ([facility] (4/9/2021) Active Problems: 
  CKD (chronic kidney disease) (4/9/2021) Hypertension (4/9/2021) Diabetes mellitus type 2, controlled ([facility] (4/9/2021) I/P:  
 
CKD stage 5 with fluid overload/CHF 
HTN: bp elevated Anemia IIDM Rec: 
Continue with IV Lasix Plan for dialysis on Monday after P. Cath. Placement. Get work up that was at at [facility]. Check UA, Phos, VitD25, PTHi, Iron study. Thanks Dr. Bucky Mcburney

## 2021-04-09 NOTE — PROGRESS NOTES
TRANSFER - IN REPORT: 
 
Verbal report received from MARICRUZ Mendes on Manasa Matthews  being received from ED for routine progression of care Report consisted of patients Situation, Background, Assessment and  
Recommendations(SBAR). Information from the following report(s) SBAR, Kardex, ED Summary, Procedure Summary, Intake/Output, MAR, Accordion, Recent Results and Med Rec Status was reviewed with the receiving nurse. Opportunity for questions and clarification was provided. Assessment completed upon patients arrival to unit and care assumed.

## 2021-04-09 NOTE — H&P
Hospitalist H&P Note Admit Date:  2021 11:16 AM  
Name:  Loyda Pena Age:  57 y.o. 
:  1960 MRN:  [id number] PCP:  GIANFRANCO Resendiz Treatment Team: Attending Provider: Lawrence Jacobson MD; Primary Nurse: Kelley Parra RN 
Presenting Complaint: Shortness of Breath Initial Admission Diagnosis: Acute hypoxemic respiratory failure ([facility] [J96.01] HPI/Subjective:  
Ms. Jossy Aldana is a nice 58 y/o AAF with a h/o HTN, DM, GERD and ESRD who presents today with a 3 day history of SOB, orthopnea and pedal edema. Was hospitalized last month at [facility] for syncopal episode likely related to hypoglycemia. She was seen by Nephrology and Vascular Surgery at that time and it was felt that HD would be indicated soon. She was ultimately discharged with outpatient follow up. She reports compliance with her BP medications. Three days ago she developed SOB and orthopnea. She's also noticed some swelling in her feet. No chest pain, palpitations, ROBB, N/V/D, abdominal pain, fevers, cough, sick contacts. On presentation to the ER her BP was 180/100. Labs notable for BNP 48K, Cr 7.7 (was around the same last month during her hospitalization). CXR with volume overload. O2 saturation was 87% at rest on RA, now improved with 2L NC O2. We are consulted for admission and further management. 10 systems reviewed and negative except as noted in HPI. Assessment and Plan:  
 
Hospital Problems as of 2021 Date Reviewed: 2021 Codes Class Noted - Resolved POA * (Principal) Acute hypoxemic respiratory failure (HCC) ICD-10-CM: J96.01 
ICD-9-CM: 518.81  2021 - Present Unknown  
   
 CKD (chronic kidney disease) ICD-10-CM: N18.9 ICD-9-CM: 585.9  2021 - Present Unknown Hypertension ICD-10-CM: I10 
ICD-9-CM: 401.9  2021 - Present Unknown Diabetes mellitus type 2, controlled ([facility] ICD-10-CM: E11.9 ICD-9-CM: 250.00  2021 - Present Unknown Plan: # Acute hypoxemic respiratory failure - Secondary to acute volume overload due to progression of underling CKD/ESRD. She does make urine at baseline. BP control, try IV Lasix, follow Is/Os, Nephrology consult. # ESRD 
 - Had plans for HD access placement after discharge last month, did see vascular but has not had any procedures planned yet. Nephrology consult. # Acute volume overload - 2/2 kidney disease as noted above. - TTE 3/14/21 -- LVEF 45-50%, dilated LV, grade 2 DD # Chronic s/dCHF 
 - On Coreg. IV Lasix for now. # Hypertensive urgency - Resume home BP meds today, titrate as needed. # Normocytic anemia - 2/2 CKD, no bleeding. Other listed chronic conditions stable; continue essential home meds. Discharge planning: Pending. DVT ppx ordered: Heparin SQ Code status:  Full code. Estimated LOS:  Greater than 2 midnights Risk:  high Past Medical History:  
Diagnosis Date  Asthma  Diabetes ([facility]   
 niddm  Diabetes mellitus type 2, controlled ([facility] 4/9/2021  Hypertension History reviewed. No pertinent surgical history. No Known Allergies Social History Tobacco Use  Smoking status: Current Every Day Smoker Packs/day: 1.00 Years: 32.00 Pack years: 32.00 Substance Use Topics  Alcohol use: No  
  
Family History Problem Relation Age of Onset  No Known Problems Mother  No Known Problems Father Family history reviewed and noncontributory to patient's acute condition; no relevant family history unless otherwise noted above. Immunization History Administered Date(s) Administered  TD Vaccine 09/16/2008 PTA Medications: 
Current Outpatient Medications Medication Instructions  atorvastatin (LIPITOR) 40 mg, Oral, DAILY  carvediloL (COREG) 25 mg, Oral, 2 TIMES DAILY WITH MEALS  famotidine (PEPCID) 40 mg, Oral, DAILY  NIFEdipine ER (PROCARDIA XL) 60 mg, Oral, DAILY  sodium bicarbonate 650 mg tablet Oral, 3 TIMES DAILY Objective:  
 
Patient Vitals for the past 24 hrs: 
 Temp Pulse Resp BP SpO2  
04/09/21 1300  85  (!) 209/99 97 % 04/09/21 1251  80  (!) 176/90 93 % 04/09/21 1231  82  (!) 199/100 (!) 83 % 04/09/21 1205  98  (!) 190/105   
04/09/21 1200  91  (!) 190/105 98 % 04/09/21 1152  91  (!) 199/95 92 % 04/09/21 1054 98.5 °F (36.9 °C) 98 16 (!) 180/100 96 % Oxygen Therapy O2 Sat (%): 97 % (04/09/21 1300) Pulse via Oximetry: 85 beats per minute (04/09/21 1300) O2 Device: Room air (04/09/21 1054) Estimated body mass index is 24.8 kg/m² as calculated from the following: 
  Height as of this encounter: 5' 3\" (1.6 m). Weight as of this encounter: 63.5 kg (140 lb). No intake or output data in the 24 hours ending 04/09/21 1422 *Note that automatically entered I/Os may not be accurate; dependent on patient compliance with collection and accurate  by techs. Physical Exam: 
General:    Well nourished. No overt distress. Eyes:   Normal sclerae. Extraocular movements intact. HENT:  Normocephalic, atraumatic. Moist mucous membranes CV:   RRR. No m/r/g. Lungs:  Bibasilar rales, stable and comfortable on 2L NC O2 with normal bedside O2 saturation. Abdomen: Soft, nontender, nondistended. Extremities: Warm and dry. No cyanosis or clubbing. Trace ankle/pedal edema. Neurologic: CN II-XII grossly intact. Sensation intact. Skin:     No rashes. No jaundice. Normal coloration Psych:  Normal mood and affect. I reviewed the labs, imaging, EKGs, telemetry, and other studies done this admission. Data Reviewed:  
Recent Results (from the past 24 hour(s)) CBC WITH AUTOMATED DIFF Collection Time: 04/09/21 11:14 AM  
Result Value Ref Range WBC 7.3 4.3 - 11.1 K/uL  
 RBC 2.49 (L) 4.05 - 5.2 M/uL HGB 8.0 (L) 11.7 - 15.4 g/dL HCT 24.3 (L) 35.8 - 46.3 %  MCV 97.6 79.6 - 97.8 FL  
 MCH 32.1 26.1 - 32.9 PG  
 MCHC 32.9 31.4 - 35.0 g/dL  
 RDW 13.7 11.9 - 14.6 % PLATELET 476 (L) 658 - 450 K/uL MPV 12.7 (H) 9.4 - 12.3 FL ABSOLUTE NRBC 0.00 0.0 - 0.2 K/uL  
 DF AUTOMATED NEUTROPHILS 69 43 - 78 % LYMPHOCYTES 21 13 - 44 % MONOCYTES 8 4.0 - 12.0 % EOSINOPHILS 2 0.5 - 7.8 % BASOPHILS 0 0.0 - 2.0 % IMMATURE GRANULOCYTES 0 0.0 - 5.0 %  
 ABS. NEUTROPHILS 5.0 1.7 - 8.2 K/UL  
 ABS. LYMPHOCYTES 1.5 0.5 - 4.6 K/UL  
 ABS. MONOCYTES 0.6 0.1 - 1.3 K/UL  
 ABS. EOSINOPHILS 0.1 0.0 - 0.8 K/UL  
 ABS. BASOPHILS 0.0 0.0 - 0.2 K/UL  
 ABS. IMM. GRANS. 0.0 0.0 - 0.5 K/UL METABOLIC PANEL, COMPREHENSIVE Collection Time: 04/09/21 11:14 AM  
Result Value Ref Range Sodium 138 136 - 145 mmol/L Potassium 4.5 3.5 - 5.1 mmol/L Chloride 109 (H) 98 - 107 mmol/L  
 CO2 20 (L) 21 - 32 mmol/L Anion gap 9 7 - 16 mmol/L Glucose 215 (H) 65 - 100 mg/dL BUN 54 (H) 8 - 23 MG/DL Creatinine 7.70 (H) 0.6 - 1.0 MG/DL  
 GFR est AA 7 (L) >60 ml/min/1.73m2 GFR est non-AA 6 (L) >60 ml/min/1.73m2 Calcium 7.9 (L) 8.3 - 10.4 MG/DL Bilirubin, total 0.5 0.2 - 1.1 MG/DL  
 ALT (SGPT) 20 12 - 65 U/L  
 AST (SGOT) 27 15 - 37 U/L Alk. phosphatase 105 50 - 136 U/L Protein, total 7.8 6.3 - 8.2 g/dL Albumin 3.4 3.2 - 4.6 g/dL Globulin 4.4 (H) 2.3 - 3.5 g/dL A-G Ratio 0.8 (L) 1.2 - 3.5 MAGNESIUM Collection Time: 04/09/21 11:14 AM  
Result Value Ref Range Magnesium 1.7 (L) 1.8 - 2.4 mg/dL NT-PRO BNP Collection Time: 04/09/21 11:14 AM  
Result Value Ref Range NT pro-BNP 48,287 (H) 5 - 125 PG/ML All Micro Results None Medications Administered   
 furosemide (LASIX) injection 40 mg   
 Admin Date 04/09/2021 Action Given Dose 40 mg Route IntraVENous Administered By Micah Carlson RN  
  
  
 labetaloL (NORMODYNE;TRANDATE) injection 20 mg   
 Admin Date 04/09/2021 Action Given Dose 
20 mg Route IntraVENous Administered By Lolis Rios RN Admin Date 04/09/2021 Action Given Dose 
20 mg Route IntraVENous Administered By Jacki Dunn RN Other Studies: No results found for this visit on 04/09/21. Xr Chest [facility] Result Date: 4/9/2021 Exam: XR CHEST PORT on 4/9/2021 11:34 AM Clinical History: The Female patient is 57years old  presenting for Syncope. Comparison:  none Findings:  Frontal view of the chest was obtained. There is perihilar and bibasilar pulmonary vascular congestion. Small basilar effusions are demonstrated. The cardiomediastinal silhouette is enlarged. There are no acute osseous abnormalities. 1. CHF/volume overload. CPT code(s) 31529 SARS-CoV-2 Lab Results \"Novel Coronavirus\" Test: No results found for: COV2NT \"Emergent Disease\" Test: No results found for: EDPR \"SARS-COV-2\" Test: No results found for: XGCOVT Rapid Test: No results found for: COVR Signed: 
Jerome Garcia MD

## 2021-04-09 NOTE — PROGRESS NOTES
Initial visit in the ER, a spiritual presence, emotional presence and prayer were provided for the patient. Betty Cruz, [address] [facility], [facility]

## 2021-04-09 NOTE — PROGRESS NOTES
TRANSFER - IN REPORT: 
 
Verbal report received from Tobi Gupta RN on [place]  being received from ER for routine progression of care Report consisted of patients Situation, Background, Assessment and  
Recommendations(SBAR). Information from the following report(s) ED Summary was reviewed with the receiving nurse. Opportunity for questions and clarification was provided. Assessment completed upon patients arrival to unit and care assumed.

## 2021-04-10 NOTE — PROGRESS NOTES
SBAR from Mikki Bolk, [place]. Patient in stable condition with resps even/unlabored. NAD noted. Patient on room air. Safety measures noted. Will continue to monitor per policy.

## 2021-04-10 NOTE — PROGRESS NOTES
Hospitalist Progress Note Subjective:  
Daily Progress Note: 4/10/2021 1134 Patient presented to ER 4/9 with 72 hour history of progressively worsening SOB, orthopnea, LE edema, and back pain. No known pulmonary history. Constipated with no BM x 4 days. ESRD with referral 5/5/21 for vein mapping for HD. Hypertensive to 180/100 on arrival.  CXR with volume overload. Given labetalol and lasix. Oxygen sat on arrival 87% on room air, 2 liters oxygen with improvement to 97%. Nephrology in for consult. Creatinine: 7.7, not much change from last month. Hospitalized last month at [facility] for hypoglycemia. 4/10:  Reports feeling better this am,  Voiding frequently however output has not been measured. Oxygen sat stable on room air. Denies pain or need. Nephrology with plans as above. Assessment/Plan:  
ACUTE HYPOXEMIC RESPIRATORY FAILURE:  DUE TO VOLUME OVERLOAD SECONDARY TO ESRD/CKD V. Continue 60 mg IV lasix bid, strict I+O-morin if needed Nephrology on board Blood pressure control Continue oxygen, titrate as needed: 4/10:  Stable on room air ACUTE VOLUME OVERLOAD:  BNP ON ADMISSION:  48,000 TTE 3/14/21:  EF 45-50%, dilated LV, Grade 2 DD Did not order another echocardiogram  
 
ESRD:  HAD PLANS FOR HD ACCESS PLACEMENT 5/5/21 Continue lasix, bicarb Nephrology on board, appreciate Nephrology with plans for HD 4/12 after HD placement HYPERTENSION WITH HYPERTENSIVE URGENCY:   
 Continue home meds NIDDM II:  A1C 7.0 Has not been taking meds in a few weeks Routine SSI ac tid and HS 
 
CHRONIC SYSTOLIC AND DIASTOLIC HEART FAILURE:   
 Continue coreg and lasix [address] HISTORY:  Nicotine patch prn Encourage cessation ANEMIA OF CHRONIC DISEASE:  HGB ADM: 8.0.  DUE TO RENAL DISEASE No current or past bleeding Follow Current Facility-Administered Medications Medication Dose Route Frequency  atorvastatin (LIPITOR) tablet 40 mg  40 mg Oral DAILY  carvediloL (COREG) tablet 25 mg  25 mg Oral BID WITH MEALS  famotidine (PEPCID) tablet 40 mg  40 mg Oral DAILY  NIFEdipine ER (PROCARDIA XL) tablet 60 mg  60 mg Oral DAILY  sodium bicarbonate tablet 650 mg  650 mg Oral TID  sodium chloride (NS) flush 5-40 mL  5-40 mL IntraVENous Q8H  
 sodium chloride (NS) flush 5-40 mL  5-40 mL IntraVENous PRN  
 acetaminophen (TYLENOL) tablet 650 mg  650 mg Oral Q6H PRN Or  
 acetaminophen (TYLENOL) suppository 650 mg  650 mg Rectal Q6H PRN  polyethylene glycol (MIRALAX) packet 17 g  17 g Oral DAILY PRN  promethazine (PHENERGAN) tablet 12.5 mg  12.5 mg Oral Q6H PRN Or  
 ondansetron (ZOFRAN) injection 4 mg  4 mg IntraVENous Q6H PRN  
 heparin (porcine) injection 5,000 Units  5,000 Units SubCUTAneous Q8H  
 furosemide (LASIX) injection 60 mg  60 mg IntraVENous BID Review of Systems A comprehensive review of systems was negative except for that written in the HPI. Objective:  
 
Visit Vitals BP (!) 148/69 (BP 1 Location: Right arm, BP Patient Position: At rest) Pulse 87 Temp 98.1 °F (36.7 °C) Resp 16 Ht 5' 3\" (1.6 m) Wt 63.5 kg (140 lb) SpO2 98% Breastfeeding No  
BMI 24.80 kg/m² O2 Device: None (Room air) Temp (24hrs), Av.9 °F (36.6 °C), Min:97.5 °F (36.4 °C), Max:98.5 °F (36.9 °C) 
 
 1901 - 04/10 0700 In: 240 [P.O.:240] Out: - General appearance: Oriented and alert, cooperative, speech difficult to understand due to no teeth. Well nourished, well hydrated. Head: Normocephalic, without obvious abnormality, atraumatic Throat: Lips, mucosa, and tongue normal. Teeth and gums normal 
Neck: supple, symmetrical, trachea midline, and no JVD Lungs: Diminished in bases, otherwise clear to auscultation bilaterally Heart: Loud heart sounds regular rate and rhythm, S1, S2 normal, no murmur, click, rub or gallop Abdomen: soft, non-tender. Bowel sounds normal. No masses,  no organomegaly Extremities: All extremities normal, atraumatic, no cyanosis. No LE edema this am  
Skin: Skin color, texture, turgor normal. No rashes or lesions Neurologic: Grossly normal 
 
Additional comments: Notes,orders, test results, vitals reviewed Data Review Recent Results (from the past 24 hour(s)) EKG, 12 LEAD, INITIAL Collection Time: 04/09/21 11:01 AM  
Result Value Ref Range Ventricular Rate 97 BPM  
 Atrial Rate 97 BPM  
 P-R Interval 158 ms QRS Duration 72 ms Q-T Interval 370 ms QTC Calculation (Bezet) 469 ms Calculated P Axis 84 degrees Calculated R Axis 21 degrees Calculated T Axis 135 degrees Diagnosis Normal sinus rhythm Nonspecific T wave abnormality Abnormal ECG When compared with ECG of 25-MAR-2011 00:27, 
Nonspecific T wave abnormality now evident in Lateral leads Confirmed by Landy Lynch MD (), JACOBY NUNES ([id number]) on 4/9/2021 5:38:44 PM 
  
CBC WITH AUTOMATED DIFF Collection Time: 04/09/21 11:14 AM  
Result Value Ref Range WBC 7.3 4.3 - 11.1 K/uL  
 RBC 2.49 (L) 4.05 - 5.2 M/uL HGB 8.0 (L) 11.7 - 15.4 g/dL HCT 24.3 (L) 35.8 - 46.3 % MCV 97.6 79.6 - 97.8 FL  
 MCH 32.1 26.1 - 32.9 PG  
 MCHC 32.9 31.4 - 35.0 g/dL  
 RDW 13.7 11.9 - 14.6 % PLATELET 284 (L) 164 - 450 K/uL MPV 12.7 (H) 9.4 - 12.3 FL ABSOLUTE NRBC 0.00 0.0 - 0.2 K/uL  
 DF AUTOMATED NEUTROPHILS 69 43 - 78 % LYMPHOCYTES 21 13 - 44 % MONOCYTES 8 4.0 - 12.0 % EOSINOPHILS 2 0.5 - 7.8 % BASOPHILS 0 0.0 - 2.0 % IMMATURE GRANULOCYTES 0 0.0 - 5.0 %  
 ABS. NEUTROPHILS 5.0 1.7 - 8.2 K/UL  
 ABS. LYMPHOCYTES 1.5 0.5 - 4.6 K/UL  
 ABS. MONOCYTES 0.6 0.1 - 1.3 K/UL  
 ABS. EOSINOPHILS 0.1 0.0 - 0.8 K/UL  
 ABS. BASOPHILS 0.0 0.0 - 0.2 K/UL  
 ABS. IMM. GRANS. 0.0 0.0 - 0.5 K/UL METABOLIC PANEL, COMPREHENSIVE Collection Time: 04/09/21 11:14 AM  
Result Value Ref Range Sodium 138 136 - 145 mmol/L Potassium 4.5 3.5 - 5.1 mmol/L  Chloride 109 (H) 98 - 107 mmol/L  
 CO2 20 (L) 21 - 32 mmol/L Anion gap 9 7 - 16 mmol/L Glucose 215 (H) 65 - 100 mg/dL BUN 54 (H) 8 - 23 MG/DL Creatinine 7.70 (H) 0.6 - 1.0 MG/DL  
 GFR est AA 7 (L) >60 ml/min/1.73m2 GFR est non-AA 6 (L) >60 ml/min/1.73m2 Calcium 7.9 (L) 8.3 - 10.4 MG/DL Bilirubin, total 0.5 0.2 - 1.1 MG/DL  
 ALT (SGPT) 20 12 - 65 U/L  
 AST (SGOT) 27 15 - 37 U/L Alk. phosphatase 105 50 - 136 U/L Protein, total 7.8 6.3 - 8.2 g/dL Albumin 3.4 3.2 - 4.6 g/dL Globulin 4.4 (H) 2.3 - 3.5 g/dL A-G Ratio 0.8 (L) 1.2 - 3.5 MAGNESIUM Collection Time: 04/09/21 11:14 AM  
Result Value Ref Range Magnesium 1.7 (L) 1.8 - 2.4 mg/dL NT-PRO BNP Collection Time: 04/09/21 11:14 AM  
Result Value Ref Range NT pro-BNP 48,287 (H) 5 - 125 PG/ML  
 
 4/9:  CXR:  CHF/volume overload. Care Plan discussed with: Patient and Nurse Signed By: Jil Galindo NP April 10, 2021

## 2021-04-10 NOTE — PROGRESS NOTES
Patient resting in bed, alert and oriented, cooperative with care. Patient on 4 liters of Oxygen via NC. Patient denies pain or distress, safety measures in place, call light within reach.

## 2021-04-10 NOTE — PROGRESS NOTES
Patient in bed watching TV. No s/s of distress. No needs expressed at this time. patient denies any pain. Will continue to monitor.

## 2021-04-10 NOTE — PROGRESS NOTES
Admit Date: 4/9/2021 Subjective:  
 
 56 y/o AAF with a h/o HTN, DM, GERD and CKD stage 5, smoker. She presents today with a 3 day history of SOB, orthopnea and pedal edema. No CP , fever. + for productive white mucus cough. She was hospitalized last month at [facility] for syncopal episode likely related to hypoglycemia. She was found to have advance CKD stage 5 with creat in the 7's. She was scheduled by vascular surgery for AVF placement as out patient. Weight stable, appetite ok. No  or GI symptoms. Admitted today with volume overload with hypoxia O2 saturation 87% at rest on RA. She is being treated with IV Lasix. Renal consult requested for CKD stage 5 Review of Systems Cardio-vascular: no chest pain,  SOB better GI: no N/V/D 
: no dysuria, no hematuria Objective:  
 
Patient Vitals for the past 8 hrs: 
 BP Temp Pulse Resp SpO2  
04/10/21 0833   91    
04/10/21 0803 (!) 148/70 98.4 °F (36.9 °C) 93 16 97 % 04/10/21 0800   (!) 51    
04/10/21 0259 (!) 148/69 98.1 °F (36.7 °C) 87 16 98 % 04/10 0701 - 04/10 1900 In: 240 [P.O.:240] Out: -  
 
 
  
PE: 
Not in LOWELL at rest  
On O2 nc 
Lung: decreased BS nato CV: RR, no rub Abd: soft, not tender Ext: trace edema No asterixis Data Review Recent Results (from the past 8 hour(s)) VITAMIN D, 25 HYDROXY Collection Time: 04/10/21  7:37 AM  
Result Value Ref Range Vitamin D 25-Hydroxy 26.6 (L) 30.0 - 100.0 ng/mL PTH INTACT Collection Time: 04/10/21  7:37 AM  
Result Value Ref Range Calcium 7.5 (L) 8.3 - 10.4 MG/DL  
 PTH, Intact 711.5 (H) 18.5 - 88.0 pg/mL METABOLIC PANEL, BASIC Collection Time: 04/10/21  7:37 AM  
Result Value Ref Range Sodium 139 136 - 145 mmol/L Potassium 4.3 3.5 - 5.1 mmol/L Chloride 109 (H) 98 - 107 mmol/L  
 CO2 21 21 - 32 mmol/L Anion gap 9 7 - 16 mmol/L Glucose 172 (H) 65 - 100 mg/dL BUN 58 (H) 8 - 23 MG/DL  Creatinine 7.86 (H) 0.6 - 1.0 MG/DL  
 GFR est AA 7 (L) >60 ml/min/1.73m2 GFR est non-AA 6 (L) >60 ml/min/1.73m2 Calcium 7.5 (L) 8.3 - 10.4 MG/DL  
CBC WITH AUTOMATED DIFF Collection Time: 04/10/21  7:37 AM  
Result Value Ref Range WBC 7.4 4.3 - 11.1 K/uL  
 RBC 2.28 (L) 4.05 - 5.2 M/uL HGB 7.1 (L) 11.7 - 15.4 g/dL HCT 22.2 (L) 35.8 - 46.3 % MCV 97.4 79.6 - 97.8 FL  
 MCH 31.1 26.1 - 32.9 PG  
 MCHC 32.0 31.4 - 35.0 g/dL  
 RDW 13.4 11.9 - 14.6 % PLATELET 641 (L) 326 - 450 K/uL MPV 12.9 (H) 9.4 - 12.3 FL ABSOLUTE NRBC 0.00 0.0 - 0.2 K/uL  
 DF AUTOMATED NEUTROPHILS 69 43 - 78 % LYMPHOCYTES 20 13 - 44 % MONOCYTES 9 4.0 - 12.0 % EOSINOPHILS 2 0.5 - 7.8 % BASOPHILS 0 0.0 - 2.0 % IMMATURE GRANULOCYTES 0 0.0 - 5.0 %  
 ABS. NEUTROPHILS 5.1 1.7 - 8.2 K/UL  
 ABS. LYMPHOCYTES 1.5 0.5 - 4.6 K/UL  
 ABS. MONOCYTES 0.7 0.1 - 1.3 K/UL  
 ABS. EOSINOPHILS 0.1 0.0 - 0.8 K/UL  
 ABS. BASOPHILS 0.0 0.0 - 0.2 K/UL  
 ABS. IMM. GRANS. 0.0 0.0 - 0.5 K/UL Assessment:  
 
Principal Problem: 
  Acute hypoxemic respiratory failure ([facility] (4/9/2021) Active Problems: 
  CKD (chronic kidney disease) (4/9/2021) Hypertension (4/9/2021) Diabetes mellitus type 2, controlled ([facility] (4/9/2021) Plan:  
 
CKD stage 5 with fluid overload/CHF 
HTN: bp elevated Anemia IIDM 
  
Rec: 
Continue with IV Lasix Plan for dialysis on Monday after P. Cath. Placement. Get work up that was at at [facility]. Check UA, Phos, VitD25, PTHi, Iron study.  
 
Rocky Aguilar MD

## 2021-04-11 NOTE — PROGRESS NOTES
Lab called to report patient's  Hgb was 6.8. This RN notified . Dr. Daniel Moran via perfect serve. Dr. Steven Winslow gave verbal orders to hold 0600 Heparin and is placing transfusion orders.

## 2021-04-11 NOTE — PROGRESS NOTES
Admit Date: 4/9/2021 Subjective:  
 
 58 y/o AAF with a h/o HTN, DM, GERD and CKD stage 5, smoker. She presents today with a 3 day history of SOB, orthopnea and pedal edema. No CP , fever. + for productive white mucus cough. She was hospitalized last month at [address] [facility] for syncopal episode likely related to hypoglycemia. She was found to have advance CKD stage 5 with creat in the 7's. She was scheduled by vascular surgery for AVF placement as out patient. Weight stable, appetite ok. No  or GI symptoms. Admitted today with volume overload with hypoxia O2 saturation 87% at rest on RA. She is being treated with IV Lasix. Renal consult requested for CKD stage 5 Review of Systems Cardio-vascular: no chest pain,  SOB better GI: no N/V/D 
: no dysuria, no hematuria Objective:  
 
Patient Vitals for the past 8 hrs: 
 BP Temp Pulse Resp SpO2  
04/11/21 1343 127/75 98.5 °F (36.9 °C) 78 16 100 % 04/11/21 1310 (!) 148/72 98.3 °F (36.8 °C) 77 16 100 % 04/11/21 1211 (!) 141/82 98.7 °F (37.1 °C) 75 16 100 % 04/11/21 1106 (!) 147/73 98.8 °F (37.1 °C) 80 16 100 % 04/11/21 1048 139/76 98.7 °F (37.1 °C) 80 16 100 % 04/11/21 0713 (!) 142/77 98.9 °F (37.2 °C) 85 16 100 % 04/11 0701 - 04/11 1900 In: 820.4 [P.O.:480] Out: 200 [Urine:200] 
 
 
  
PE: 
Not in LOWELL at rest  
On O2 nc 
Lung: decreased BS nato CV: RR, no rub Abd: soft, not tender Ext: trace edema No asterixis Data Review Recent Results (from the past 8 hour(s)) RBC, ALLOCATE Collection Time: 04/11/21  6:15 AM  
Result Value Ref Range HISTORY CHECKED? Historical check performed TYPE & SCREEN Collection Time: 04/11/21  6:21 AM  
Result Value Ref Range Crossmatch Expiration 04/14/2021,2854 ABO/Rh(D) A POSITIVE Antibody screen NEG Unit number [id number] Blood component type  LRIR Unit division 00 Status of unit ISSUED Crossmatch result Compatible GLUCOSE, POC  Collection Time: 04/11/21 10:47 AM  
Result Value Ref Range Glucose (POC) 208 (H) 65 - 100 mg/dL Performed by Tim) Assessment:  
 
Principal Problem: 
  Acute hypoxemic respiratory failure ([facility] (4/9/2021) Active Problems: 
  CKD (chronic kidney disease) (4/9/2021) Hypertension (4/9/2021) Diabetes mellitus type 2, controlled ([facility] (4/9/2021) Plan:  
 
CKD stage 5 with fluid overload/CHF 
HTN: bp elevated Anemia: Hb dropped , getting rbc tranfusion IIDM 
  
Rec: 
Continue with IV Lasix Plan for dialysis on Monday after P. Cath. Placement. Pt is interested to do PD. Will discuss further Get work up that was at at [facility]. Check UA, Phos, VitD25, PTHi, Iron study.  
 
Monica Hernandez MD

## 2021-04-11 NOTE — PROGRESS NOTES
Hospitalist Progress Note Subjective:  
Daily Progress Note: 4/11/2021 8:32 AM 
 
Patient presented to ER 4/9 with 72 hour history of progressively worsening SOB, orthopnea, LE edema, and back pain. No known pulmonary history. Constipated with no BM x 4 days. ESRD with referral 5/5/21 for vein mapping for HD. Hypertensive to 180/100 on arrival.  CXR with volume overload. Given labetalol and lasix. Oxygen sat on arrival 87% on room air, 2 liters oxygen with improvement to 97%. Nephrology in for consult. Creatinine: 7.7, not much change from last month. Hospitalized last month at [facility] for hypoglycemia.    
4/10:  Reports feeling better this am,  Voiding frequently however output has not been measured. Oxygen sat stable on room air. Denies pain or need. Nephrology with plans as above. 4/11:  24 OP only 750 with intake of 1010. Blood pressure improved. Hgb 6.8 this am.  Pending transfusion. Up to bathroom, instructed to leave urine in hat. Urine with 5-10 WBC, culture pending. Continued oxygen at 4 liters with sats %. States is feeling better. Plans for AV access tomorrow with HD to follow. Assessment/Plan:  
ACUTE HYPOXEMIC RESPIRATORY FAILURE:  DUE TO VOLUME OVERLOAD SECONDARY TO ESRD/CKD V. Continue 60 mg IV lasix bid, strict I+O-morin if needed Nephrology on board Blood pressure control Continue oxygen, titrate as needed: 4/10:  Stable on room air 
  
ACUTE VOLUME OVERLOAD:  BNP ON ADMISSION:  48,000 TTE 3/14/21:  EF 45-50%, dilated LV, Grade 2 DD Did not order another echocardiogram  
  
ESRD:  HAD PLANS FOR HD ACCESS PLACEMENT 5/5/21 Continue lasix, bicarb Nephrology on board, appreciate Nephrology with plans for HD 4/12 after HD placement 
  
HYPERTENSION WITH HYPERTENSIVE URGENCY:   
            Continue home meds 
  
NIDDM II:  A1C 7.0             Has not been taking meds in a few weeks: On po at home Routine SSI ac tid and HS 
  
CHRONIC SYSTOLIC AND DIASTOLIC HEART FAILURE:   
            Continue coreg and lasix 
  
32 PACK YEAR SMOKING HISTORY:  Nicotine patch prn Encourage cessation 
  
ANEMIA OF CHRONIC DISEASE:  HGB ADM: 8.0.  DUE TO RENAL DISEASE No current or past bleeding Follow Current Facility-Administered Medications Medication Dose Route Frequency  HYDROcodone-acetaminophen (NORCO) 5-325 mg per tablet 1 Tab  1 Tab Oral Q4H PRN  
 0.9% sodium chloride infusion 250 mL  250 mL IntraVENous PRN  
 insulin lispro (HUMALOG) injection   SubCUTAneous AC&HS  nicotine (NICODERM CQ) 21 mg/24 hr patch 1 Patch  1 Patch TransDERmal Q24H  
 atorvastatin (LIPITOR) tablet 40 mg  40 mg Oral DAILY  carvediloL (COREG) tablet 25 mg  25 mg Oral BID WITH MEALS  famotidine (PEPCID) tablet 40 mg  40 mg Oral DAILY  NIFEdipine ER (PROCARDIA XL) tablet 60 mg  60 mg Oral DAILY  sodium bicarbonate tablet 650 mg  650 mg Oral TID  sodium chloride (NS) flush 5-40 mL  5-40 mL IntraVENous Q8H  
 sodium chloride (NS) flush 5-40 mL  5-40 mL IntraVENous PRN  
 acetaminophen (TYLENOL) tablet 650 mg  650 mg Oral Q6H PRN Or  
 acetaminophen (TYLENOL) suppository 650 mg  650 mg Rectal Q6H PRN  polyethylene glycol (MIRALAX) packet 17 g  17 g Oral DAILY PRN  promethazine (PHENERGAN) tablet 12.5 mg  12.5 mg Oral Q6H PRN Or  
 ondansetron (ZOFRAN) injection 4 mg  4 mg IntraVENous Q6H PRN  
 heparin (porcine) injection 5,000 Units  5,000 Units SubCUTAneous Q8H  
 furosemide (LASIX) injection 60 mg  60 mg IntraVENous BID Review of Systems A comprehensive review of systems was negative except for that written in the HPI. Objective:  
 
Visit Vitals BP (!) 142/77 Pulse 85 Temp 98.9 °F (37.2 °C) Resp 16 Ht 5' 3\" (1.6 m) Wt 65 kg (143 lb 4.8 oz) SpO2 100% Breastfeeding No  
BMI 25.38 kg/m² O2 Flow Rate (L/min): 4 l/min O2 Device: Nasal cannula Temp (24hrs), Av.4 °F (36.9 °C), Min:98 °F (36.7 °C), Max:98.9 °F (37.2 °C) 
 
 190 -  0700 In: 1250 [P.O.:1200; I.V.:50] Out: 750 [Urine:750] General appearance: Oriented and alert, cooperative, speech difficult to understand due to no teeth. Well nourished, well hydrated. Head: Normocephalic, without obvious abnormality, atraumatic Throat: Lips, mucosa, and tongue normal. Teeth and gums normal 
Neck: supple, symmetrical, trachea midline, and no JVD Lungs: Diminished in bases, otherwise clear to auscultation bilaterally Heart: Loud heart sounds regular rate and rhythm, S1, S2 normal, no murmur, click, rub or gallop Abdomen: soft, non-tender. Bowel sounds normal. No masses,  no organomegaly Extremities: All extremities normal, atraumatic, no cyanosis. No LE edema this am  
Skin: Skin color, texture, turgor normal. No rashes or lesions Neurologic: Grossly normal 
  
Additional comments: Notes,orders, test results, vitals reviewed 
  
Data Review Recent Results (from the past 24 hour(s)) GLUCOSE, POC Collection Time: 04/10/21  4:39 PM  
Result Value Ref Range Glucose (POC) 314 (H) 65 - 100 mg/dL Performed by Lex Hawthorne URINALYSIS W/ RFLX MICROSCOPIC Collection Time: 04/10/21  7:57 PM  
Result Value Ref Range Color YELLOW Appearance CLEAR Specific gravity 1.012 1.001 - 1.023    
 pH (UA) 5.5 5.0 - 9.0 Protein 300 (A) NEG mg/dL Glucose 250 mg/dL Ketone Negative NEG mg/dL Bilirubin Negative NEG Blood SMALL (A) NEG Urobilinogen 1.0 0.2 - 1.0 EU/dL Nitrites Negative NEG Leukocyte Esterase Negative NEG    
 WBC 5-10 0 /hpf  
 RBC 0-3 0 /hpf Epithelial cells 0-3 0 /hpf Bacteria 0 0 /hpf Casts 0-3 0 /lpf  
GLUCOSE, POC Collection Time: 04/10/21  8:46 PM  
Result Value Ref Range Glucose (POC) 163 (H) 65 - 100 mg/dL  Performed by Luther GLUCOSE, POC Collection Time: 04/11/21  5:32 AM  
Result Value Ref Range Glucose (POC) 194 (H) 65 - 100 mg/dL Performed by [facility] METABOLIC PANEL, BASIC Collection Time: 04/11/21  5:37 AM  
Result Value Ref Range Sodium 138 136 - 145 mmol/L Potassium 4.0 3.5 - 5.1 mmol/L Chloride 107 98 - 107 mmol/L  
 CO2 23 21 - 32 mmol/L Anion gap 8 7 - 16 mmol/L Glucose 176 (H) 65 - 100 mg/dL BUN 59 (H) 8 - 23 MG/DL Creatinine 8.25 (H) 0.6 - 1.0 MG/DL  
 GFR est AA 6 (L) >60 ml/min/1.73m2 GFR est non-AA 5 (L) >60 ml/min/1.73m2 Calcium 7.2 (L) 8.3 - 10.4 MG/DL MAGNESIUM Collection Time: 04/11/21  5:37 AM  
Result Value Ref Range Magnesium 1.9 1.8 - 2.4 mg/dL CBC WITH AUTOMATED DIFF Collection Time: 04/11/21  5:37 AM  
Result Value Ref Range WBC 5.4 4.3 - 11.1 K/uL  
 RBC 2.21 (L) 4.05 - 5.2 M/uL HGB 6.8 (LL) 11.7 - 15.4 g/dL HCT 21.1 (L) 35.8 - 46.3 % MCV 95.5 79.6 - 97.8 FL  
 MCH 30.8 26.1 - 32.9 PG  
 MCHC 32.2 31.4 - 35.0 g/dL  
 RDW 13.7 11.9 - 14.6 % PLATELET 851 (L) 345 - 450 K/uL MPV 12.1 9.4 - 12.3 FL ABSOLUTE NRBC 0.00 0.0 - 0.2 K/uL  
 DF AUTOMATED NEUTROPHILS 59 43 - 78 % LYMPHOCYTES 29 13 - 44 % MONOCYTES 9 4.0 - 12.0 % EOSINOPHILS 2 0.5 - 7.8 % BASOPHILS 0 0.0 - 2.0 % IMMATURE GRANULOCYTES 0 0.0 - 5.0 %  
 ABS. NEUTROPHILS 3.2 1.7 - 8.2 K/UL  
 ABS. LYMPHOCYTES 1.6 0.5 - 4.6 K/UL  
 ABS. MONOCYTES 0.5 0.1 - 1.3 K/UL  
 ABS. EOSINOPHILS 0.1 0.0 - 0.8 K/UL  
 ABS. BASOPHILS 0.0 0.0 - 0.2 K/UL  
 ABS. IMM. GRANS. 0.0 0.0 - 0.5 K/UL NT-PRO BNP Collection Time: 04/11/21  5:37 AM  
Result Value Ref Range NT pro-BNP 41,687 (H) 5 - 125 PG/ML  
 
4/9:  CXR:  CHF/volume overload.  
 
Care Plan discussed with: Patient and Nurse Signed By: Inés Mcelroy NP April 11, 2021

## 2021-04-11 NOTE — PROGRESS NOTES
Patient sitting up in recliner. This RN obtained a blood consent and educated patient about blood transfusion. No S/s of distress. Safety measures in place. Report given to [facility].

## 2021-04-11 NOTE — PROGRESS NOTES
Hourly rounds completed, pt denies needs at this time. Pt received 1 unit of PRBCs and hgb now 8.8. Lasix given and output recorded.

## 2021-04-11 NOTE — PROGRESS NOTES
Patient in chair watching TV. No needs expressed at this time. No S/S of distress. Safety measures in place. Will continue to monitor.

## 2021-04-12 NOTE — PROGRESS NOTES
TRANSFER - OUT REPORT: 
 
Verbal report given to Marlena Valdes RN(name) on Ajit Wagner  being transferred to dialysis(unit) for routine progression of care Report consisted of patients Situation, Background, Assessment and  
Recommendations(SBAR). Information from the following report(s) SBAR, Kardex, Procedure Summary, Intake/Output, MAR and Accordion was reviewed with the receiving nurse. Lines:  
Peripheral IV 04/12/21 Right Antecubital (Active) Site Assessment Clean, dry, & intact 04/12/21 0859 Phlebitis Assessment 0 04/12/21 0859 Infiltration Assessment 0 04/12/21 0859 Dressing Status Clean, dry, & intact 04/12/21 0859 Dressing Type Transparent;Tape 04/12/21 0859 Hub Color/Line Status Flushed;Capped; Patent 04/12/21 0859 Alcohol Cap Used Yes 04/12/21 0323 Opportunity for questions and clarification was provided. Patient transported with: 
 [place]

## 2021-04-12 NOTE — PROGRESS NOTES
ACUTE PHYSICAL THERAPY GOALS: 
(Developed with and agreed upon by patient and/or caregiver. ) LTG: 
(1.)Ms. Carol Barksdale will move from supine to sit and sit to supine, scoot up and down and roll side to side in bed INDEPENDENTLY with bed flat within 7 treatment day(s). (2.)Ms. Carol Barksdale will transfer from bed to chair and chair to bed INDEPENDENTLY within 7 treatment day(s). (3.)Ms. Carol Barksdale will ambulate INDEPENDENTLY for 500+ feet intact standing balance within 7 treatment day(s). ________________________________________________________________________________________________ PHYSICAL THERAPY ASSESSMENT: Initial Assessment and AM PT Treatment Day # 1 Ajti Wagner is a 57 y.o. female PRIMARY DIAGNOSIS: Acute hypoxemic respiratory failure ([address] Acute hypoxemic respiratory failure ([facility] [address] [J96.01] Reason for Referral:  Respiratory failure, ESRD 
ICD-10: Treatment Diagnosis: Generalized Muscle Weakness (M62.81) Other abnormalities of gait and mobility (R26.89) INPATIENT: Payor: /  
 
ASSESSMENT:  
 
REHAB RECOMMENDATIONS:  
Recommendation to date pending progress: 
[address] [place] [address] vs no needs pending progress Equipment:  To Be Determined PRIOR LEVEL OF FUNCTION: 
(Prior to Hospitalization) INITIAL/CURRENT LEVEL OF FUNCTION: 
(Most Recently Demonstrated) Bed Mobility: 
 Independent Sit to Stand:  Independent Transfers:  Independent Gait/Mobility: 
 Independent Bed Mobility: 
 Independent Sit to Stand:  Independent Transfers:  Independent Gait/Mobility: 
 Supervision ASSESSMENT: 
Ms. Carol Barksdale presents with respiratory failure due to volume overload/ESRD. She lives with daughter and is fully independent at baseline without DME use. Presents with very mildly decreased balance and activity tolerance. Ambulating in hallway with supervision to independence and no DME use and with accelerated pace. Min verbal cues for pacing/safety.  Seems close to baseline today however per chart review has plans to start dialysis today- would prabhakar to check back on her 1-2 more sessions after starting dialysis to ensure continued independence. HH PT vs no needs at dc pending progress. SUBJECTIVE:  
Ms. Ada Barbosa states, \"I'm fine, I'm ready to go home\" SOCIAL HISTORY/LIVING ENVIRONMENT: Lives with daughter in single story, first floor apartment. Fully independent without DME use. Home Environment: Apartment # Steps to Enter: 0 One/Two Story Residence: One story Living Alone: No 
Support Systems: Child(chichi) OBJECTIVE:  
 
PAIN: VITAL SIGNS: LINES/DRAINS:  
Pre Treatment: Pain Screen Pain Scale 1: Numeric (0 - 10) Pain Intensity 1: 0 Post Treatment: 0/10 Vital Signs O2 Device: None (Room air) none O2 Device: None (Room air) GROSS EVALUATION: 
 Within Functional Limits Abnormal/ Functional Abnormal/ Non-Functional (see comments) Not Tested Comments: AROM [x] [] [] [] PROM [] [] [] [] Strength [x] [] [] [] Balance [x] [] [] [] Posture [] [] [] [] Sensation [] [] [] [] Coordination [] [] [] [] Tone [] [] [] [] Edema [] [] [] [] Activity Tolerance [x] [] [] []   
 [] [] [] [] COGNITION/ 
PERCEPTION: Intact Impaired  
(see comments) Comments:  
Orientation [x] [] Vision [x] [] Hearing [x] [] Command Following [x] [] Safety Awareness [x] []   
 [] [] MOBILITY: I Mod I S SBA CGA Min Mod Max Total  NT x2 Comments:  
Bed Mobility Rolling [x] [] [] [] [] [] [] [] [] [] [] Supine to Sit [x] [] [] [] [] [] [] [] [] [] [] Scooting [x] [] [] [] [] [] [] [] [] [] [] Sit to Supine [x] [] [] [] [] [] [] [] [] [] []   
Transfers Sit to Stand [x] [] [] [] [] [] [] [] [] [] [] Bed to Chair [x] [] [] [] [] [] [] [] [] [] []   
Stand to Sit [x] [] [] [] [] [] [] [] [] [] [] I=Independent, Mod I=Modified Independent, S=Supervision, SBA=Standby Assistance, CGA=Contact Guard Assistance,  
Min=Minimal Assistance, Mod=Moderate Assistance, Max=Maximal Assistance, Total=Total Assistance, NT=Not Tested GAIT: I Mod I S SBA CGA Min Mod Max Total  NT x2 Comments:  
Level of Assistance [x] [] [x] [] [] [] [] [] [] [] [] Distance 400 ft   
DME N/A Gait Quality No deficits Weightbearing Status N/A I=Independent, Mod I=Modified Independent, S=Supervision, SBA=Standby Assistance, CGA=Contact Guard Assistance,  
Min=Minimal Assistance, Mod=Moderate Assistance, Max=Maximal Assistance, Total=Total Assistance, NT=Not Tested [facility] Mobility Inpatient Short Form How much difficulty does the patient currently have. .. Unable A Lot A Little None 1. Turning over in bed (including adjusting bedclothes, sheets and blankets)? [] 1   [] 2   [] 3   [x] 4  
2. Sitting down on and standing up from a chair with arms ( e.g., wheelchair, bedside commode, etc.)   [] 1   [] 2   [] 3   [x] 4  
3. Moving from lying on back to sitting on the side of the bed? [] 1   [] 2   [] 3   [x] 4 How much help from another person does the patient currently need. .. Total A Lot A Little None 4. Moving to and from a bed to a chair (including a wheelchair)? [] 1   [] 2   [] 3   [x] 4  
5. Need to walk in hospital room? [] 1   [] 2   [] 3   [x] 4  
6. Climbing 3-5 steps with a railing? [] 1   [] 2   [x] 3   [] 4  
© 2007, Trustees of [facility] [employer], under license to [employer]. All rights reserved Score:  Initial: 23 Most Recent: X (Date: -- ) Interpretation of Tool:  Represents activities that are increasingly more difficult (i.e. Bed mobility, Transfers, Gait). PLAN:  
FREQUENCY/DURATION: PT Plan of Care: 2 times/week for duration of hospital stay or until stated goals are met, whichever comes first. 
 
PROBLEM LIST:  
(Skilled intervention is medically necessary to address:) 1. Decreased Balance 2. Decreased Gait Ability 3. Decreased Strength  INTERVENTIONS PLANNED: (Benefits and precautions of physical therapy have been discussed with the patient.) 1. Therapeutic Activity 2. Therapeutic Exercise/HEP 3. Neuromuscular Re-education 4. Gait Training 5. Manual Therapy 6. Education TREATMENT:  
 
EVALUATION: Low Complexity : (Untimed Charge) TREATMENT:  
(     ) Evaluation only TREATMENT GRID: 
N/A 
 
AFTER TREATMENT POSITION/PRECAUTIONS: 
Chair, Needs within reach and RN notified INTERDISCIPLINARY COLLABORATION: 
RN/PCT, PT/PTA and OT/TOBIAS TOTAL TREATMENT DURATION: 
PT Patient Time In/Time Out Time In: 0848 Time Out: 8060 Luz Marina Brito DPT

## 2021-04-12 NOTE — PROGRESS NOTES
ACUTE OT GOALS: 
(Developed with and agreed upon by patient and/or caregiver.) 
ALETA Fitzgerald is at/near baseline for ADLs. OCCUPATIONAL THERAPY ASSESSMENT: Initial Assessment and Discharge OT Treatment Day Kaden Fitzgerald is a 57 y.o. female PRIMARY DIAGNOSIS: Acute hypoxemic respiratory failure ([address] Acute hypoxemic respiratory failure ([facility] [address] [J96.01] Reason for Referral: ICD-10: Treatment Diagnosis: Generalized Muscle Weakness (M62.81) INPATIENT: Payor: /  
ASSESSMENT:  
 
REHAB RECOMMENDATIONS:  
Recommendation to date pending progress: 
Setting:  No further skilled therapy Equipment:  
 None PRIOR LEVEL OF FUNCTION: 
(Prior to Hospitalization)  INITIAL/CURRENT LEVEL OF FUNCTION: 
(Based on today's evaluation) Bathing: 
 Independent Dressing:  Independent Feeding/Grooming:  Independent Toileting:  Independent Functional Mobility: 
 Independent Bathing: 
 Independent Dressing:  Independent Feeding/Grooming:  Independent Toileting:  Independent Functional Mobility: 
 Independent ASSESSMENT: 
Ms. Milagros Shaw presents with SOB, acute respiratory failure, volume overload. At baseline pt lives with daughter, completes ADLs and ambulates with independence. Daughter assists with IADLs and transportation. Pt practiced bed mobility, functional transfers, and ambulation with supervision to independence. No losses of balance noted. Pt reports she is up ad vince in room and is at her baseline for ADLs, reports breathing has returned to normal. Pt is currently functioning at/near baseline for ADLs. Per chart pt going to start HD, defer to PT to follow up s/p HD for endurance. No indication for skilled OT at this time. Will d/c.  
  
 
SUBJECTIVE:  
Ms. Milagros Shaw states, \"No one gives me a bath. \" 
 
SOCIAL HISTORY/LIVING ENVIRONMENT: At baseline pt lives with daughter, completes ADLs and ambulates with independence.  Daughter assists with IADLs and transportation. Pt endorses 1 recent fall. Home Environment: Apartment # Steps to Enter: 0 One/Two Story Residence: One story Living Alone: No 
Support Systems: Child(chichi) OBJECTIVE:  
 
PAIN: VITAL SIGNS: LINES/DRAINS:  
Pre Treatment: Pain Screen Pain Scale 1: Numeric (0 - 10) Pain Intensity 1: 0 Post Treatment: same Vital Signs O2 Device: None (Room air) O2 Device: None (Room air) GROSS EVALUATION: 
BUEs Within Functional Limits Abnormal/ Functional Abnormal/ Non-Functional (see comments) Not Tested Comments: AROM [x] [] [] [] PROM [] [] [] [] Strength [] [x] [] [] Balance [x] [] [] [] Posture [x] [] [] [] Sensation [x] [] [] [] Coordination [x] [] [] [] Tone [x] [] [] [] Edema [x] [] [] [] Activity Tolerance [x] [] [] []   
 [] [] [] [] COGNITION/ 
PERCEPTION: Intact Impaired  
(see comments) Comments:  
Orientation [x] [] Vision [x] [] Hearing [x] [] Judgment/ Insight [x] [] Attention [x] [] Memory [] [] Command Following [x] [] Emotional Regulation [x] []   
 [] [] ACTIVITIES OF DAILY LIVING: I Mod I S SBA CGA Min Mod Max Total NT Comments BASIC ADLs:             
Bathing/ Showering [] [] [] [] [] [] [] [] [] [x] Toileting [] [] [] [] [] [] [] [] [] [x] Dressing [] [] [] [] [] [] [] [] [] [x] Feeding [] [] [] [] [] [] [] [] [] [x] Grooming [] [] [] [] [] [] [] [] [] [x] Personal Device Care [] [] [] [] [] [] [] [] [] [x] Functional Mobility [x] [] [x] [] [] [] [] [] [] [] I=Independent, Mod I=Modified Independent, S=Supervision, SBA=Standby Assistance, CGA=Contact Guard Assistance,  
Min=Minimal Assistance, Mod=Moderate Assistance, Max=Maximal Assistance, Total=Total Assistance, NT=Not Tested MOBILITY: I Mod I S SBA CGA Min Mod Max Total  NT x2 Comments:  
Supine to sit [x] [] [] [] [] [] [] [] [] [] [] Sit to supine [] [] [] [] [] [] [] [] [] [x] [] Sit to stand [x] [] [x] [] [] [] [] [] [] [] [] Bed to chair [x] [] [x] [] [] [] [] [] [] [] [] Without AD I=Independent, Mod I=Modified Independent, S=Supervision, SBA=Standby Assistance, CGA=Contact Guard Assistance,  
Min=Minimal Assistance, Mod=Moderate Assistance, Max=Maximal Assistance, Total=Total Assistance, NT=Not Tested [facility] Daily Activity Inpatient Short Form How much help from another person does the patient currently need. .. Total A Lot A Little None 1. Putting on and taking off regular lower body clothing? [] 1   [] 2   [] 3   [x] 4  
2. Bathing (including washing, rinsing, drying)? [] 1   [] 2   [] 3   [x] 4  
3. Toileting, which includes using toilet, bedpan or urinal?   [] 1   [] 2   [] 3   [x] 4  
4. Putting on and taking off regular upper body clothing? [] 1   [] 2   [] 3   [x] 4  
5. Taking care of personal grooming such as brushing teeth? [] 1   [] 2   [] 3   [x] 4  
6. Eating meals? [] 1   [] 2   [] 3   [x] 4  
© 2007, Trustees of [address] [place] [address], under license to [employer]. All rights reserved Score:  Initial: 24 4/12/21 Most Recent: X (Date: -- ) Interpretation of Tool:  Represents activities that are increasingly more difficult (i.e. Bed mobility, Transfers, Gait). PLAN:  
FREQUENCY/DURATION: OT Plan of Care: (Eval & d/c) for duration of hospital stay or until stated goals are met, whichever comes first. 
 
PROBLEM LIST:  
(Skilled intervention is medically necessary to address:) 1. None INTERVENTIONS PLANNED:  
(Benefits and precautions of occupational therapy have been discussed with the patient.) 1. Education TREATMENT:  
 
EVALUATION: Low Complexity : (Untimed Charge) TREATMENT:  
(     ) 
NA  
Co-Treatment PT/OT appropriate due to patient's ability to benefit/learn from both disciplines for functional transfers/mobilty and functional tasks, with PT addressing gait while OT addressing return to independence with ADLs and education on the importance of being out of bed. TREATMENT GRID: 
N/A 
 
AFTER TREATMENT POSITION/PRECAUTIONS: 
Chair and Needs within reach INTERDISCIPLINARY COLLABORATION: 
RN/PCT, PT/PTA and OT/TOBIAS TOTAL TREATMENT DURATION: 
OT Patient Time In/Time Out Time In: 0848 Time Out: 4020 Leah Rodarte, OTR/L

## 2021-04-12 NOTE — PROGRESS NOTES
Hospitalist Progress Note Subjective:  
Daily Progress Note: 4/12/2021 3052 
 
 Patient presented to ER 4/9 with 72 hour history of progressively worsening SOB, orthopnea, LE edema, and back pain.  No known pulmonary history.  Constipated with no BM x 4 days.  ESRD with referral 5/5/21 for vein mapping for HD. Hypertensive to 180/100 on arrival.  CXR with volume overload.  Given labetalol and lasix.  Oxygen sat on arrival 87% on room air, 2 liters oxygen with improvement to 97%.  Nephrology in for consult.  Creatinine: 7.7, not much change from last month.  Hospitalized last month at [facility] for hypoglycemia.    
4/10:  Reports feeling better this am,  Voiding frequently however output has not been measured.   
Oxygen sat stable on room air. Denies pain or need.  Nephrology with plans as above.   
4/11:  24 OP only 750 with intake of 1010. Blood pressure improved. Hgb 6.8 this am.  Pending transfusion. Up to bathroom, instructed to leave urine in hat. Urine with 5-10 WBC, culture pending. Continued oxygen at 4 liters with sats %. States is feeling better. Plans for AV access tomorrow with HD to follow. 4/12:  HGB: 7.8. Creatinine: 8.49. Pending HD access placement this am with dialysis to follow. Remains NPO. Reports breathing with less effort. Oxygen sats stable on room air. Measured OP x 24 hours: 1920.  2 voids that were not measured. 
  
Assessment/Plan:  
ACUTE HYPOXEMIC RESPIRATORY FAILURE:  DUE TO VOLUME OVERLOAD SECONDARY TO ESRD/CKD V. 
            Continue 60 mg IV lasix bid, strict I+O-morin if needed 
            Nephrology on board, appreciate HD temp cath placement scheduled for today with HD to follow             Blood pressure control             Continue oxygen, titrate as needed: 4/10:  Stable on room air 
  
ACUTE VOLUME OVERLOAD:  BNP ON ADMISSION:  48,000 
            TTE 3/14/21:  EF 45-50%, dilated LV, Grade 2 DD 
            MDK not order another echocardiogram  
 4/12:  Continues to diurese slowly  
  
ESRD:  CURRENT PLANS FOR HD ACCESS PLACEMENT 4/12 
            Continue lasix, bicarb 
            Nephrology on board, appreciate             Nephrology with plans for HD 4/12 after HD access placement 
  
HYPERTENSION WITH HYPERTENSIVE URGENCY:   
            Continue home meds 
  
NIDDM II:  A1C 7.0  
            Has not been taking meds in a few weeks: On po at home  
            Routine SSI ac tid and HS 
  
CHRONIC SYSTOLIC AND DIASTOLIC HEART FAILURE:   
            Continue coreg and IV lasix 
  
32 PACK YEAR SMOKING HISTORY:  Nicotine patch prn 
            Encourage cessation 
  
ANEMIA OF CHRONIC DISEASE:  HGB ADM: 8.0.  DUE TO RENAL DISEASE 
            No current or past bleeding  
            Follow 
  
Current Facility-Administered Medications Medication Dose Route Frequency  HYDROcodone-acetaminophen (NORCO) 5-325 mg per tablet 1 Tab  1 Tab Oral Q4H PRN  
 0.9% sodium chloride infusion 250 mL  250 mL IntraVENous PRN  
 insulin lispro (HUMALOG) injection   SubCUTAneous AC&HS  nicotine (NICODERM CQ) 21 mg/24 hr patch 1 Patch  1 Patch TransDERmal Q24H  
 atorvastatin (LIPITOR) tablet 40 mg  40 mg Oral DAILY  carvediloL (COREG) tablet 25 mg  25 mg Oral BID WITH MEALS  famotidine (PEPCID) tablet 40 mg  40 mg Oral DAILY  NIFEdipine ER (PROCARDIA XL) tablet 60 mg  60 mg Oral DAILY  sodium bicarbonate tablet 650 mg  650 mg Oral TID  sodium chloride (NS) flush 5-40 mL  5-40 mL IntraVENous Q8H  
 sodium chloride (NS) flush 5-40 mL  5-40 mL IntraVENous PRN  
 acetaminophen (TYLENOL) tablet 650 mg  650 mg Oral Q6H PRN Or  
 acetaminophen (TYLENOL) suppository 650 mg  650 mg Rectal Q6H PRN  polyethylene glycol (MIRALAX) packet 17 g  17 g Oral DAILY PRN  promethazine (PHENERGAN) tablet 12.5 mg  12.5 mg Oral Q6H PRN  Or  
 ondansetron (ZOFRAN) injection 4 mg  4 mg IntraVENous Q6H PRN  
 heparin (porcine) injection 5,000 Units  5,000 Units SubCUTAneous Q8H  
 furosemide (LASIX) injection 60 mg  60 mg IntraVENous BID Review of Systems A comprehensive review of systems was negative except for that written in the HPI. Patient is a vague historian. Objective:  
 
Visit Vitals BP (!) 154/95 Pulse 85 Temp 98.1 °F (36.7 °C) Resp 19 Ht 5' 3\" (1.6 m) Wt 65.4 kg (144 lb 3.2 oz) SpO2 95% Breastfeeding No  
BMI 25.54 kg/m² O2 Flow Rate (L/min): 4 l/min O2 Device: None (Room air) Temp (24hrs), Av.2 °F (36.8 °C), Min:97.4 °F (36.3 °C), Max:98.8 °F (37.1 °C) 
 
04/10 1901 -  0700 In: 1180.4 [P.O.:840] Out: 2670 [Urine:2670] General appearance: Oriented and alert, cooperative, speech difficult to understand due to no teeth.  Well nourished, well hydrated. In usual good spirits. Head: Normocephalic, without obvious abnormality, atraumatic Throat: Lips, mucosa, and tongue normal. Teeth and gums normal 
Neck: supple, symmetrical, trachea midline, and no JVD Lungs: Diminished in bases, otherwise clear to auscultation bilaterally Heart: Loud heart sounds regular rate and rhythm, S1, S2 normal, no murmur, click, rub or gallop Abdomen: soft, non-tender. Bowel sounds normal. No masses,  no organomegaly Extremities: All extremities normal, atraumatic, no cyanosis.  No LE edema this am  
Skin: Skin color, texture, turgor normal. No rashes or lesions Neurologic: Grossly normal 
  
Additional comments: Notes,orders, test results, vitals reviewed 
  
Data Review Recent Results (from the past 24 hour(s)) GLUCOSE, POC Collection Time: 21 10:47 AM  
Result Value Ref Range Glucose (POC) 208 (H) 65 - 100 mg/dL Performed by Tim)   
HGB & HCT Collection Time: 21  4:14 PM  
Result Value Ref Range HGB 8.8 (L) 11.7 - 15.4 g/dL HCT 27.1 (L) 35.8 - 46.3 % GLUCOSE, POC Collection Time: 21  4:24 PM  
Result Value Ref Range  Glucose (POC) 244 (H) 65 - 100 mg/dL Performed by Kimberlee(AS)   
GLUCOSE, POC Collection Time: 04/11/21  8:53 PM  
Result Value Ref Range Glucose (POC) 277 (H) 65 - 100 mg/dL Performed by Maricruz METABOLIC PANEL, COMPREHENSIVE Collection Time: 04/12/21  5:02 AM  
Result Value Ref Range Sodium 138 136 - 145 mmol/L Potassium 4.0 3.5 - 5.1 mmol/L Chloride 105 98 - 107 mmol/L  
 CO2 24 21 - 32 mmol/L Anion gap 9 7 - 16 mmol/L Glucose 134 (H) 65 - 100 mg/dL BUN 63 (H) 8 - 23 MG/DL Creatinine 8.49 (H) 0.6 - 1.0 MG/DL  
 GFR est AA 6 (L) >60 ml/min/1.73m2 GFR est non-AA 5 (L) >60 ml/min/1.73m2 Calcium 7.3 (L) 8.3 - 10.4 MG/DL Bilirubin, total 0.5 0.2 - 1.1 MG/DL  
 ALT (SGPT) 14 12 - 65 U/L  
 AST (SGOT) 13 (L) 15 - 37 U/L Alk. phosphatase 87 50 - 136 U/L Protein, total 6.3 6.3 - 8.2 g/dL Albumin 2.9 (L) 3.2 - 4.6 g/dL Globulin 3.4 2.3 - 3.5 g/dL A-G Ratio 0.9 (L) 1.2 - 3.5 MAGNESIUM Collection Time: 04/12/21  5:02 AM  
Result Value Ref Range Magnesium 1.8 1.8 - 2.4 mg/dL PHOSPHORUS Collection Time: 04/12/21  5:02 AM  
Result Value Ref Range Phosphorus 5.8 (H) 2.3 - 3.7 MG/DL  
CBC WITH AUTOMATED DIFF Collection Time: 04/12/21  5:02 AM  
Result Value Ref Range WBC 6.8 4.3 - 11.1 K/uL  
 RBC 2.50 (L) 4.05 - 5.2 M/uL HGB 7.8 (L) 11.7 - 15.4 g/dL HCT 23.5 (L) 35.8 - 46.3 % MCV 94.0 79.6 - 97.8 FL  
 MCH 31.2 26.1 - 32.9 PG  
 MCHC 33.2 31.4 - 35.0 g/dL  
 RDW 14.9 (H) 11.9 - 14.6 % PLATELET 792 (L) 438 - 450 K/uL MPV 12.2 9.4 - 12.3 FL ABSOLUTE NRBC 0.00 0.0 - 0.2 K/uL  
 DF AUTOMATED NEUTROPHILS 59 43 - 78 % LYMPHOCYTES 27 13 - 44 % MONOCYTES 11 4.0 - 12.0 % EOSINOPHILS 2 0.5 - 7.8 % BASOPHILS 0 0.0 - 2.0 % IMMATURE GRANULOCYTES 0 0.0 - 5.0 %  
 ABS. NEUTROPHILS 4.0 1.7 - 8.2 K/UL  
 ABS. LYMPHOCYTES 1.8 0.5 - 4.6 K/UL  
 ABS. MONOCYTES 0.7 0.1 - 1.3 K/UL  
 ABS. EOSINOPHILS 0.2 0.0 - 0.8 K/UL  
 ABS. BASOPHILS 0.0 0.0 - 0.2 K/UL  
 ABS. IMM. GRANS. 0.0 0.0 - 0.5 K/UL NT-PRO BNP Collection Time: 04/12/21  5:02 AM  
Result Value Ref Range NT pro-BNP 31,983 (H) 5 - 125 PG/ML  
GLUCOSE, POC Collection Time: 04/12/21  6:10 AM  
Result Value Ref Range Glucose (POC) 147 (H) 65 - 100 mg/dL Performed by Maricruz   
 
 4/9:  CXR:  CHF/volume overload.  
 
Care Plan discussed with: Patient and Nurse Signed By: Jil Galindo NP April 12, 2021

## 2021-04-12 NOTE — PROGRESS NOTES
Sachi Levi Admission Date: 4/9/2021 Renal Daily Progress Note: 4/12/2021 The patient's chart is reviewed and the patient is discussed with the staff. Follow up ESRD Subjective:  
Patient seen and examined on HD-#1, dialyzing via right  Qb, UF 1600 tolerating dialysis Labs and chart reviewed ROS: 
General: no fever/chills, appetite ok CV: no CP Lung: + SOB- better, no cough GI: no N/V/D 
: no dysuria Ext: no edema Current Facility-Administered Medications Medication Dose Route Frequency  tuberculin injection 5 Units  5 Units IntraDERMal ONCE  
 [START ON 4/13/2021] famotidine (PEPCID) tablet 20 mg  20 mg Oral DAILY  0.9% sodium chloride infusion  25 mL/hr IntraVENous CONTINUOUS  
 fentaNYL citrate (PF) injection 12.5-100 mcg  12.5-100 mcg IntraVENous Multiple  midazolam (VERSED) injection 0.25-2 mg  0.25-2 mg IntraVENous Multiple  HYDROcodone-acetaminophen (NORCO) 5-325 mg per tablet 1 Tab  1 Tab Oral Q4H PRN  
 0.9% sodium chloride infusion 250 mL  250 mL IntraVENous PRN  
 insulin lispro (HUMALOG) injection   SubCUTAneous AC&HS  nicotine (NICODERM CQ) 21 mg/24 hr patch 1 Patch  1 Patch TransDERmal Q24H  
 atorvastatin (LIPITOR) tablet 40 mg  40 mg Oral DAILY  carvediloL (COREG) tablet 25 mg  25 mg Oral BID WITH MEALS  
 NIFEdipine ER (PROCARDIA XL) tablet 60 mg  60 mg Oral DAILY  sodium bicarbonate tablet 650 mg  650 mg Oral TID  sodium chloride (NS) flush 5-40 mL  5-40 mL IntraVENous Q8H  
 sodium chloride (NS) flush 5-40 mL  5-40 mL IntraVENous PRN  
 acetaminophen (TYLENOL) tablet 650 mg  650 mg Oral Q6H PRN Or  
 acetaminophen (TYLENOL) suppository 650 mg  650 mg Rectal Q6H PRN  polyethylene glycol (MIRALAX) packet 17 g  17 g Oral DAILY PRN  promethazine (PHENERGAN) tablet 12.5 mg  12.5 mg Oral Q6H PRN  Or  
 ondansetron (ZOFRAN) injection 4 mg  4 mg IntraVENous Q6H PRN  
 heparin (porcine) injection 5,000 Units  5,000 Units SubCUTAneous Q8H  
 [Held by provider] furosemide (LASIX) injection 60 mg  60 mg IntraVENous BID Objective:  
 
Vitals:  
 04/12/21 1301 04/12/21 1359 04/12/21 1429 04/12/21 1500 BP: (!) 162/80 (!) 176/98 (!) 174/91 (!) 168/90 Pulse: 78 81 78 74 Resp:      
Temp:      
SpO2: 93% Weight:      
Height:      
 
Intake and Output:  
04/10 1901 - 04/12 0700 In: 1180.4 [P.O.:840] Out: 2670 [Urine:2670] 04/12 0701 - 04/12 1900 In: 0 Out: 900 [Urine:900] Physical Exam:  
Constitutional:  the patient is well developed and in no acute distress, alert and oriented HEENT:  Sclera clear, pupils equal, oral mucosa moist 
Lungs: clear bilaterally Cardiovascular:  Regular rate, S1, S2, no Rub Abd/GI: soft and non-tender; with positive bowel sounds. Ext: warm without cyanosis. There is no lower leg edema. Skin:  no jaundice or rashes Neuro: no gross neuro deficits Psychiatric: Calm. Access: right TCC- intact LAB Recent Labs 04/12/21 
0502 04/11/21 
1614 04/11/21 
2745 04/10/21 
8586 WBC 6.8  --  5.4 7.4 HGB 7.8* 8.8* 6.8* 7.1*  
HCT 23.5* 27.1* 21.1* 22.2*  
*  --  124* 142* Recent Labs 04/12/21 
0502 04/11/21 
9826 04/10/21 
5920 04/10/21 
1758  138 139  --   
K 4.0 4.0 4.3  --   
 107 109*  --   
CO2 24 23 21  --   
* 176* 172*  --   
BUN 63* 59* 58*  --   
CREA 8.49* 8.25* 7.86*  --   
MG 1.8 1.9  --  1.4* PHOS 5.8*  --  5.2*  --   
ALB 2.9*  --   --   -- No results for input(s): PH, PCO2, PO2, HCO3 in the last 72 hours. Assessment:  (Medical Decision Making) Hospital Problems  Date Reviewed: 4/9/2021 Codes Class Noted POA * (Principal) Acute hypoxemic respiratory failure (HCC) ICD-10-CM: J96.01 
ICD-9-CM: 518.81  4/9/2021 Unknown  
   
 CKD (chronic kidney disease) ICD-10-CM: N18.9 ICD-9-CM: 585.9  4/9/2021 Unknown  Hypertension ICD-10-CM: I10 
ICD-9-CM: 401.9 4/9/2021 Unknown Diabetes mellitus type 2, controlled ([facility] [address] ICD-10-CM: E11.9 ICD-9-CM: 250.00  4/9/2021 Unknown Plan:  (Medical Decision Making) 1. CKD V/ESRD- TCC placed today by IR and seen on 1st HD tolerating dialysis HD again tomorrow, obtain bilat upper extremity vein mapping and consult vascular for access placement DW CW for outpt HD slot pt lives closest to [facility] 2. HTN continue antihypertensives, UF with HD 3. Anemia- s/p PRBC, low Fe studies, add Fe 4. DM SSI Archana Zelaya, NP

## 2021-04-12 NOTE — PROGRESS NOTES
MSN, CM:  Patent off floor this AM for dialysis access then HD right after. Will await for her return to floor for assessment.

## 2021-04-12 NOTE — DIALYSIS
TRANSFER OUT- DIALYSIS Hemodialysis treatment completed without complications. Patient alert and VS stable  /88  P 78 1 Kgs removed. Flushed both ports with 10 mL of NS.  CVC dressing clean, dry, and intact, tego caps intact, bilateral lumens wrapped with 4x4 gauze. Meds given-0. 0 units of RBCs given during dialysis. Patient to [phone] after dialysis.

## 2021-04-12 NOTE — PROGRESS NOTES
Patient NPO for IR procedure. No s/s of distress. Safety measures in place. Report given to [facility].

## 2021-04-12 NOTE — DIALYSIS
TRANSFER IN - DIALYSIS Received patient in dialysis unit  from IR (unit) for ordered procedure. Consent verified for renal replacement therapy. Patient alert and vital signs stable. /98 P81 Hemodialysis initiated using right chest catheter. Aspirated and flushed both ports without difficulty. Dressing clean, dry and intact. Machine settings per MD order. Will monitor during treatment.

## 2021-04-13 NOTE — DIALYSIS
TRANSFER IN - DIALYSIS Received patient in dialysis unit  From [facility] for ordered procedure. Consent verified for renal replacement therapy. Patient alert and vital signs stable. /99 P 86 Hemodialysis initiated using CVC, right IJ. Aspirated and flushed both ports without difficulty. Dressing clean, dry and intact. Machine settings per MD order. Will monitor during treatment.

## 2021-04-13 NOTE — PROGRESS NOTES
Patient resting quietly in bed. Respirations even and unlabored on room air. No s/sx of distress and patient denies pain at this time. No needs expressed. Safety measures in place, call light in reach. Preparing to give BSR to oncoming RN upon arrival to floor.

## 2021-04-13 NOTE — ACP (ADVANCE CARE PLANNING)
Advance Care Planning General Advance Care Planning (ACP) Conversation Date of Conversation: 4/9/2021 Conducted with: Patient with Decision Making Capacity Healthcare Decision Maker:  
  Primary Decision Maker: Chandrika Chaudhry - Other Relative - [phone] Secondary Decision Maker: Yoon Silverman - [phone] Click here to complete [address] including selection of the Healthcare Decision Maker Relationship (ie \"Primary\") Today we documented desired Decision Maker(s), who is (are) NOT Legal Next of Kin. ACP documents are required for decision maker authority. Content/Action Overview:  
Has NO ACP documents/care preferences - requested patient complete ACP documents Reviewed DNR/DNI and patient elects Full Code (Attempt Resuscitation) Topics discussed: ventilation preferences and resuscitation preferences Length of Voluntary ACP Conversation in minutes:  <16 minutes (Non-Billable) Mary Up RN

## 2021-04-13 NOTE — PROGRESS NOTES
Hospitalist Progress Note Subjective:  
Daily Progress Note: 4/13/2021 2:17 PM 
 
 Patient presented to ER 4/9 with 72 hour history of progressively worsening SOB, orthopnea, LE edema, and back pain.  No known pulmonary history.  Constipated with no BM x 4 days.  ESRD with referral 5/5/21 for vein mapping for HD. Hypertensive to 180/100 on arrival.  CXR with volume overload.  Given labetalol and lasix.  Oxygen sat on arrival 87% on room air, 2 liters oxygen with improvement to 97%.  Nephrology in. Creatinine: 7.7, not much change from last month.  Hospitalized last month at [facility] for hypoglycemia.    
4/10:  Reports feeling better this am,  Voiding frequently however output has not been measured.   
Oxygen sat stable on room air. Denies pain or need.  Nephrology with plans as above.   
4/11:  24 OP only 750 with intake of 1010.  Blood pressure improved.  Hgb 6.8 this am.  Pending transfusion.  Up to bathroom, instructed to leave urine in hat. Urine with 5-10 WBC, culture pending.  Continued oxygen at 4 liters with sats %.  States is feeling better.  Plans for AV access tomorrow with HD to follow.    
4/12:  HGB: 7.8. Creatinine: 8.49. Pending HD access placement this am with dialysis to follow. Remains NPO. Reports breathing with less effort. Oxygen sats stable on room air. Measured OP x 24 hours: 1920.  2 voids not measured. 4/13:  Right CVC IJ HD access placed yesterday. HD #1, tolerated well. HD #2 today with significant symptomatic blood pressure drop, unable to tolerate any fluid removal.  130/80 after rinseback. CM securing OP HD slot at [facility]. Sitting up in bed talking with family. No complaints in good spirits. ASSESSMENT AND PLAN:   
ACUTE HYPOXEMIC RESPIRATORY FAILURE:  DUE TO VOLUME OVERLOAD SECONDARY TO ESRD/CKD V. 
            4/13:  Nephro holding 60 mg IV lasix bid,  
 Strict I+O 
            Nephrology on board, appreciate             4/13: Right IJ temp CVC HD temp cath placed. 4/12:  HD #1  
 4/13:  HD #2, symptomatic hypotension, unable to withdraw fluid per note  
            Blood pressure control             Continue oxygen, titrate as needed: 4/10:  Stable on room air 
  
ACUTE VOLUME OVERLOAD:  BNP ON ADMISSION:  48,000 
            TTE 3/14/21:  EF 45-50%, dilated LV, Grade 2 DD 
            BLE not order another echocardiogram  
            4/12:  Continues to diurese slowly HD #1 
 4/13:  HD #2, did not tolerate as noted above  
  
ESRD:  TEMP IJ HD CVC PLACEMENT 4/12 WITH #1 HD             
            Nephrology on board, appreciate  
  
HYPERTENSION WITH HYPERTENSIVE URGENCY:   
            Continue home meds 
  
NIDDM II:  A1C 7.0  
            Has not been taking meds in a few weeks:  On po at home  
            Routine SSI ac tid and HS 
  
CHRONIC SYSTOLIC AND DIASTOLIC HEART FAILURE:   
            Continue coreg, nEPHRO HOLDING LASIX 
  
32 PACK YEAR SMOKING HISTORY:  Nicotine patch prn 
            Encourage cessation 
  
ANEMIA OF CHRONIC DISEASE:  HGB ADM: 8.0.  DUE TO RENAL DISEASE 
            No current or past bleeding  
            Follow 
  
Current Facility-Administered Medications Medication Dose Route Frequency  tuberculin injection 5 Units  5 Units IntraDERMal ONCE  
 famotidine (PEPCID) tablet 20 mg  20 mg Oral DAILY  ferrous sulfate tablet 650 mg  2 Tab Oral BID WITH MEALS  
 HYDROcodone-acetaminophen (NORCO) 5-325 mg per tablet 1 Tab  1 Tab Oral Q4H PRN  
 0.9% sodium chloride infusion 250 mL  250 mL IntraVENous PRN  
 insulin lispro (HUMALOG) injection   SubCUTAneous AC&HS  nicotine (NICODERM CQ) 21 mg/24 hr patch 1 Patch  1 Patch TransDERmal Q24H  
 atorvastatin (LIPITOR) tablet 40 mg  40 mg Oral DAILY  carvediloL (COREG) tablet 25 mg  25 mg Oral BID WITH MEALS  
 NIFEdipine ER (PROCARDIA XL) tablet 60 mg  60 mg Oral DAILY  sodium chloride (NS) flush 5-40 mL  5-40 mL IntraVENous Q8H  
 sodium chloride (NS) flush 5-40 mL  5-40 mL IntraVENous PRN  
 acetaminophen (TYLENOL) tablet 650 mg  650 mg Oral Q6H PRN Or  
 acetaminophen (TYLENOL) suppository 650 mg  650 mg Rectal Q6H PRN  polyethylene glycol (MIRALAX) packet 17 g  17 g Oral DAILY PRN  promethazine (PHENERGAN) tablet 12.5 mg  12.5 mg Oral Q6H PRN Or  
 ondansetron (ZOFRAN) injection 4 mg  4 mg IntraVENous Q6H PRN  
 heparin (porcine) injection 5,000 Units  5,000 Units SubCUTAneous Q8H  
 [Held by provider] furosemide (LASIX) injection 60 mg  60 mg IntraVENous BID Review of Systems A comprehensive review of systems was negative except for that written in the HPI. Objective:  
 
Visit Vitals BP (!) 153/79 Pulse 85 Temp 98 °F (36.7 °C) Resp 20 Ht 5' 3\" (1.6 m) Wt 62.1 kg (136 lb 12.8 oz) SpO2 97% Breastfeeding No  
BMI 24.23 kg/m² O2 Flow Rate (L/min): 5 l/min O2 Device: None (Room air) Temp (24hrs), Av.3 °F (36.8 °C), Min:98 °F (36.7 °C), Max:98.5 °F (36.9 °C) 
 
 1901 -  0700 In: 461 [P.O.:461] Out: 4420 [ZHDSD:3607] General appearance: Oriented and alert, cooperative, sitting up in bed talking to family after attempted HD #2. In good spirits. Well nourished, well hydrated. Head: Normocephalic, without obvious abnormality, atraumatic Throat: Lips, mucosa, and tongue normal. Teeth and gums normal 
Neck: supple, symmetrical, trachea midline, and no JVD Lungs: Diminished in bases, otherwise clear to auscultation bilaterally Heart: Loud heart sounds regular rate and rhythm, S1, S2 normal, no murmur, click, rub or gallop Abdomen: soft, non-tender. Bowel sounds normal. No masses,  no organomegaly Extremities: All extremities normal, atraumatic, no cyanosis.  No LE edema this am  
Skin: Skin color, texture, turgor normal. No rashes or lesions Neurologic: Grossly normal 
  
Additional comments: Notes,orders, test results, vitals reviewed 
  
Data Review Recent Results (from the past 24 hour(s)) HEPATITIS B CORE AB, IGM Collection Time: 04/12/21  2:37 PM  
Result Value Ref Range Hepatitis B core, IgM NONREACTIVE NR    
HEP B SURFACE AB Collection Time: 04/12/21  2:37 PM  
Result Value Ref Range Hepatitis B surface Ab 3.19 mIU/mL GLUCOSE, POC Collection Time: 04/12/21  4:18 PM  
Result Value Ref Range Glucose (POC) 157 (H) 65 - 100 mg/dL Performed by [employer] [facility] GLUCOSE, POC Collection Time: 04/12/21  8:39 PM  
Result Value Ref Range Glucose (POC) 287 (H) 65 - 100 mg/dL Performed by [facility] GLUCOSE, POC Collection Time: 04/13/21  5:32 AM  
Result Value Ref Range Glucose (POC) 156 (H) 65 - 100 mg/dL Performed by [employer] [facility] METABOLIC PANEL, BASIC Collection Time: 04/13/21  6:30 AM  
Result Value Ref Range Sodium 139 136 - 145 mmol/L Potassium 3.8 3.5 - 5.1 mmol/L Chloride 106 98 - 107 mmol/L  
 CO2 26 21 - 32 mmol/L Anion gap 7 7 - 16 mmol/L Glucose 146 (H) 65 - 100 mg/dL BUN 46 (H) 8 - 23 MG/DL Creatinine 6.53 (H) 0.6 - 1.0 MG/DL  
 GFR est AA 8 (L) >60 ml/min/1.73m2 GFR est non-AA 7 (L) >60 ml/min/1.73m2 Calcium 8.0 (L) 8.3 - 10.4 MG/DL  
CBC WITH AUTOMATED DIFF Collection Time: 04/13/21  6:30 AM  
Result Value Ref Range WBC 7.0 4.3 - 11.1 K/uL  
 RBC 2.80 (L) 4.05 - 5.2 M/uL HGB 8.7 (L) 11.7 - 15.4 g/dL HCT 26.1 (L) 35.8 - 46.3 % MCV 93.2 79.6 - 97.8 FL  
 MCH 31.1 26.1 - 32.9 PG  
 MCHC 33.3 31.4 - 35.0 g/dL  
 RDW 14.4 11.9 - 14.6 % PLATELET 432 (L) 742 - 450 K/uL MPV 12.0 9.4 - 12.3 FL ABSOLUTE NRBC 0.00 0.0 - 0.2 K/uL  
 DF AUTOMATED NEUTROPHILS 69 43 - 78 % LYMPHOCYTES 21 13 - 44 % MONOCYTES 8 4.0 - 12.0 % EOSINOPHILS 1 0.5 - 7.8 % BASOPHILS 0 0.0 - 2.0 % IMMATURE GRANULOCYTES 0 0.0 - 5.0 %  
 ABS. NEUTROPHILS 4.9 1.7 - 8.2 K/UL  
 ABS. LYMPHOCYTES 1.5 0.5 - 4.6 K/UL  
 ABS. MONOCYTES 0.6 0.1 - 1.3 K/UL  
 ABS. EOSINOPHILS 0.1 0.0 - 0.8 K/UL  
 ABS.  BASOPHILS 0.0 0.0 - 0.2 K/UL  
 ABS. IMM. GRANS. 0.0 0.0 - 0.5 K/UL  
 
4/9:  CXR:  CHF/volume overload.  
 
Care Plan discussed with: Patient and Nurse Signed By: Anna Grace NP April 13, 2021

## 2021-04-13 NOTE — PROGRESS NOTES
Patient resting quietly in bed. Alert and oriented x4. Respirations even and unlabored on room air. No s/sx of distress noted and denies pain. Safety measures in place, call light in reach.

## 2021-04-13 NOTE — PROGRESS NOTES
Patient complaining of nausea and actively vomiting at this time. Zofran 4 mg IV given at this time.

## 2021-04-13 NOTE — CONSULTS
[address], [place]. [facility] FAX: [phone] Vascular Surgery Consult Subjective:  
  
Ana Coombs is a 57 y.o. female who presented to the ER with complaints of shortness of breath and lower extremity edema. She was hospitalized last month at [facility] for syncopal episodes. She is right-hand dominant. We are asked to evaluate her for long-term dialysis access. She currently has a right chest catheter in place and wants Monday, Wednesday and Friday. Vein mapping has been completed-awaiting final read. PMH: Diabetes, hypertension, GERD, ESRD on HD MWF, CHF and anemia. Past Medical History:  
Diagnosis Date  Asthma  Diabetes ([facility] [address]   
 niddm  Diabetes mellitus type 2, controlled ([facility] [address] 4/9/2021  Hypertension Past Surgical History:  
Procedure Laterality Date  IR INSERT TUNL CVC W/O PORT OVER 5 YR  4/12/2021 Family History Problem Relation Age of Onset  No Known Problems Mother  No Known Problems Father Social History Socioeconomic History  Marital status: SINGLE Spouse name: Not on file  Number of children: Not on file  Years of education: Not on file  Highest education level: Not on file Tobacco Use  Smoking status: Current Every Day Smoker Packs/day: 1.00 Years: 32.00 Pack years: 32.00 Substance and Sexual Activity  Alcohol use: No  
 Drug use: No  
  
Current Facility-Administered Medications Medication Dose Route Frequency  tuberculin injection 5 Units  5 Units IntraDERMal ONCE  
 famotidine (PEPCID) tablet 20 mg  20 mg Oral DAILY  ferrous sulfate tablet 650 mg  2 Tab Oral BID WITH MEALS  
 HYDROcodone-acetaminophen (NORCO) 5-325 mg per tablet 1 Tab  1 Tab Oral Q4H PRN  
 0.9% sodium chloride infusion 250 mL  250 mL IntraVENous PRN  
 insulin lispro (HUMALOG) injection   SubCUTAneous AC&HS  nicotine (NICODERM CQ) 21 mg/24 hr patch 1 Patch  1 Patch TransDERmal Q24H  
 atorvastatin (LIPITOR) tablet 40 mg  40 mg Oral DAILY  carvediloL (COREG) tablet 25 mg  25 mg Oral BID WITH MEALS  
 NIFEdipine ER (PROCARDIA XL) tablet 60 mg  60 mg Oral DAILY  sodium chloride (NS) flush 5-40 mL  5-40 mL IntraVENous Q8H  
 sodium chloride (NS) flush 5-40 mL  5-40 mL IntraVENous PRN  
 acetaminophen (TYLENOL) tablet 650 mg  650 mg Oral Q6H PRN Or  
 acetaminophen (TYLENOL) suppository 650 mg  650 mg Rectal Q6H PRN  polyethylene glycol (MIRALAX) packet 17 g  17 g Oral DAILY PRN  promethazine (PHENERGAN) tablet 12.5 mg  12.5 mg Oral Q6H PRN Or  
 ondansetron (ZOFRAN) injection 4 mg  4 mg IntraVENous Q6H PRN  
 heparin (porcine) injection 5,000 Units  5,000 Units SubCUTAneous Q8H  
 [Held by provider] furosemide (LASIX) injection 60 mg  60 mg IntraVENous BID No Known Allergies Review of Systems: A comprehensive review of systems was negative except for that written in the History of Present Illness. Objective:  
 
Patient Vitals for the past 8 hrs: 
 BP Temp Pulse Resp SpO2 Weight 21 0652 (!) 172/90 98 °F (36.7 °C) 86 20 97 %   
21 0558      136 lb 12.8 oz (62.1 kg) 21 0326 (!) 167/77 98.2 °F (36.8 °C) 88 17 92 %  Temp (24hrs), Av.1 °F (36.7 °C), Min:97 °F (36.1 °C), Max:98.5 °F (36.9 °C) Physical Exam: 
GENERAL: alert, cooperative, no distress, appears stated age, LUNG: clear to auscultation bilaterally, HEART: regular rate and rhythm, S1, S2 normal, no murmur, click, rub or gallop, EXTREMITIES: palpable radial pulses bilaterally. IV to right AC fossa. patient is right hand dominant Assessment/Plan:   
 
Patient is a 59-year-old female with new ESRD who started dialysis recently via a right chest catheter. She is right-hand dominant. Vein mapping has completed, will await final read to discuss final surgical planning.  I did discuss with her the option of an arteriovenous fistula vs arteriovenous graft. Dr. Pee Mojica will make further recommendations later day. Signed By: Marci Richards NP April 13, 2021 Elements of this note have been dictated using speech recognition software. As a result, errors of speech recognition may have occurred.

## 2021-04-13 NOTE — PROGRESS NOTES
Yesenia Leonard Admission Date: 4/9/2021 Renal Daily Progress Note: 4/13/2021 The patient's chart is reviewed and the patient is discussed with the staff. Follow up ESRD Subjective:  
Patient seen and examined on HD-#2, dialyzing via right  Qb, UF 2500 tolerating dialysis, no new complaints. Labs and chart reviewed ROS: 
General: no fever/chills, appetite ok CV: no CP Lung: + SOB- better, no cough GI: no N/V/D 
: no dysuria Ext: no edema Current Facility-Administered Medications Medication Dose Route Frequency  tuberculin injection 5 Units  5 Units IntraDERMal ONCE  
 famotidine (PEPCID) tablet 20 mg  20 mg Oral DAILY  ferrous sulfate tablet 650 mg  2 Tab Oral BID WITH MEALS  
 HYDROcodone-acetaminophen (NORCO) 5-325 mg per tablet 1 Tab  1 Tab Oral Q4H PRN  
 0.9% sodium chloride infusion 250 mL  250 mL IntraVENous PRN  
 insulin lispro (HUMALOG) injection   SubCUTAneous AC&HS  nicotine (NICODERM CQ) 21 mg/24 hr patch 1 Patch  1 Patch TransDERmal Q24H  
 atorvastatin (LIPITOR) tablet 40 mg  40 mg Oral DAILY  carvediloL (COREG) tablet 25 mg  25 mg Oral BID WITH MEALS  
 NIFEdipine ER (PROCARDIA XL) tablet 60 mg  60 mg Oral DAILY  sodium chloride (NS) flush 5-40 mL  5-40 mL IntraVENous Q8H  
 sodium chloride (NS) flush 5-40 mL  5-40 mL IntraVENous PRN  
 acetaminophen (TYLENOL) tablet 650 mg  650 mg Oral Q6H PRN Or  
 acetaminophen (TYLENOL) suppository 650 mg  650 mg Rectal Q6H PRN  polyethylene glycol (MIRALAX) packet 17 g  17 g Oral DAILY PRN  promethazine (PHENERGAN) tablet 12.5 mg  12.5 mg Oral Q6H PRN Or  
 ondansetron (ZOFRAN) injection 4 mg  4 mg IntraVENous Q6H PRN  
 heparin (porcine) injection 5,000 Units  5,000 Units SubCUTAneous Q8H  
 [Held by provider] furosemide (LASIX) injection 60 mg  60 mg IntraVENous BID Objective:  
 
Vitals:  
 04/13/21 0558 04/13/21 0652 04/13/21 1145 04/13/21 1200 BP: Eligoi Acosta ) 172/90 (!) 176/99 (!) 171/99 Pulse:  86 86 84 Resp:  20 Temp:  98 °F (36.7 °C) SpO2:  97% Weight: 62.1 kg (136 lb 12.8 oz) Height:      
 
Intake and Output:  
04/11 1901 - 04/13 0700 In: 461 [P.O.:461] Out: 4420 [[id number]] No intake/output data recorded. Physical Exam:  
Constitutional:  the patient is well developed and in no acute distress, alert and oriented HEENT:  Sclera clear, pupils equal, oral mucosa moist 
Lungs: clear bilaterally Cardiovascular:  Regular rate, S1, S2, no Rub Abd/GI: soft and non-tender; with positive bowel sounds. Ext: warm without cyanosis. There is no lower leg edema. Skin:  no jaundice or rashes Neuro: no gross neuro deficits Psychiatric: Calm. Access: right TCC- intact LAB Recent Labs 04/13/21 
0630 04/12/21 
0502 04/11/21 
1614 04/11/21 
5187 WBC 7.0 6.8  --  5.4 HGB 8.7* 7.8* 8.8* 6.8* HCT 26.1* 23.5* 27.1* 21.1*  
* 133*  --  124* Recent Labs 04/13/21 
0630 04/12/21 
0502 04/11/21 
3015  138 138  
K 3.8 4.0 4.0  
 105 107 CO2 26 24 23 * 134* 176* BUN 46* 63* 59* CREA 6.53* 8.49* 8.25* MG  --  1.8 1.9 PHOS  --  5.8*  --   
ALB  --  2.9*  -- No results for input(s): PH, PCO2, PO2, HCO3 in the last 72 hours. Assessment:  (Medical Decision Making) Hospital Problems  Date Reviewed: 4/9/2021 Codes Class Noted POA * (Principal) Acute hypoxemic respiratory failure (HCC) ICD-10-CM: J96.01 
ICD-9-CM: 518.81  4/9/2021 Unknown  
   
 CKD (chronic kidney disease) ICD-10-CM: N18.9 ICD-9-CM: 585.9  4/9/2021 Unknown Hypertension ICD-10-CM: I10 
ICD-9-CM: 401.9  4/9/2021 Unknown Diabetes mellitus type 2, controlled ([facility] ICD-10-CM: E11.9 ICD-9-CM: 250.00  4/9/2021 Unknown Plan:  (Medical Decision Making) 1. CKD V/ESRD- TCC and 1st HD 4/12 
-seen on HD-#2 tolerating dialysis HD again tomorrow - obtain bilat upper extremity vein mapping and consult vascular for access placement -appreciate CW for outpt HD slot pt lives closest to [facility] 2. HTN continue antihypertensives, UF with HD 3. Anemia- s/p PRBC, low Fe studies, on Fe 4. DM SSI Adin Mendoza, NP

## 2021-04-13 NOTE — PROGRESS NOTES
MSN, CM:  Spoke with patient this AM about discharge planning. Patient lives with her daughter Josef Brock" in a 1st floor apartment. Patient has two other children \"Cynthia and Jose E\" that live locally. Patient is independent with all ADL's and requires no equipment for ambulation. Patient denies any home oxygen, HH or rehab in the past.  Patient confirms PCP is Dr. Marcela Simmonds at [facility] and her daughter drives her to all appointments. Patient will be a new HD patient with a clinic of [address] Labs drawn; when resulted paperwork will be faxed. Patient denies any discharge needs at this time. Case Management will continue to follow for any discharge needs that may arise. Care Management Interventions Mode of Transport at Discharge: Other (see comment)(family to transport) Transition of Care Consult (CM Consult): Discharge Planning Freedom of Choice List was Provided with Basic Dialogue that Supports the Patient's Individualized Plan of Care/Goals, Treatment Preferences and Shares the Quality Data Associated with the Providers?: Yes Discharge Location Discharge Placement: Home

## 2021-04-13 NOTE — PROGRESS NOTES
Physician Progress Note PATIENTPalynda Bonilla 
CSN #:                  [id number] :                       1960 ADMIT DATE:       2021 11:16 AM 
DISCH DATE: 
[facility] 
PROVIDER #:        Lieutenant Ingrid LORENZ 
 
 
 
 
QUERY TEXT: 
 
Pt admitted with Acute Hypoxemic Respiratory Failure. Pt noted to have HTN, Chronic combined Systolic and Diastolic CHF, and Stage 5 CKD. . If possible, please document in progress notes and discharge summary the relationship, if any, between HTN and Chronic combined Systolic and Diastolic CHF. The medical record reflects the following: 
Risk Factors: Hx: CKD4-5, DM2, HTN, Chronic combined Systolic and Diastolic CHF, smoker, 
Clinical Indicators: Dx Hypertensive Urgency, 180/100---199/95---190/105---209/99---212/104, BNP 48,000, 
 Consult Note: She presents today with a 3 day history of SOB, orthopnea and pedal edema. Admitted today with volume overload with hypoxia O2 saturation 87% at rest on RA. She is being treated with IV Lasix. Renal consult requested for CKD stage 5; 
 HP Bibasilar rales  Chest xray: CHF/volume overload.  PN TTE 3/14/21:  EF 45-50%, dilated LV, Grade 2 DD Treatment: Monitoring, Nephrology consult, Coreg 25mg BID, IV Lasix 60mg IVP Q 12 hrs, Procardia 60 mg PO QD, Strict I&Os, Thank you, Vonnie Chan RN,C BSN Clinical  
[email or web address] 
Options provided: 
-- Chronic combined Systolic and Diastolic CHF due to HTN 
-- Chronic combined Systolic and Diastolic CHF  unrelated to HTN 
-- Other - I will add my own diagnosis -- Disagree - Not applicable / Not valid -- Disagree - Clinically unable to determine / Unknown 
-- Refer to Clinical Documentation Reviewer PROVIDER RESPONSE TEXT: 
 
Secondary to progressive CKD Query created by:  Prem Guillermo on 2021 8:31 PM 
 
 
Electronically signed by:  Lieutenant Ingrid LORENZ 2021 7:01 AM

## 2021-04-13 NOTE — DIALYSIS
TRANSFER OUT- DIALYSIS Hemodialysis treatment completed. Patient's b/p dropped significantly during dialysis and symptomatic. Unable to tolerate any fluid removal.  
 
Patient alert. B/P after rinseback 130/80, HR 87 Flushed both ports with 10 mL of NS.  CVC dressing clean, dry, and intact, tego caps intact, bilateral lumens wrapped with 4x4 gauze. Patient to [phone] after dialysis.

## 2021-04-14 NOTE — ACP (ADVANCE CARE PLANNING)
Received consult from Heather Escalona for advance care paperwork. Reviewed patient's chart and determined that pt is able to make health care decisions. Pt was out of room (at dialysis). No visitors present. Left a copy of the [facility] form with a note indicating that pt could call  at [phone] for assistance or with questions. Chaplains to follow-up, as needed. Jean Calles MDiv, [facility]

## 2021-04-14 NOTE — PROGRESS NOTES
Received consult from Heather Escalona for advance care paperwork. Reviewed patient's chart and determined that pt is able to make health care decisions. Pt was out of room (at dialysis). No visitors present. Left a copy of the [facility] form with a note indicating that pt could call  at [phone] for assistance or with questions. Chaplains to follow-up, as needed. Edgard Rocha MDiv, [facility]

## 2021-04-14 NOTE — PROGRESS NOTES
ACUTE PHYSICAL THERAPY GOALS: 
(Developed with and agreed upon by patient and/or caregiver. ) LTG: 
(1.)Ms. Ada Barbosa will move from supine to sit and sit to supine, scoot up and down and roll side to side in bed INDEPENDENTLY with bed flat within 7 treatment day(s). (2.)Ms. Ada Barbosa will transfer from bed to chair and chair to bed INDEPENDENTLY within 7 treatment day(s). (3.)Ms. Ada Barbosa will ambulate INDEPENDENTLY for 500+ feet intact standing balance within 7 treatment day(s). PHYSICAL THERAPY: Daily Note, Discharge and AM Treatment Day # 2 Heather Gentile is a 57 y.o. female PRIMARY DIAGNOSIS: Acute hypoxemic respiratory failure ([address] Acute hypoxemic respiratory failure ([address] [J96.01] Procedure(s) (LRB): LEFT BRACHIO CEPHALIC ARTERIO VENOUS FISTULA PLACEMENT/ 814 (Left) ASSESSMENT:  
 
REHAB RECOMMENDATIONS: CURRENT LEVEL OF FUNCTION: 
(Most Recently Demonstrated) Recommendation to date pending progress: 
Setting:  No further skilled therapy Equipment:  
 None Bed Mobility: 
 Independent Sit to Stand:  Independent Transfers:  Independent Gait/Mobility: 
 Independent ASSESSMENT: 
Ms. Ada Barbosa demonstrates improved balance and activity tolerance today. Ambulating 500 ft in hallway independently without DME use. Accelerated pace with no major deficits noted or assistance needed. Pt has already been moving in room independently. Plans to dc home; no continued therapy needs. Will dc from acute PT caseload SUBJECTIVE:  
Ms. Ada Barbosa states, \"I'm ready to go home\" SOCIAL HISTORY/ LIVING ENVIRONMENT: Lives with daughter in single story, first floor apartment. Fully independent without DME use Home Environment: Apartment # Steps to Enter: 0 One/Two Story Residence: One story Living Alone: No 
Support Systems: Child(chichi) OBJECTIVE:  
 
PAIN: VITAL SIGNS: LINES/DRAINS:  
Pre Treatment: Pain Screen Pain Scale 1: Numeric (0 - 10) Pain Intensity 1: 0 Post Treatment: 0/10 Vital Signs O2 Device: None (Room air) none O2 Device: None (Room air) MOBILITY: I Mod I S SBA CGA Min Mod Max Total  NT x2 Comments:  
Bed Mobility Rolling [x] [] [] [] [] [] [] [] [] [] [] Supine to Sit [x] [] [] [] [] [] [] [] [] [] [] Scooting [x] [] [] [] [] [] [] [] [] [] [] Sit to Supine [x] [] [] [] [] [] [] [] [] [] []   
Transfers Sit to Stand [x] [] [] [] [] [] [] [] [] [] [] Bed to Chair [x] [] [] [] [] [] [] [] [] [] []   
Stand to Sit [x] [] [] [] [] [] [] [] [] [] [] I=Independent, Mod I=Modified Independent, S=Supervision, SBA=Standby Assistance, CGA=Contact Guard Assistance,  
Min=Minimal Assistance, Mod=Moderate Assistance, Max=Maximal Assistance, Total=Total Assistance, NT=Not Tested BALANCE: Good Fair+ Fair Fair- Poor NT Comments Sitting Static [x] [] [] [] [] [] Sitting Dynamic [x] [] [] [] [] []   
         
Standing Static [x] [] [] [] [] []   
Standing Dynamic [x] [] [] [] [] [] GAIT: I Mod I S SBA CGA Min Mod Max Total  NT x2 Comments:  
Level of Assistance [x] [] [] [] [] [] [] [] [] [] [] Distance 500 ft   
DME None Gait Quality No deficits Weightbearing Status N/A I=Independent, Mod I=Modified Independent, S=Supervision, SBA=Standby Assistance, CGA=Contact Guard Assistance,  
Min=Minimal Assistance, Mod=Moderate Assistance, Max=Maximal Assistance, Total=Total Assistance, NT=Not Tested PLAN:  
FREQUENCY/DURATION: PT Plan of Care: 2 times/week for duration of hospital stay or until stated goals are met, whichever comes first. 
TREATMENT:  
 
TREATMENT:  
($$ Therapeutic Activity: 8-22 mins    ) Therapeutic Activity (10 Minutes): Therapeutic activity included Rolling, Supine to Sit, Sit to Supine, Scooting, Transfer Training, Ambulation on level ground, Sitting balance  and Standing balance to improve functional Mobility, Strength, Activity tolerance and balance.  
 
TREATMENT GRID: 
N/A 
 
AFTER TREATMENT POSITION/PRECAUTIONS: Bed, Needs within reach and RN notified INTERDISCIPLINARY COLLABORATION: 
RN/PCT and PT/PTA TOTAL TREATMENT DURATION: 
PT Patient Time In/Time Out Time In: 2709 Time Out: 9866 Benji Brown DPT

## 2021-04-14 NOTE — PROGRESS NOTES
MSN, CM:  Information faxed to [address] Renal for new HD slot at [phone] [facility]). COVID test ordered also. Case Management will continue to follow.

## 2021-04-14 NOTE — PROGRESS NOTES
No acute events overnight. Patient resting quietly in bed, watching TV. Respirations even and unlabored on room air. No s/sx of distress. No needs expressed at this time. Safety measures in place, call light in reach. Preparing to give BSR to oncoming RN upon arrival to floor.

## 2021-04-14 NOTE — DIALYSIS
TRANSFER OUT- DIALYSIS Hemodialysis treatment completed without complications. Catheter with improved performance today after activase dwell. Still ran reversed but able to achieve max BFR of 350 without difficulty. Patient a/ox3 and  /99,   P 81   
 
 2 Kg removed. Flushed both ports with 10 mL of NS.  CVC dressing clean, dry, and intact, tego caps intact, bilateral lumens wrapped with 4x4 gauze. Patient to [phone] after dialysis.

## 2021-04-14 NOTE — PROGRESS NOTES
Patient seen and examined. Patient initially scheduled for left brachiocephalic fistula placed tomorrow. Will  patient surgery and reschedule for next week sometime. From vascular standpoint can be discharged home will give her a new surgical date. No IVs or blood draws in the left arm. Pk Agrawal

## 2021-04-14 NOTE — PROGRESS NOTES
Patient  is scheduled for next Wednesday the 21st for left brachiocephalic fistula placement. This can be done as an outpatient. Mayte Levi

## 2021-04-14 NOTE — DIALYSIS
TRANSFER IN - DIALYSIS Received patient in dialysis unit  from [facility] (unit) for ordered procedure. Consent verified for renal replacement therapy. Patient alert and vital signs stable. /102 P83 Prior to treatment activase used in CVC due to difficulties running yesterday. Hemodialysis initiated using right chest catheter. Activase removed. Aspirated and flushed both ports without difficulty. Dressing clean, dry and intact. Machine settings per MD order. Will monitor during treatment.

## 2021-04-14 NOTE — PROGRESS NOTES
[facility] Hospitalist Progress Note Name:  Bessy Boles  Age:61 y.o. Sex:female :  1960 MRN:  [id number] Admit Date:  2021 Reason for Admission: 
Acute hypoxemic respiratory failure ([facility] [J96.01] Hospital Course/Interval history:  
Patient presented to ER  with 72 hour history of progressively worsening SOB, orthopnea, LE edema, and back pain.  No known pulmonary history.  Constipated with no BM x 4 days.  ESRD with referral 21 for vein mapping for HD. Hypertensive to 180/100 on arrival.  CXR with volume overload.  Given labetalol and lasix.  Oxygen sat on arrival 87% on room air, 2 liters oxygen with improvement to 97%.  Nephrology in. Creatinine: 7.7, not much change from last month.  Hospitalized last month at [facility] for hypoglycemia. Assessment & Plan ACUTE HYPOXEMIC RESPIRATORY FAILURE:  DUE TO VOLUME OVERLOAD SECONDARY TO ESRD/CKD V. 
            :  Nephro holding 60 mg IV lasix bid,  
            Strict I+O 
            Nephrology on board, appreciate             : Right IJ temp CVC HD temp cath placed. :  HD #1  
            :  HD #2, symptomatic hypotension, unable to withdraw fluid per note  
            Blood pressure control             Continue oxygen, titrate as needed: 4/10:  Stable on room air -resolved, no SOB on RA 
  
ACUTE VOLUME OVERLOAD:  BNP ON ADMISSION:  48,000 
            TTE 3/14/21:  EF 45-50%, dilated LV, Grade 2 DD 
            RKK not order another echocardiogram  
            :  Continues to diurese slowly HD #1 
            :  HD #2, did not tolerate as noted above  
-resolved, no SOB on RA, HD tomorrow -will need to f/u with vascular surgery for a brachiocephalic fistula on  
  
ESRD:  TEMP IJ HD CVC PLACEMENT  WITH #1 HD             
            Nephrology on board, appreciate  
  
HYPERTENSION WITH HYPERTENSIVE URGENCY:   
            Continue home meds 04/14 BP elevated over the past 24 hours, will add po hydralazine 10 mg TID, current 180/99  
  
NIDDM II:  A1C 7.0  
            Has not been taking meds in a few weeks:  On po at home  
            Routine SSI ac tid and HS 
04/14 
 -24 hrs 147/388/229, may need to start lantus, review tomorrow. Has had previous problems with hypoglycemia CHRONIC SYSTOLIC AND DIASTOLIC HEART FAILURE:   
            Continue coreg, NEPHRO HOLDING LASIX 
  
32 PACK YEAR SMOKING HISTORY:  Nicotine patch prn 
            Encourage cessation 
  
ANEMIA OF CHRONIC DISEASE:  HGB ADM: 8.0.  DUE TO RENAL DISEASE 
            No current or past bleeding  
            Follow 04/14-Anemia workup Diet:  DIET RENAL 
DVT PPx:SCDs Code status: Full Code Disposition/Expected LOS: Possible home tomorrow after HD Subjective (04/14/21): Patient examined and seen at bedside. Stated she feels good. No complaints. Has been tolerating HD, without any problems. Review of Systems: 14 point review of systems is otherwise negative with the exception of the elements mentioned above. Objective:  
 
Patient Vitals for the past 24 hrs: 
 Temp Pulse Resp BP SpO2  
04/14/21 1300  80  (!) 174/112   
04/14/21 1230  77  (!) 180/94   
04/14/21 1200  80  (!) 185/101   
04/14/21 1130  80  (!) 180/102   
04/14/21 1100  79  (!) 181/97   
04/14/21 1031  83  (!) 184/102   
04/14/21 0752 99.2 °F (37.3 °C) 82 22 (!) 168/91 97 % 04/14/21 0415 98 °F (36.7 °C) 85 20 (!) 149/75 94 % 04/14/21 0009 98.1 °F (36.7 °C) 85 20 135/68 96 % 04/13/21 2001 98.1 °F (36.7 °C) 87 20 130/71 93 % 04/13/21 1636 98.6 °F (37 °C) 97 20 (!) 145/70 98 % 04/13/21 1451  87  130/80   
04/13/21 1430  87  (!) 145/67   
04/13/21 1400  85  (!) 153/79  Oxygen Therapy O2 Sat (%): 97 % (04/14/21 0752) Pulse via Oximetry: 78 beats per minute (04/12/21 1301) O2 Device: None (Room air) (04/14/21 1009) O2 Flow Rate (L/min): 5 l/min (04/12/21 1237) ETCO2 (mmHg): 44 mmHg (04/12/21 1252) Body mass index is 24.62 kg/m². Physical Exam:  
General: No acute distress, speaking in full sentences, no use of accessory muscles Lungs: Clear to auscultation bilaterally CV:Regular rate and rhythm with normal S1 and S2 Abdomen: Soft, nontender, nondistended, normoactive bowel sounds Extremities: No cyanosis clubbing or edema Neuro:  Nonfocal, A&O x3  
Psych: Normal affect Data Review: 
I have reviewed all labs, meds, and studies from the last 24 hours: 
 
Labs: 
 
Recent Results (from the past 24 hour(s)) GLUCOSE, POC Collection Time: 04/13/21  3:26 PM  
Result Value Ref Range Glucose (POC) 147 (H) 65 - 100 mg/dL Performed by [facility] PLEASE READ & DOCUMENT PPD TEST IN 24 HRS Collection Time: 04/13/21  4:52 PM  
Result Value Ref Range PPD Negative Negative  
 mm Induration 0 0 - 5 mm GLUCOSE, POC Collection Time: 04/13/21  8:41 PM  
Result Value Ref Range Glucose (POC) 388 (H) 65 - 100 mg/dL Performed by [facility] GLUCOSE, POC Collection Time: 04/14/21  5:39 AM  
Result Value Ref Range Glucose (POC) 229 (H) 65 - 100 mg/dL Performed by [facility] METABOLIC PANEL, BASIC Collection Time: 04/14/21  6:28 AM  
Result Value Ref Range Sodium 140 136 - 145 mmol/L Potassium 3.6 3.5 - 5.1 mmol/L Chloride 105 98 - 107 mmol/L  
 CO2 29 21 - 32 mmol/L Anion gap 6 (L) 7 - 16 mmol/L Glucose 196 (H) 65 - 100 mg/dL BUN 27 (H) 8 - 23 MG/DL Creatinine 4.76 (H) 0.6 - 1.0 MG/DL  
 GFR est AA 12 (L) >60 ml/min/1.73m2 GFR est non-AA 10 (L) >60 ml/min/1.73m2 Calcium 7.9 (L) 8.3 - 10.4 MG/DL  
CBC WITH AUTOMATED DIFF Collection Time: 04/14/21  6:28 AM  
Result Value Ref Range WBC 6.5 4.3 - 11.1 K/uL  
 RBC 2.58 (L) 4.05 - 5.2 M/uL HGB 8.1 (L) 11.7 - 15.4 g/dL HCT 25.1 (L) 35.8 - 46.3 % MCV 97.3 79.6 - 97.8 FL  
 MCH 31.4 26.1 - 32.9 PG  
 MCHC 32.3 31.4 - 35.0 g/dL  RDW 14.3 11.9 - 14.6 % PLATELET 902 (L) 538 - 450 K/uL MPV 11.7 9.4 - 12.3 FL ABSOLUTE NRBC 0.00 0.0 - 0.2 K/uL  
 DF AUTOMATED NEUTROPHILS 61 43 - 78 % LYMPHOCYTES 26 13 - 44 % MONOCYTES 11 4.0 - 12.0 % EOSINOPHILS 2 0.5 - 7.8 % BASOPHILS 0 0.0 - 2.0 % IMMATURE GRANULOCYTES 0 0.0 - 5.0 %  
 ABS. NEUTROPHILS 4.0 1.7 - 8.2 K/UL  
 ABS. LYMPHOCYTES 1.7 0.5 - 4.6 K/UL  
 ABS. MONOCYTES 0.7 0.1 - 1.3 K/UL  
 ABS. EOSINOPHILS 0.1 0.0 - 0.8 K/UL  
 ABS. BASOPHILS 0.0 0.0 - 0.2 K/UL  
 ABS. IMM. GRANS. 0.0 0.0 - 0.5 K/UL All Micro Results Procedure Component Value Units Date/Time COVID-19 RAPID TEST [[id number]] Order Status: Sent CULTURE, URINE [[id number]] Collected: 04/10/21 1957 Order Status: Completed Specimen: Urine from Clean catch Updated: 04/13/21 0787 Special Requests: NO SPECIAL REQUESTS Culture result:    
  >100,000 COLONIES/mL MIXED SKIN GREG ISOLATED THREE OR MORE TYPES OF ORGANISMS ARE PRESENT. THIS IS INDICATIVE OF CONTAMINATION DUE TO IMPROPER COLLECTION TECHNIQUE. PLEASE REPEAT COLLECTION UNLESS PATIENT HAS STARTED ANTIBIOTIC TREATMENT. EKG Results Procedure 720 Value Units Date/Time EKG [[id number]] Collected: 04/09/21 1101 Order Status: Completed Updated: 04/09/21 1738 Ventricular Rate 97 BPM   
  Atrial Rate 97 BPM   
  P-R Interval 158 ms QRS Duration 72 ms Q-T Interval 370 ms QTC Calculation (Bezet) 469 ms Calculated P Axis 84 degrees Calculated R Axis 21 degrees Calculated T Axis 135 degrees Diagnosis --  
  Normal sinus rhythm Nonspecific T wave abnormality Abnormal ECG When compared with ECG of 25-MAR-2011 00:27, 
Nonspecific T wave abnormality now evident in Lateral leads Confirmed by Brianna Victor MD (), Deloris Velasquez ([id number]) on 4/9/2021 5:38:44 PM 
  
  
 
 
Other Studies: No results found.  
 
Current Meds:  
Current Facility-Administered Medications Medication Dose Route Frequency  lisinopriL (PRINIVIL, ZESTRIL) tablet 10 mg  10 mg Oral DAILY  [START ON 4/15/2021] NIFEdipine ER (PROCARDIA XL) tablet 90 mg  90 mg Oral DAILY  famotidine (PEPCID) tablet 20 mg  20 mg Oral DAILY  ferrous sulfate tablet 650 mg  2 Tab Oral BID WITH MEALS  
 HYDROcodone-acetaminophen (NORCO) 5-325 mg per tablet 1 Tab  1 Tab Oral Q4H PRN  
 0.9% sodium chloride infusion 250 mL  250 mL IntraVENous PRN  
 insulin lispro (HUMALOG) injection   SubCUTAneous AC&HS  nicotine (NICODERM CQ) 21 mg/24 hr patch 1 Patch  1 Patch TransDERmal Q24H  
 atorvastatin (LIPITOR) tablet 40 mg  40 mg Oral DAILY  carvediloL (COREG) tablet 25 mg  25 mg Oral BID WITH MEALS  sodium chloride (NS) flush 5-40 mL  5-40 mL IntraVENous Q8H  
 sodium chloride (NS) flush 5-40 mL  5-40 mL IntraVENous PRN  
 acetaminophen (TYLENOL) tablet 650 mg  650 mg Oral Q6H PRN Or  
 acetaminophen (TYLENOL) suppository 650 mg  650 mg Rectal Q6H PRN  polyethylene glycol (MIRALAX) packet 17 g  17 g Oral DAILY PRN  promethazine (PHENERGAN) tablet 12.5 mg  12.5 mg Oral Q6H PRN Or  
 ondansetron (ZOFRAN) injection 4 mg  4 mg IntraVENous Q6H PRN  
 heparin (porcine) injection 5,000 Units  5,000 Units SubCUTAneous Q8H  
 [Held by provider] furosemide (LASIX) injection 60 mg  60 mg IntraVENous BID Problem List: 
Hospital Problems as of 4/14/2021 Date Reviewed: 4/9/2021 Codes Class Noted - Resolved POA * (Principal) Acute hypoxemic respiratory failure (HCC) ICD-10-CM: J96.01 
ICD-9-CM: 518.81  4/9/2021 - Present Unknown  
   
 CKD (chronic kidney disease) ICD-10-CM: N18.9 ICD-9-CM: 585.9  4/9/2021 - Present Unknown Hypertension ICD-10-CM: I10 
ICD-9-CM: 401.9  4/9/2021 - Present Unknown Diabetes mellitus type 2, controlled ([facility] ICD-10-CM: E11.9 ICD-9-CM: 250.00  4/9/2021 - Present Unknown Patient discussed with care team and Dr. Shannan Diallo By: Yesy Rodriguez, MARGARET [address] [facility]  April 14, 2021  5:15 PM

## 2021-04-14 NOTE — PROGRESS NOTES
Heather Gentile Admission Date: 4/9/2021 Renal Daily Progress Note: 4/14/2021 The patient's chart is reviewed and the patient is discussed with the staff. Follow up ESRD Subjective:  
Patient seen and examined on HD-#3, dialyzing via right  Qb, UF almost 3Kg  tolerating dialysis, no new complaints. Labs and chart reviewed ROS: 
General: no fever/chills, appetite ok CV: no CP Lung: + SOB- better, no cough GI: no N/V/D 
: no dysuria Ext: no edema Current Facility-Administered Medications Medication Dose Route Frequency  famotidine (PEPCID) tablet 20 mg  20 mg Oral DAILY  ferrous sulfate tablet 650 mg  2 Tab Oral BID WITH MEALS  
 HYDROcodone-acetaminophen (NORCO) 5-325 mg per tablet 1 Tab  1 Tab Oral Q4H PRN  
 0.9% sodium chloride infusion 250 mL  250 mL IntraVENous PRN  
 insulin lispro (HUMALOG) injection   SubCUTAneous AC&HS  nicotine (NICODERM CQ) 21 mg/24 hr patch 1 Patch  1 Patch TransDERmal Q24H  
 atorvastatin (LIPITOR) tablet 40 mg  40 mg Oral DAILY  carvediloL (COREG) tablet 25 mg  25 mg Oral BID WITH MEALS  
 NIFEdipine ER (PROCARDIA XL) tablet 60 mg  60 mg Oral DAILY  sodium chloride (NS) flush 5-40 mL  5-40 mL IntraVENous Q8H  
 sodium chloride (NS) flush 5-40 mL  5-40 mL IntraVENous PRN  
 acetaminophen (TYLENOL) tablet 650 mg  650 mg Oral Q6H PRN Or  
 acetaminophen (TYLENOL) suppository 650 mg  650 mg Rectal Q6H PRN  polyethylene glycol (MIRALAX) packet 17 g  17 g Oral DAILY PRN  promethazine (PHENERGAN) tablet 12.5 mg  12.5 mg Oral Q6H PRN Or  
 ondansetron (ZOFRAN) injection 4 mg  4 mg IntraVENous Q6H PRN  
 heparin (porcine) injection 5,000 Units  5,000 Units SubCUTAneous Q8H  
 [Held by provider] furosemide (LASIX) injection 60 mg  60 mg IntraVENous BID Objective:  
 
Vitals:  
 04/14/21 0424 04/14/21 0752 04/14/21 1031 04/14/21 1100 BP:  (!) 168/91 (!) 184/102 (!) 181/97 Pulse:  82 83 79  
Resp:  22 Temp:  99.2 °F (37.3 °C) SpO2:  97% Weight: 63 kg (139 lb) Height:      
 
Intake and Output:  
04/12 1901 - 04/14 0700 In: 341 [P.O.:341] Out: 900 [Urine:900] No intake/output data recorded. Physical Exam:  
Constitutional:  the patient is well developed and in no acute distress, alert and oriented HEENT:  Sclera clear, pupils equal, oral mucosa moist 
Lungs: clear bilaterally Cardiovascular:  Regular rate, S1, S2, no Rub Abd/GI: soft and non-tender; with positive bowel sounds. Ext: warm without cyanosis. There is no lower leg edema. Skin:  no jaundice or rashes Neuro: no gross neuro deficits Psychiatric: Calm. Access: right TCC- intact LAB Recent Labs 04/14/21 
0669 04/13/21 
0630 04/12/21 
0502 04/11/21 
1614 WBC 6.5 7.0 6.8  --   
HGB 8.1* 8.7* 7.8* 8.8* HCT 25.1* 26.1* 23.5* 27.1*  
* 139* 133*  --   
 
Recent Labs 04/14/21 
1943 04/13/21 
0630 04/12/21 
0502  139 138  
K 3.6 3.8 4.0  
 106 105 CO2 29 26 24 * 146* 134* BUN 27* 46* 63* CREA 4.76* 6.53* 8.49* MG  --   --  1.8 PHOS  --   --  5.8* ALB  --   --  2.9* No results for input(s): PH, PCO2, PO2, HCO3 in the last 72 hours. Assessment:  (Medical Decision Making) Hospital Problems  Date Reviewed: 4/9/2021 Codes Class Noted POA * (Principal) Acute hypoxemic respiratory failure (HCC) ICD-10-CM: J96.01 
ICD-9-CM: 518.81  4/9/2021 Unknown  
   
 CKD (chronic kidney disease) ICD-10-CM: N18.9 ICD-9-CM: 585.9  4/9/2021 Unknown Hypertension ICD-10-CM: I10 
ICD-9-CM: 401.9  4/9/2021 Unknown Diabetes mellitus type 2, [facility] ([facility] ICD-10-CM: E11.9 ICD-9-CM: 250.00  4/9/2021 Unknown Plan:  (Medical Decision Making) 1. CKD V/ESRD- TCC and 1st HD 4/12 
-seen on HD-#2 tolerating dialysis HD again tomorrow - would continue UF as tolerated given severe HTN 
- obtain bilat upper extremity vein mapping and consult vascular for access placement -appreciate CW for outpt HD slot pt lives closest to [facility] 2. HTN continue antihypertensives, UF with HD, meds adjusted 3. Anemia- s/p PRBC, low Fe studies, on Fe 4. DM SSI Shayy Velasquez MD

## 2021-04-15 NOTE — PROGRESS NOTES
Report received from [place], [facility]. Patient resting quietly in bed, A&Ox4. Respirations even and unlabored on room air. No s/sx of distress noted and patient denies pain at this time. Safety measures in place, call light in reach.

## 2021-04-15 NOTE — PROGRESS NOTES
Discharge orders noted, awaiting outpatient dialysis time slot information. Will go over discharge information once that is received.

## 2021-04-15 NOTE — PROGRESS NOTES
Dialysis called by RN to verify if patient was on list to run today prior to mediating with morning medications. Pt not on list for HD today per Dialysis

## 2021-04-15 NOTE — DISCHARGE INSTRUCTIONS
DISCHARGE SUMMARY from Nurse    PATIENT INSTRUCTIONS:    After general anesthesia or intravenous sedation, for 24 hours or while taking prescription Narcotics:  · Limit your activities  · Do not drive and operate hazardous machinery  · Do not make important personal or business decisions  · Do  not drink alcoholic beverages  · If you have not urinated within 8 hours after discharge, please contact your surgeon on call. Report the following to your surgeon:  · Excessive pain, swelling, redness or odor of or around the surgical area  · Temperature over 100.5  · Nausea and vomiting lasting longer than 4 hours or if unable to take medications  · Any signs of decreased circulation or nerve impairment to extremity: change in color, persistent  numbness, tingling, coldness or increase pain  · Any questions    What to do at Home:  Recommended activity: {discharge activity:71125}, ***    If you experience any of the following symptoms ***, please follow up with ***. *  Please give a list of your current medications to your Primary Care Provider. *  Please update this list whenever your medications are discontinued, doses are      changed, or new medications (including over-the-counter products) are added. *  Please carry medication information at all times in case of emergency situations. These are general instructions for a healthy lifestyle:    No smoking/ No tobacco products/ Avoid exposure to second hand smoke  Surgeon General's Warning:  Quitting smoking now greatly reduces serious risk to your health.     Obesity, smoking, and sedentary lifestyle greatly increases your risk for illness    A healthy diet, regular physical exercise & weight monitoring are important for maintaining a healthy lifestyle    You may be retaining fluid if you have a history of heart failure or if you experience any of the following symptoms:  Weight gain of 3 pounds or more overnight or 5 pounds in a week, increased swelling in our hands or feet or shortness of breath while lying flat in bed. Please call your doctor as soon as you notice any of these symptoms; do not wait until your next office visit. The discharge information has been reviewed with the {PATIENT PARENT GUARDIAN:10973}. The {PATIENT PARENT GUARDIAN:22073} verbalized understanding. Discharge medications reviewed with the {Dishcarge meds reviewed SGWV:72525} and appropriate educational materials and side effects teaching were provided. Patient Education        Learning About Fluid Overload  What is fluid overload? Fluid overload means that your body has too much water. The extra fluid in your body can raise your blood pressure and force your heart to work harder. It can also make it hard for you to breathe. Most of your body is made up of water. The body uses minerals like sodium and potassium to help organs such as your heart, kidneys, and liver balance how much water you need. For example, the heart pumps blood to move water around the body. And the kidneys work to get rid of the water that the body doesn't need. Health conditions like kidney disease, heart failure, and cirrhosis can cause fluid overload. Other things can cause extra fluid to build up. IV fluids, some medicines, too much salt (sodium) from food, and certain medical treatments can sometimes cause this fluid increase. What are the symptoms? Some of the most common symptoms are:  · Gaining weight over a short period of time. · Swelling in the ankles or legs. · Shortness of breath. How is it treated? The goal of treatment is to remove the extra fluid in your body. Your treatment will depend on the cause. Your doctor may:  · Give you medicines, such as diuretics (also called \"water pills\"). They help your body get rid of the extra fluid. · Recommend that you limit sodium. Follow-up care is a key part of your treatment and safety.  Be sure to make and go to all appointments, and call your doctor if you are having problems. It's also a good idea to know your test results and keep a list of the medicines you take. Where can you learn more? Go to http://www.gray.com/  Enter O110 in the search box to learn more about \"Learning About Fluid Overload. \"  Current as of: August 31, 2020               Content Version: 12.8  © 7547-3376 Healthwise, [facility]. Care instructions adapted under license by [employer] (which disclaims liability or warranty for this information). If you have questions about a medical condition or this instruction, always ask your healthcare professional. [address] any warranty or liability for your use of this information.          ___________________________________________________________________________________________________________________________________

## 2021-04-15 NOTE — PROGRESS NOTES
Raúl Navarrete Admission Date: 4/9/2021 Renal Daily Progress Note: 4/15/2021 The patient's chart is reviewed and the patient is discussed with the staff. Follow up ESRD Subjective:  
Patient seen and examined on rounds sitting up in bed, no new complaints, SOB improved. Labs and chart reviewed ROS: 
General: no fever/chills, appetite ok CV: no CP Lung:  No SOB, no cough GI: no N/V/D 
: no dysuria Ext: no edema Current Facility-Administered Medications Medication Dose Route Frequency  lisinopriL (PRINIVIL, ZESTRIL) tablet 20 mg  20 mg Oral BID  
 NIFEdipine ER (PROCARDIA XL) tablet 90 mg  90 mg Oral DAILY  famotidine (PEPCID) tablet 20 mg  20 mg Oral DAILY  ferrous sulfate tablet 650 mg  2 Tab Oral BID WITH MEALS  
 HYDROcodone-acetaminophen (NORCO) 5-325 mg per tablet 1 Tab  1 Tab Oral Q4H PRN  
 0.9% sodium chloride infusion 250 mL  250 mL IntraVENous PRN  
 insulin lispro (HUMALOG) injection   SubCUTAneous AC&HS  nicotine (NICODERM CQ) 21 mg/24 hr patch 1 Patch  1 Patch TransDERmal Q24H  
 atorvastatin (LIPITOR) tablet 40 mg  40 mg Oral DAILY  carvediloL (COREG) tablet 25 mg  25 mg Oral BID WITH MEALS  sodium chloride (NS) flush 5-40 mL  5-40 mL IntraVENous Q8H  
 sodium chloride (NS) flush 5-40 mL  5-40 mL IntraVENous PRN  
 acetaminophen (TYLENOL) tablet 650 mg  650 mg Oral Q6H PRN Or  
 acetaminophen (TYLENOL) suppository 650 mg  650 mg Rectal Q6H PRN  polyethylene glycol (MIRALAX) packet 17 g  17 g Oral DAILY PRN  promethazine (PHENERGAN) tablet 12.5 mg  12.5 mg Oral Q6H PRN Or  
 ondansetron (ZOFRAN) injection 4 mg  4 mg IntraVENous Q6H PRN  
 heparin (porcine) injection 5,000 Units  5,000 Units SubCUTAneous Q8H  
 [Held by provider] furosemide (LASIX) injection 60 mg  60 mg IntraVENous BID Objective:  
 
Vitals:  
 04/14/21 8528 04/14/21 2347 04/15/21 1296 04/15/21 9763 BP: (!) 142/74 (!) 156/81 (!) 165/87 (!) 172/92 Pulse: 83 80 77 81 Resp: 19 20 19 19 Temp: 99 °F (37.2 °C) 98.2 °F (36.8 °C) 98.8 °F (37.1 °C) 98.4 °F (36.9 °C) SpO2: 94% 95% 92% 99% Weight:   60.9 kg (134 lb 3.2 oz) Height:      
 
Intake and Output:  
04/13 1901 - 04/15 0700 In: 56 [P.O.:630] Out: 3000 [Urine:1000] No intake/output data recorded. Physical Exam:  
Constitutional:  the patient is well developed and in no acute distress, alert and oriented HEENT:  Sclera clear, pupils equal, oral mucosa moist 
Lungs: clear bilaterally Cardiovascular:  Regular rate, S1, S2, no Rub Abd/GI: soft and non-tender; with positive bowel sounds. Ext: warm without cyanosis. There is no lower leg edema. Skin:  no jaundice or rashes Neuro: no gross neuro deficits Psychiatric: Calm. Access: right TCC- intact LAB Recent Labs 04/15/21 
6188 04/14/21 
2162 04/13/21 
0630 WBC 7.5 6.5 7.0 HGB 8.6* 8.1* 8.7* HCT 27.2* 25.1* 26.1*  
* 114* 139* Recent Labs 04/15/21 
9741 04/14/21 
9447 04/13/21 
0630  140 139  
K 4.1 3.6 3.8  105 106 CO2 29 29 26 * 196* 146* BUN 15 27* 46* CREA 3.70* 4.76* 6.53* No results for input(s): PH, PCO2, PO2, HCO3 in the last 72 hours. Assessment:  (Medical Decision Making) Hospital Problems  Date Reviewed: 4/9/2021 Codes Class Noted POA * (Principal) Acute hypoxemic respiratory failure (HCC) ICD-10-CM: J96.01 
ICD-9-CM: 518.81  4/9/2021 Unknown  
   
 CKD (chronic kidney disease) ICD-10-CM: N18.9 ICD-9-CM: 585.9  4/9/2021 Unknown Hypertension ICD-10-CM: I10 
ICD-9-CM: 401.9  4/9/2021 Unknown Diabetes mellitus type 2, controlled ([facility] ICD-10-CM: E11.9 ICD-9-CM: 250.00  4/9/2021 Unknown Plan:  (Medical Decision Making) 1. CKD V/ESRD- TCC and 1st HD 4/12 
- plan for HD tomorrow outpt if d/c  
-outpt HD slot at [facility] 2. HTN- increase lisinopril, continue carvedilol and nifedipine 3. Anemia- s/p PRBC, low Fe studies, on Fe 4. DM SSI Flo Arechiga, NP

## 2021-04-15 NOTE — DISCHARGE SUMMARY
[facility] Hospitalist Discharge Summary Name:  Yesenia Leonard    Age:61 y.o. Sex:female :  1960 MRN:  [id number] Admitting Physician: Kelvni Keating MD Admit Date: 2021 11:16 AM  
Attending Physician: Brandon Watson MD 
Primary Care Physician: Unknown, Provider Discharge Physician: Emma Cash NP  Discharge date: 04/15/21 Discharged Condition: V/S WNL, Stable, No SOB Indication for Admission: Chief Complaint Patient presents with  Shortness of Breath Reasons for hospitalization: 
Hospital Problems as of 4/15/2021 Date Reviewed: 2021 Codes Class Noted - Resolved POA * (Principal) Acute hypoxemic respiratory failure (HCC) ICD-10-CM: J96.01 
ICD-9-CM: 518.81  2021 - Present Unknown  
   
 CKD (chronic kidney disease) ICD-10-CM: N18.9 ICD-9-CM: 585.9  2021 - Present Unknown Hypertension ICD-10-CM: I10 
ICD-9-CM: 401.9  2021 - Present Unknown Diabetes mellitus type 2, [facility] ([facility] ICD-10-CM: E11.9 ICD-9-CM: 250.00  2021 - Present Unknown Discharge Diagnosis:  
Acute hypoxemic respiratory failure Acute volume overload Chronic s/dCHF 
ESRD Did Patient have Sepsis (YES OR NO): NO Hospital Course/Interval history: Ms. Abrahan Birmingham is a nice 56 y/o AAF with a h/o HTN, DM, GERD and ESRD who presents today with a 3 day history of SOB, orthopnea and pedal edema. Was hospitalized last month at [facility] for syncopal episode likely related to hypoglycemia. She was seen by Nephrology and Vascular Surgery at that time and it was felt that HD would be indicated soon. She was ultimately discharged with outpatient follow up. She reports compliance with her BP medications. Three days ago she developed SOB and orthopnea. She's also noticed some swelling in her feet. No chest pain, palpitations, ROBB, N/V/D, abdominal pain, fevers, cough, sick contacts. On presentation to the ER her BP was 180/100.  Labs notable for BNP 48K, Cr 7.7 (was around the same last month during her hospitalization). CXR with volume overload. O2 saturation was 87% at rest on RA, now improved with 2L NC O2. We are consulted for admission and further management. Assessment/Plan: 
ACUTE HYPOXEMIC RESPIRATORY FAILURE:  DUE TO VOLUME OVERLOAD SECONDARY TO ESRD/CKD V. 
            4/13:  Nephro holding 60 mg IV lasix bid,  
            Strict I+O 
            Nephrology on board, appreciate  
            5/78: Right IJ temp CVC HD temp cath placed.  
            4/12: Niels Glass #1  
            0/06:  HD #2, symptomatic hypotension, unable to withdraw fluid per note  
            Blood pressure control             Continue oxygen, titrate as needed: 4/10:  Stable on room air 04/14-resolved, no SOB on RA 
  
ACUTE VOLUME OVERLOAD:  BNP ON ADMISSION:  48,000 
            TTE 3/14/21:  EF 45-50%, dilated LV, Grade 2 DD 
            SXY not order another echocardiogram  
            4/12:  Continues to Gem Bo #1 
            6/89:  HD #2, did not tolerate as noted above  
04/14-resolved, no SOB on RA, HD tomorrow 
  
04/14-will need to f/u with vascular surgery for a brachiocephalic fistula on 61/98/0979 
  
ESRD:  TEMP IJ HD CVC PLACEMENT 4/12 WITH #1 UJ             
            Nephrology on board, appreciate  
  
HYPERTENSION WITH HYPERTENSIVE URGENCY:   
            Continue home meds 04/14 BP elevated over the past 24 hours, will add po hydralazine 10 mg TID, current 180/99  
04/15 D/C hydralazine, increase Lisinopril per Nephology 
  
NIDDM II:  A1C 7.0  
            Has not been taking meds in a few weeks:  On po at home  
            Routine SSI ac tid and HS 
 
04/14 
 -24 hrs 147/388/229, may need to start lantus, review tomorrow.  Has had previous problems with hypoglycemia  
04/15-will need to f/u with PCP for DM II management, started Glipizide 2.5 mg daily, per Nephrology  
  
CHRONIC SYSTOLIC AND DIASTOLIC HEART FAILURE:               Continue coreg, NEPHRO HOLDING LASIX 
  
32 PACK YEAR SMOKING HISTORY:  Nicotine patch prn 
            Encourage cessation 
  
ANEMIA OF CHRONIC DISEASE:  HGB ADM: 8.0.  DUE TO RENAL DISEASE 
            No current or past bleeding  
            Follow 04/14-Anemia workup 04/15-WNL Consults:  
IP CONSULT TO NEPHROLOGY 
IP CONSULT TO INTERVENTIONAL RADIOLOGY 
IP CONSULT TO VASCULAR SURGERY Disposition: Home or Self Care Diet: DIET RENAL Code Status: Full Code Follow up with Vascular Surgery Nephrology PCP Current Discharge Medication List  
  
START taking these medications Details  
acetaminophen (TYLENOL) 325 mg tablet Take 2 Tabs by mouth every six (6) hours as needed for Pain or Fever. Qty: 30 Tab, Refills: 0  
  
ferrous sulfate 325 mg (65 mg iron) tablet Take 2 Tabs by mouth two (2) times daily (with meals). Qty: 60 Tab, Refills: 0  
  
lisinopriL (PRINIVIL, ZESTRIL) 20 mg tablet Take 1 Tab by mouth two (2) times a day. Qty: 30 Tab, Refills: 0  
  
nicotine (NICODERM CQ) 21 mg/24 hr 1 Patch by TransDERmal route every twenty-four (24) hours for 30 days. Qty: 30 Patch, Refills: 0  
  
polyethylene glycol (MIRALAX) 17 gram packet Take 1 Packet by mouth daily for 30 days. Qty: 30 Packet, Refills: 0 CONTINUE these medications which have CHANGED Details  
famotidine (Pepcid) 20 mg tablet Take 1 Tab by mouth daily. Qty: 30 Tab, Refills: 0  
  
NIFEdipine ER (PROCARDIA XL) 90 mg ER tablet Take 1 Tab by mouth daily. Qty: 30 Tab, Refills: 0 CONTINUE these medications which have NOT CHANGED Details  
carvediloL (COREG) 25 mg tablet Take 25 mg by mouth two (2) times daily (with meals). Medications Discontinued During This Encounter Medication Reason  glipiZIDE SR (GLUCOTROL XL) 10 mg CR tablet Not A Current Medication  lisinopril (PRINIVIL, ZESTRIL) 40 mg tablet Not A Current Medication  amlodipine (NORVASC) 10 mg tablet Not A Current Medication  metformin (GLUCOPHAGE) 1,000 mg tablet Not A Current Medication  albuterol (PROVENTIL HFA, VENTOLIN HFA) 90 mcg/actuation inhaler Not A Current Medication  triamcinolone acetonide (KENALOG) 0.1 % ointment Not A Current Medication  metaxalone (SKELAXIN) 800 mg tablet Not A Current Medication  naproxen (NAPROSYN) 500 mg tablet Not A Current Medication  NIFEdipine ER (PROCARDIA XL) 60 mg ER tablet Not A Current Medication  famotidine (Pepcid) 40 mg tablet Not A Current Medication  atorvastatin (LIPITOR) 40 mg tablet Not A Current Medication  famotidine (PEPCID) tablet 40 mg   
 sodium bicarbonate tablet 650 mg   
 0.9% sodium chloride infusion  fentaNYL citrate (PF) injection 12.5-100 mcg  midazolam (VERSED) injection 0.25-2 mg   
 NIFEdipine ER (PROCARDIA XL) tablet 60 mg   
 hydrALAZINE (APRESOLINE) tablet 10 mg   
 lisinopriL (PRINIVIL, ZESTRIL) tablet 10 mg Follow Up Orders: Follow-up Appointments Procedures  FOLLOW UP VISIT Appointment in: 3 - 5 Days Nephrology PCP Vascular Surgery 04/21/2021 Nephrology PCP Vascular Surgery 04/21/2021 Standing Status:   Standing Number of Occurrences:   1 Order Specific Question:   Appointment in Answer:   3 - 5 Days Follow-up Information Follow up With Specialties Details Why Contact Info Unknown, Provider    Patient not available to ask Amalia Arechiga MD Nephrology In 3 days  [address] [facility] Nephrology [facility] [address] 
[phone] Shailesh Tate MD Vascular Surgery On 5/7/2021 9:15 AM [address]  [address] [facility] [address] 
[phone] Discharge Exam: 
 
Patient Vitals for the past 24 hrs: 
 Temp Pulse Resp BP SpO2  
04/15/21 0739 98.4 °F (36.9 °C) 81 19 (!) 172/92 99 % 04/15/21 0328 98.8 °F (37.1 °C) 77 19 (!) 165/87 92 % 04/14/21 2347 98.2 °F (36.8 °C) 80 20 (!) 156/81 95 % 04/14/21 1957 99 °F (37.2 °C) 83 19 (!) 142/74 94 % 04/14/21 1500 98.9 °F (37.2 °C) 85 18 (!) 156/87 95 % 04/14/21 1359  81  (!) 176/99   
04/14/21 1330  79  (!) 180/99   
04/14/21 1300  80  (!) 174/112   
04/14/21 1230  77  (!) 180/94   
04/14/21 1200  80  (!) 185/101   
04/14/21 1130  80  (!) 180/102   
04/14/21 1100  79  (!) 181/97  Oxygen Therapy O2 Sat (%): 99 % (04/15/21 0739) Pulse via Oximetry: 78 beats per minute (04/12/21 1301) O2 Device: None (Room air) (04/15/21 0716) O2 Flow Rate (L/min): 5 l/min (04/12/21 1237) ETCO2 (mmHg): 44 mmHg (04/12/21 1252) Estimated body mass index is 23.77 kg/m² as calculated from the following: 
  Height as of this encounter: 5' 3\" (1.6 m). Weight as of this encounter: 60.9 kg (134 lb 3.2 oz). Intake/Output Summary (Last 24 hours) at 4/15/2021 1059 Last data filed at 4/15/2021 1005 Gross per 24 hour Intake 750 ml Output 3000 ml Net -2250 ml *Note that automatically entered I/Os may not be accurate; dependent on patient compliance with collection and accurate  by assistants. Physical Exam:  
General: No acute distress, speaking in full sentences, no use of accessory muscles HEENT:  Pupils equal and reactive to light and accommodation, oropharynx is clear Neck: Supple, no lymphadenopathy, no JVD Lungs: Clear to auscultation bilaterally CV:Regular rate and rhythm with normal S1 and S2 Abdomen:Soft, nontender, nondistended, normoactive bowel sounds Extremities: No cyanosis clubbing or edema Neuro:  Nonfocal, A&O x3  
Psych: Normal affect All Labs from Last 24 Hrs: 
Recent Results (from the past 24 hour(s)) GLUCOSE, POC Collection Time: 04/14/21  4:54 PM  
Result Value Ref Range Glucose (POC) 223 (H) 65 - 100 mg/dL Performed by Mara PLEASE READ & DOCUMENT PPD TEST IN 48 HRS Collection Time: 04/14/21  5:07 PM  
Result Value Ref Range  PPD Negative Negative  
 mm Induration 0 0 - 5 mm GLUCOSE, POC Collection Time: 04/14/21  8:33 PM  
Result Value Ref Range Glucose (POC) 261 (H) 65 - 100 mg/dL Performed by Hemb[facility]MayBPCT   
GLUCOSE, POC Collection Time: 04/15/21  5:39 AM  
Result Value Ref Range Glucose (POC) 182 (H) 65 - 100 mg/dL Performed by [email or web address]   
CBC WITH AUTOMATED DIFF Collection Time: 04/15/21  6:05 AM  
Result Value Ref Range WBC 7.5 4.3 - 11.1 K/uL  
 RBC 2.80 (L) 4.05 - 5.2 M/uL HGB 8.6 (L) 11.7 - 15.4 g/dL HCT 27.2 (L) 35.8 - 46.3 % MCV 97.1 79.6 - 97.8 FL  
 MCH 30.7 26.1 - 32.9 PG  
 MCHC 31.6 31.4 - 35.0 g/dL  
 RDW 14.0 11.9 - 14.6 % PLATELET 336 (L) 101 - 450 K/uL MPV 12.2 9.4 - 12.3 FL ABSOLUTE NRBC 0.00 0.0 - 0.2 K/uL  
 DF AUTOMATED NEUTROPHILS 59 43 - 78 % LYMPHOCYTES 27 13 - 44 % MONOCYTES 11 4.0 - 12.0 % EOSINOPHILS 2 0.5 - 7.8 % BASOPHILS 0 0.0 - 2.0 % IMMATURE GRANULOCYTES 0 0.0 - 5.0 %  
 ABS. NEUTROPHILS 4.4 1.7 - 8.2 K/UL  
 ABS. LYMPHOCYTES 2.1 0.5 - 4.6 K/UL  
 ABS. MONOCYTES 0.8 0.1 - 1.3 K/UL  
 ABS. EOSINOPHILS 0.2 0.0 - 0.8 K/UL  
 ABS. BASOPHILS 0.0 0.0 - 0.2 K/UL  
 ABS. IMM. GRANS. 0.0 0.0 - 0.5 K/UL  
TRANSFERRIN SATURATION Collection Time: 04/15/21  6:05 AM  
Result Value Ref Range Iron 48 35 - 150 ug/dL TIBC 232 (L) 250 - 450 ug/dL Transferrin Saturation 21 >20 % VITAMIN B12 Collection Time: 04/15/21  6:05 AM  
Result Value Ref Range Vitamin B12 748 193 - 986 pg/mL METABOLIC PANEL, BASIC Collection Time: 04/15/21  6:05 AM  
Result Value Ref Range Sodium 139 136 - 145 mmol/L Potassium 4.1 3.5 - 5.1 mmol/L Chloride 104 98 - 107 mmol/L  
 CO2 29 21 - 32 mmol/L Anion gap 6 (L) 7 - 16 mmol/L Glucose 183 (H) 65 - 100 mg/dL BUN 15 8 - 23 MG/DL Creatinine 3.70 (H) 0.6 - 1.0 MG/DL  
 GFR est AA 16 (L) >60 ml/min/1.73m2 GFR est non-AA 13 (L) >60 ml/min/1.73m2 Calcium 8.4 8.3 - 10.4 MG/DL  
FOLATE  Collection Time: 04/15/21  6:05 AM  
Result Value Ref Range Folate 6.8 3.1 - 17.5 ng/mL COVID-19 RAPID TEST Collection Time: 04/15/21  6:41 AM  
Result Value Ref Range Specimen source Nasopharyngeal    
 COVID-19 rapid test Not detected NOTD All [employer] Results Procedure Component Value Units Date/Time COVID-19 RAPID TEST [[id number]] Collected: 04/15/21 7743 Order Status: Completed Specimen: Nasopharyngeal Updated: 04/15/21 0766 Specimen source Nasopharyngeal     
  COVID-19 rapid test Not detected Comment:     
The specimen is NEGATIVE for SARS-CoV-2, the novel coronavirus associated with COVID-19. A negative result does not rule out COVID-19. This test has been authorized by the FDA under an Emergency Use Authorization (EUA) for use by authorized laboratories. Fact sheet for Healthcare Providers: [employer] Fact sheet for Patients: [employer] Methodology: Isothermal Nucleic Acid Amplification CULTURE, URINE [[id number]] Collected: 04/10/21 1957 Order Status: Completed Specimen: Urine from Clean catch Updated: 04/13/21 0318 Special Requests: NO SPECIAL REQUESTS Culture result:    
  >100,000 COLONIES/mL MIXED SKIN GREG ISOLATED THREE OR MORE TYPES OF ORGANISMS ARE PRESENT. THIS IS INDICATIVE OF CONTAMINATION DUE TO IMPROPER COLLECTION TECHNIQUE. PLEASE REPEAT COLLECTION UNLESS PATIENT HAS STARTED ANTIBIOTIC TREATMENT. SARS-CoV-2 Lab Results \"Novel Coronavirus\" Test: No results found for: COV2NT \"Emergent Disease\" Test: No results found for: EDPR \"SARS-COV-2\" Test: No results found for: XGCOVT Rapid Test:  
Lab Results Component Value Date/Time COVR Not detected 04/15/2021 06:41 AM  
 
  
 
 
Diagnostic Imaging/Tests:  
Ir Insert Tunl Cvc W/o Port Over 5 Yr Result Date: 4/12/2021 Technically successful tunneled hemodialysis catheter placement. Plan: Recover for 1 hour. Catheter is ready for use. Suture should be removed in 2 weeks. Xr Chest [facility] Result Date: 4/9/2021 1. CHF/volume overload. CPT code(s) 19126 Duplex Upper Ext Vein Mapping Left Result Date: 4/13/2021 Limited examination. The left upper extremity cephalic, brachial, and basilic veins do appear to be patent. Echocardiogram/EKG results: No results found for this visit on 04/09/21. EKG Results Procedure 720 Value Units Date/Time EKG [[id number]] Collected: 04/09/21 1101 Order Status: Completed Updated: 04/09/21 1738 Ventricular Rate 97 BPM   
  Atrial Rate 97 BPM   
  P-R Interval 158 ms QRS Duration 72 ms Q-T Interval 370 ms QTC Calculation (Bezet) 469 ms Calculated P Axis 84 degrees Calculated R Axis 21 degrees Calculated T Axis 135 degrees Diagnosis --  
  Normal sinus rhythm Nonspecific T wave abnormality Abnormal ECG When compared with ECG of 25-MAR-2011 00:27, 
Nonspecific T wave abnormality now evident in Lateral leads Confirmed by Bear Chen MD ()Ze ([id number]) on 4/9/2021 5:38:44 PM 
  
  
 
 
Results for orders placed or performed during the hospital encounter of 04/09/21 EKG, 12 LEAD, INITIAL Result Value Ref Range Ventricular Rate 97 BPM  
 Atrial Rate 97 BPM  
 P-R Interval 158 ms QRS Duration 72 ms Q-T Interval 370 ms QTC Calculation (Bezet) 469 ms Calculated P Axis 84 degrees Calculated R Axis 21 degrees Calculated T Axis 135 degrees Diagnosis Normal sinus rhythm Nonspecific T wave abnormality Abnormal ECG When compared with ECG of 25-MAR-2011 00:27, 
Nonspecific T wave abnormality now evident in Lateral leads Confirmed by Bear Chen MD ()Ze ([id number]) on 4/9/2021 5:38:44 PM 
  
 
 
Procedures done this admission: 
Procedure(s): LEFT BRACHIO CEPHALIC ARTERIO VENOUS FISTULA PLACEMENT/ 814 Time spent in patient discharge planning and coordination 45 minutes. Signed By: Nicolle Carlson  [facility], [place], operated by [facility]  April 15, 2021  10:59 AM

## 2021-04-15 NOTE — PROGRESS NOTES
Discharge instructions, follow up information, medication list, and prescriptions provided and explained to the pt. IV removed from right AC, catheter tip intact, no remote telemetry on. Opportunity for questions provided. Patient states daughter had to leave to pick children up from school and will return to transport patient home. Instructed to call once ready to leave.

## 2021-04-15 NOTE — PROGRESS NOTES
No acute events overnight. Patient resting quietly in bed. No s/sx of distress noted and patient denies pain at this time. No needs expressed Safety measures in place, call light in reach. Preparing to give BSR to oncoming RN upon arrival to floor.

## 2021-07-24 PROBLEM — N18.6 ESRD (END STAGE RENAL DISEASE) (HCC): Status: ACTIVE | Noted: 2021-01-01

## 2021-07-24 PROBLEM — E87.5 HYPERKALEMIA: Status: ACTIVE | Noted: 2021-01-01

## 2021-07-24 NOTE — H&P
[facility] Hospitalist Note     Admit Date:  2021  1:41 PM   Name:  Kylee Betancourt   Age:  57 y.o.  :  1960   MRN:  [id number]   PCP:  Unknown, Provider, MD  Treatment Team: Attending Provider: Geoff Tomas MD; Primary Nurse: Darwyn Romberg, RN    HPI/Subjective:   Patient is a 79-year-old female with past medical history significant for hypertension, diabetes mellitus, end-stage renal disease on dialysis (MWF) and GERD presented to ED for not feeling well. Patient reports she has missed her dialysis yesterday because she was feeling sick. Today she started throwing up. Also complaining of lower extremity numbness and feeling tired and fatigued. She denies chest pain, palpitation, shortness of breath, diarrhea, fevers or chills. Denies any sick contact. In the ED patient found to be tachycardic and hypertensive. CBC unremarkable, BMP noted for potassium 7.1, repeat 6.8, creatinine 8.06 and BUN 57. Chest x-ray volume overload.     Assessment and Plan:     Hyperkalemia:  Patient has missed dialysis on Friday  No EKG changes  Will give hyperkalemia cocktail including insulin, dextrose, sodium bicarbonate and Lokelma   Repeat potassium level in 2 hours  We will consult nephrology for dialysis    Hypertensive urgency:  Labetalol IV as needed SBP> 180  Continue home meds: Procardia and carvedilol  Hold lisinopril due to hyperkalemia    Generalized weakness:  Could be secondary to missing her dialysis  No obvious source of infection  PT/OT    ESRD:  On dialysis   Nephrology consult for continuation of dialysis    Chronic combined CHF:  Echocardiogram on 3/40/21 with ejection fraction 45 to 50%, dilated LV, grade 2 diastolic dysfunction  Continue home meds Coreg and Lasix    Normocytic anemia:  Secondary to ESRD  Continue ferrous sulfate    Diabetes mellitus:  Hold oral hypoglycemic  Start patient on sliding scale and adjust    All other listed chronic conditions stable, continue essential home meds    Discharge planning: Admit patient to medical floor. Anticipate discharge in 24 to 48 hours depend upon hospital course    DVT ppx ordered  Code status:  Full  Estimated LOS:  Greater than 2 midnights  Risk:  high    Hospital Problems as of 7/24/2021 Date Reviewed: 4/9/2021        Codes Class Noted - Resolved POA    * (Principal) Hyperkalemia ICD-10-CM: E87.5  ICD-9-CM: 276.7  7/24/2021 - Present Unknown        ESRD (end stage renal disease) ([facility] ICD-10-CM: N18.6  ICD-9-CM: 585.6  7/24/2021 - Present Unknown        Hypertension ICD-10-CM: I10  ICD-9-CM: 401.9  4/9/2021 - Present Yes        Diabetes mellitus type 2, controlled ([facility] ICD-10-CM: E11.9  ICD-9-CM: 250.00  4/9/2021 - Present Yes                10 systems reviewed and negative except as noted in HPI. Past Medical History:   Diagnosis Date    Asthma     Diabetes ([facility]     niddm    Diabetes mellitus type 2, controlled ([facility] 4/9/2021    Hypertension       Past Surgical History:   Procedure Laterality Date    IR INSERT TUNL CVC W/O PORT OVER 5 YR  4/12/2021      No Known Allergies   Social History     Tobacco Use    Smoking status: Current Every Day Smoker     Packs/day: 1.00     Years: 32.00     Pack years: 32.00   Substance Use Topics    Alcohol use: No      Family History   Problem Relation Age of Onset    No Known Problems Mother     No Known Problems Father       Family history reviewed and noncontributory. Immunization History   Administered Date(s) Administered    TB Skin Test (PPD) Intradermal 04/12/2021    TD Vaccine 09/16/2008     PTA Medications:  Prior to Admission Medications   Prescriptions Last Dose Informant Patient Reported? Taking? NIFEdipine ER (PROCARDIA XL) 90 mg ER tablet   No No   Sig: Take 1 Tab by mouth daily. acetaminophen (TYLENOL) 325 mg tablet   No No   Sig: Take 2 Tabs by mouth every six (6) hours as needed for Pain or Fever.    carvediloL (COREG) 25 mg tablet   Yes No   Sig: Take 25 mg by mouth two (2) times daily (with meals). famotidine (Pepcid) 20 mg tablet   No No   Sig: Take 1 Tab by mouth daily. ferrous sulfate 325 mg (65 mg iron) tablet   No No   Sig: Take 2 Tabs by mouth two (2) times daily (with meals). glipiZIDE (GLUCOTROL) 5 mg tablet   No No   Sig: Take 0.5 Tabs by mouth Daily (before breakfast). lisinopriL (PRINIVIL, ZESTRIL) 20 mg tablet   No No   Sig: Take 1 Tab by mouth two (2) times a day. sodium bicarbonate 650 mg tablet   Yes No   Sig: Take  by mouth three (3) times daily. Facility-Administered Medications: None       Objective:     Patient Vitals for the past 24 hrs:   Temp Pulse Resp BP SpO2   07/24/21 1508    (!) 177/81 98 %   07/24/21 1438  (!) 101  (!) 172/81 98 %   07/24/21 1408  96  (!) 156/77 98 %   07/24/21 1405  100  (!) 167/81 98 %   07/24/21 1352     100 %   07/24/21 1351  (!) 103   99 %   07/24/21 1333 98 °F (36.7 °C) (!) 114 16 (!) 157/84 99 %     Oxygen Therapy  O2 Sat (%): 98 % (07/24/21 1508)  Pulse via Oximetry: 88 beats per minute (07/24/21 1508)  O2 Device: None (Room air) (07/24/21 1438)    Estimated body mass index is 23.91 kg/m² as calculated from the following:    Height as of this encounter: 5' 3\" (1.6 m). Weight as of this encounter: 61.2 kg (135 lb). No intake or output data in the 24 hours ending 07/24/21 1631    *Note that automatically entered I/Os may not be accurate; dependent on patient compliance with collection and accurate  by assistants. Physical Exam:  General:    Ill-appearing, alert. Eyes:   Normal sclerae. Extraocular movements intact. HENT:  Normocephalic, atraumatic. Moist mucous membranes  CV:   RRR. No m/r/g. Lungs:  CTAB. No wheezing, rhonchi, or rales. Abdomen: Soft, nontender, nondistended. Extremities: Warm and dry. No cyanosis or edema. Neurologic: CN II-XII grossly intact. Sensation intact. Skin:     No rashes or jaundice.   Normal coloration  Psych:  Normal mood and affect. I reviewed the labs, imaging, EKGs, telemetry, and other studies done this admission. Data Reviewed:   Recent Results (from the past 24 hour(s))   CBC WITH AUTOMATED DIFF    Collection Time: 07/24/21  1:37 PM   Result Value Ref Range    WBC 9.5 4.3 - 11.1 K/uL    RBC 4.33 4.05 - 5.2 M/uL    HGB 12.4 11.7 - 15.4 g/dL    HCT 38.8 35.8 - 46.3 %    MCV 89.6 79.6 - 97.8 FL    MCH 28.6 26.1 - 32.9 PG    MCHC 32.0 31.4 - 35.0 g/dL    RDW 14.6 11.9 - 14.6 %    PLATELET 300 531 - 864 K/uL    MPV 11.5 9.4 - 12.3 FL    ABSOLUTE NRBC 0.00 0.0 - 0.2 K/uL    DF AUTOMATED      NEUTROPHILS 70 43 - 78 %    LYMPHOCYTES 21 13 - 44 %    MONOCYTES 7 4.0 - 12.0 %    EOSINOPHILS 1 0.5 - 7.8 %    BASOPHILS 0 0.0 - 2.0 %    IMMATURE GRANULOCYTES 0 0.0 - 5.0 %    ABS. NEUTROPHILS 6.6 1.7 - 8.2 K/UL    ABS. LYMPHOCYTES 2.0 0.5 - 4.6 K/UL    ABS. MONOCYTES 0.7 0.1 - 1.3 K/UL    ABS. EOSINOPHILS 0.1 0.0 - 0.8 K/UL    ABS. BASOPHILS 0.0 0.0 - 0.2 K/UL    ABS. IMM. GRANS. 0.0 0.0 - 0.5 K/UL   METABOLIC PANEL, COMPREHENSIVE    Collection Time: 07/24/21  1:37 PM   Result Value Ref Range    Sodium 133 (L) 136 - 145 mmol/L    Potassium 7.1 (HH) 3.5 - 5.1 mmol/L    Chloride 102 98 - 107 mmol/L    CO2 24 21 - 32 mmol/L    Anion gap 7 7 - 16 mmol/L    Glucose 189 (H) 65 - 100 mg/dL    BUN 57 (H) 8 - 23 MG/DL    Creatinine 8.06 (H) 0.6 - 1.0 MG/DL    GFR est AA 7 (L) >60 ml/min/1.73m2    GFR est non-AA 5 (L) >60 ml/min/1.73m2    Calcium 8.1 (L) 8.3 - 10.4 MG/DL    Bilirubin, total 0.3 0.2 - 1.1 MG/DL    ALT (SGPT) 30 12 - 65 U/L    AST (SGOT) 37 15 - 37 U/L    Alk.  phosphatase 97 50 - 136 U/L    Protein, total 8.9 (H) 6.3 - 8.2 g/dL    Albumin 3.6 3.2 - 4.6 g/dL    Globulin 5.3 (H) 2.3 - 3.5 g/dL    A-G Ratio 0.7 (L) 1.2 - 3.5     MAGNESIUM    Collection Time: 07/24/21  1:37 PM   Result Value Ref Range    Magnesium 2.3 1.8 - 2.4 mg/dL   LIPASE    Collection Time: 07/24/21  1:37 PM   Result Value Ref Range Lipase 473 (H) 73 - 393 U/L   EKG, 12 LEAD, INITIAL    Collection Time: 07/24/21  1:53 PM   Result Value Ref Range    Ventricular Rate 108 BPM    Atrial Rate 108 BPM    P-R Interval 164 ms    QRS Duration 82 ms    Q-T Interval 346 ms    QTC Calculation (Bezet) 463 ms    Calculated P Axis 89 degrees    Calculated R Axis 11 degrees    Calculated T Axis 71 degrees    Diagnosis       !! AGE AND GENDER SPECIFIC ECG ANALYSIS !! Sinus tachycardia  Otherwise normal ECG  When compared with ECG of 09-APR-2021 11:01,  Non-specific change in ST segment in Inferior leads  Nonspecific T wave abnormality no longer evident in Lateral leads     POTASSIUM    Collection Time: 07/24/21  2:49 PM   Result Value Ref Range    Potassium 6.8 (HH) 3.5 - 5.1 mmol/L       All Micro Results     None          Current Facility-Administered Medications   Medication Dose Route Frequency    sodium chloride (NS) flush 5-10 mL  5-10 mL IntraVENous Q8H    sodium chloride (NS) flush 5-10 mL  5-10 mL IntraVENous PRN     Current Outpatient Medications   Medication Sig    gabapentin (NEURONTIN) 100 mg capsule Take 1 Capsule by mouth three (3) times daily.  HYDROcodone-acetaminophen (NORCO) 7.5-325 mg per tablet Take 1 Tablet by mouth every six (6) hours as needed for Pain for up to 5 days. Max Daily Amount: 4 Tablets.  famotidine (Pepcid) 20 mg tablet Take 1 Tab by mouth daily.  NIFEdipine ER (PROCARDIA XL) 90 mg ER tablet Take 1 Tab by mouth daily.  acetaminophen (TYLENOL) 325 mg tablet Take 2 Tabs by mouth every six (6) hours as needed for Pain or Fever.  ferrous sulfate 325 mg (65 mg iron) tablet Take 2 Tabs by mouth two (2) times daily (with meals).  lisinopriL (PRINIVIL, ZESTRIL) 20 mg tablet Take 1 Tab by mouth two (2) times a day.  glipiZIDE (GLUCOTROL) 5 mg tablet Take 0.5 Tabs by mouth Daily (before breakfast).  carvediloL (COREG) 25 mg tablet Take 25 mg by mouth two (2) times daily (with meals).     sodium bicarbonate 650 mg tablet Take  by mouth three (3) times daily. Other Studies:  No results found for this visit on 07/24/21. No results found. SARS-CoV-2 Lab Results  \"Novel Coronavirus\" Test: No results found for: COV2NT   \"Emergent Disease\" Test: No results found for: EDPR  \"SARS-COV-2\" Test: No results found for: XGCOVT  Rapid Test:   Lab Results   Component Value Date/Time    COVR Not detected 04/15/2021 06:41 AM               Part of this note was written by using a voice dictation software and the note has been proof read but may still contain some grammatical/other typographical errors.     Signed:  Gato Rivas MD

## 2021-07-24 NOTE — ED NOTES
TRANSFER - OUT REPORT:    Verbal report given to David Lemus RN on Lenore Sylvester  being transferred to Room #502 for routine progression of care       Report consisted of patients Situation, Background, Assessment and   Recommendations(SBAR). Information from the following report(s) SBAR, Kardex, ED Summary, Intake/Output, MAR and Recent Results was reviewed with the receiving nurse. Lines:   Peripheral IV 07/24/21 Right Hand (Active)        Opportunity for questions and clarification was provided.       Patient transported with:   [employer]

## 2021-07-24 NOTE — PROGRESS NOTES
TRANSFER - OUT REPORT:    Verbal report given to Muna(michael) on Jane Jarrell  being transferred to ICU(unit) for urgent transfer       Report consisted of patients Situation, Background, Assessment and   Recommendations(SBAR). Information from the following report(s) SBAR was reviewed with the receiving nurse. Lines:   Peripheral IV 07/24/21 Right Hand (Active)   Site Assessment Clean, dry, & intact 07/24/21 1721   Phlebitis Assessment 0 07/24/21 1721   Infiltration Assessment 0 07/24/21 1721   Dressing Status Clean, dry, & intact 07/24/21 1721   Dressing Type Transparent 07/24/21 1721   Hub Color/Line Status Pink 07/24/21 1721        Opportunity for questions and clarification was provided.       Patient transported with:  Registered Nurse

## 2021-07-24 NOTE — PROGRESS NOTES
TRANSFER - IN REPORT:    Verbal report received from Luther Lucas RN (name) on Sae Spencer  being received from ER (unit) for routine progression of care      Report consisted of patients Situation, Background, Assessment and   Recommendations(SBAR). Information from the following report(s) SBAR, Kardex, Procedure Summary, Intake/Output, MAR and Recent Results was reviewed with the receiving nurse. Opportunity for questions and clarification was provided. Assessment completed upon patients arrival to unit and care assumed. Dual nurse skin assessment performed. No areas of breakdown noted.

## 2021-07-24 NOTE — PROGRESS NOTES
TRANSFER - IN REPORT:    Verbal report received from Adrianne(name) on Ish Faster  being received from [facility](unit) for routine progression of care      Report consisted of patients Situation, Background, Assessment and   Recommendations(SBAR). Information from the following report(s) SBAR, Kardex, ED Summary and Intake/Output was reviewed with the receiving nurse. Opportunity for questions and clarification was provided. Assessment completed upon patients arrival to unit and care assumed.

## 2021-07-24 NOTE — ED PROVIDER NOTES
Patient with a history of dialysis Monday Wednesday Friday. Also with hypertension and diabetes. Had some nausea and vomiting on Wednesday while at dialysis. Felt better Thursday. Symptoms returned Friday before dialysis and patient did not go. Today patient developed bilateral lower extremity pain in her legs described as a numbness tingling pain. No chest pain or shortness of breath. No abdominal pain. The history is provided by the patient. No  was used. Vomiting   This is a new problem. The current episode started yesterday. The problem occurs 2 to 4 times per day. The problem has not changed since onset. The emesis has an appearance of stomach contents. There has been no fever. Pertinent negatives include no chills, no fever, no abdominal pain, no diarrhea, no headaches, no myalgias, no cough, no URI and no headaches. Her past medical history is significant for DM.         Past Medical History:   Diagnosis Date    Asthma     Diabetes ([facility]     niddm    Diabetes mellitus type 2, controlled ([facility] 4/9/2021    Hypertension        Past Surgical History:   Procedure Laterality Date    IR INSERT TUNL CVC W/O PORT OVER 5 YR  4/12/2021         Family History:   Problem Relation Age of Onset    No Known Problems Mother     No Known Problems Father        Social History     Socioeconomic History    Marital status: SINGLE     Spouse name: Not on file    Number of children: Not on file    Years of education: Not on file    Highest education level: Not on file   Occupational History    Not on file   Tobacco Use    Smoking status: Current Every Day Smoker     Packs/day: 1.00     Years: 32.00     Pack years: 32.00   Substance and Sexual Activity    Alcohol use: No    Drug use: No    Sexual activity: Not on file   Other Topics Concern    Not on file   Social History Narrative    Not on file     Social Determinants of Health     Financial Resource Strain:     Difficulty of Paying Living Expenses:    Food Insecurity:     Worried About [address] [facility] in the Last Year:     [address] in the Last Year:    Transportation Needs:     Lack of Transportation (Medical):  Lack of Transportation (Non-Medical):    Physical Activity:     Days of Exercise per Week:     Minutes of Exercise per Session:    Stress:     Feeling of Stress :    Social Connections:     Frequency of Communication with Friends and Family:     Frequency of Social Gatherings with Friends and Family:     Attends [employer] Services:     Active Member of Clubs or Organizations:     Attends Club or Organization Meetings:     Marital Status:    Intimate Partner Violence:     Fear of Current or Ex-Partner:     Emotionally Abused:     Physically Abused:     Sexually Abused: ALLERGIES: Patient has no known allergies. Review of Systems   Constitutional: Negative for chills and fever. HENT: Negative for rhinorrhea and sore throat. Eyes: Negative for pain and redness. Respiratory: Negative for cough, chest tightness, shortness of breath and wheezing. Cardiovascular: Negative for chest pain and leg swelling. Gastrointestinal: Positive for nausea and vomiting. Negative for abdominal pain and diarrhea. Genitourinary: Negative for dysuria and hematuria. Musculoskeletal: Negative for back pain, myalgias, neck pain and neck stiffness. Skin: Negative for color change and rash. Neurological: Negative for weakness, numbness and headaches. Vitals:    07/24/21 1333 07/24/21 1352   BP: (!) 157/84    Pulse: (!) 114    Resp: 16    Temp: 98 °F (36.7 °C)    SpO2: 99% 100%   Weight: 61.2 kg (135 lb)    Height: 5' 3\" (1.6 m)             Physical Exam  Constitutional:       Appearance: Normal appearance. She is well-developed. HENT:      Head: Normocephalic and atraumatic. Cardiovascular:      Rate and Rhythm: Regular rhythm. Tachycardia present.    Pulmonary:      Effort: Pulmonary effort is normal. No respiratory distress. Breath sounds: Normal breath sounds. No wheezing. Abdominal:      General: Bowel sounds are normal.      Palpations: Abdomen is soft. Tenderness: There is no abdominal tenderness. Musculoskeletal:         General: No swelling or tenderness. Normal range of motion. Cervical back: Normal range of motion and neck supple. Skin:     General: Skin is warm and dry. Findings: No erythema. Neurological:      Mental Status: She is alert and oriented to person, place, and time. MDM  Number of Diagnoses or Management Options  Neuropathy  Diagnosis management comments: Patient's legs are not tender to palpation they just hurt. With history of diabetes possibly has some neuropathy. Not currently on gabapentin. Potassium rechecked after some hemolysis and repeat level 6.8. Will admit for dialysis after missed dialysis yesterday. Amount and/or Complexity of Data Reviewed  Clinical lab tests: ordered and reviewed  Tests in the medicine section of CPT®: ordered and reviewed    Patient Progress  Patient progress: stable         Procedures        EKG: nonspecific ST and T waves changes, sinus tachycardia. Rate 108. Results Include:    Recent Results (from the past 24 hour(s))   CBC WITH AUTOMATED DIFF    Collection Time: 07/24/21  1:37 PM   Result Value Ref Range    WBC 9.5 4.3 - 11.1 K/uL    RBC 4.33 4.05 - 5.2 M/uL    HGB 12.4 11.7 - 15.4 g/dL    HCT 38.8 35.8 - 46.3 %    MCV 89.6 79.6 - 97.8 FL    MCH 28.6 26.1 - 32.9 PG    MCHC 32.0 31.4 - 35.0 g/dL    RDW 14.6 11.9 - 14.6 %    PLATELET 667 573 - 573 K/uL    MPV 11.5 9.4 - 12.3 FL    ABSOLUTE NRBC 0.00 0.0 - 0.2 K/uL    DF AUTOMATED      NEUTROPHILS 70 43 - 78 %    LYMPHOCYTES 21 13 - 44 %    MONOCYTES 7 4.0 - 12.0 %    EOSINOPHILS 1 0.5 - 7.8 %    BASOPHILS 0 0.0 - 2.0 %    IMMATURE GRANULOCYTES 0 0.0 - 5.0 %    ABS. NEUTROPHILS 6.6 1.7 - 8.2 K/UL    ABS. LYMPHOCYTES 2.0 0.5 - 4.6 K/UL    ABS. MONOCYTES 0.7 0.1 - 1.3 K/UL    ABS. EOSINOPHILS 0.1 0.0 - 0.8 K/UL    ABS. BASOPHILS 0.0 0.0 - 0.2 K/UL    ABS. IMM. GRANS. 0.0 0.0 - 0.5 K/UL   METABOLIC PANEL, COMPREHENSIVE    Collection Time: 07/24/21  1:37 PM   Result Value Ref Range    Sodium 133 (L) 136 - 145 mmol/L    Potassium 7.1 (HH) 3.5 - 5.1 mmol/L    Chloride 102 98 - 107 mmol/L    CO2 24 21 - 32 mmol/L    Anion gap 7 7 - 16 mmol/L    Glucose 189 (H) 65 - 100 mg/dL    BUN 57 (H) 8 - 23 MG/DL    Creatinine 8.06 (H) 0.6 - 1.0 MG/DL    GFR est AA 7 (L) >60 ml/min/1.73m2    GFR est non-AA 5 (L) >60 ml/min/1.73m2    Calcium 8.1 (L) 8.3 - 10.4 MG/DL    Bilirubin, total 0.3 0.2 - 1.1 MG/DL    ALT (SGPT) 30 12 - 65 U/L    AST (SGOT) 37 15 - 37 U/L    Alk. phosphatase 97 50 - 136 U/L    Protein, total 8.9 (H) 6.3 - 8.2 g/dL    Albumin 3.6 3.2 - 4.6 g/dL    Globulin 5.3 (H) 2.3 - 3.5 g/dL    A-G Ratio 0.7 (L) 1.2 - 3.5     MAGNESIUM    Collection Time: 07/24/21  1:37 PM   Result Value Ref Range    Magnesium 2.3 1.8 - 2.4 mg/dL   LIPASE    Collection Time: 07/24/21  1:37 PM   Result Value Ref Range    Lipase 473 (H) 73 - 393 U/L   EKG, 12 LEAD, INITIAL    Collection Time: 07/24/21  1:53 PM   Result Value Ref Range    Ventricular Rate 108 BPM    Atrial Rate 108 BPM    P-R Interval 164 ms    QRS Duration 82 ms    Q-T Interval 346 ms    QTC Calculation (Bezet) 463 ms    Calculated P Axis 89 degrees    Calculated R Axis 11 degrees    Calculated T Axis 71 degrees    Diagnosis       !! AGE AND GENDER SPECIFIC ECG ANALYSIS !! Sinus tachycardia  Otherwise normal ECG  When compared with ECG of 09-APR-2021 11:01,  Non-specific change in ST segment in Inferior leads  Nonspecific T wave abnormality no longer evident in Lateral leads              Contains critical data POTASSIUM (Final result)   Component (Lab Inquiry)  Collection Time Result Time K   07/24/21 14:49:00 07/24/21 15:22:33 6.8   RESULTS VERIFIED, PHON. Royal Tigrett Xiomara Tigrett High Panic         Final result

## 2021-07-24 NOTE — ED TRIAGE NOTES
Pt to triage via w/c. Pt has been vomiting since this morning. Pt states Friday she missed dialysis r/t being sick. Pt states Wednesday she began vomiting at dialysis. Pt states both of her legs feel tingly numb on the top. Denies abd pain, fever. Pt states she has diarrhea as well. Pt denies hx of neuropathy. Pt states legs achy as well. Pt still makes urine- denies urinary s/sx. Pt states her whole body is hurting really. Denies loss of taste or smell or HA.

## 2021-07-24 NOTE — CONSULTS
Renal Consult    Subjective:     Kylee Betancourt is a 57 y.o.  female who is being seen for ESRD and hyperkalemia. She has a hx of  hypertension, diabetes mellitus, end-stage renal disease on dialysis (MWF) at [facility]) and dialyzes with a a cuff catheter. Left arm AVF is in and maturing. She reports getting ill at dialysis Wednesday with some nause and diarrhea. She skipped tx yesterday. Today with vomitting and came to the ED. Lab with hyperkalemia at 7.1./     No fever, chills, sweats, epistaxis, vision changes, headache, shortness of breath, wheezing,  chest pain, palpitations. No  constipation. No dysuria, hematuria. No paresthesia, seizure history, no arthritis/ arthralgia or skin rashes.          Past Medical History:   Diagnosis Date    Asthma     Diabetes ([facility]     niddm    Diabetes mellitus type 2, controlled ([facility] 4/9/2021    Hypertension       Past Surgical History:   Procedure Laterality Date    IR INSERT TUNL CVC W/O PORT OVER 5 YR  4/12/2021     Family History   Problem Relation Age of Onset    No Known Problems Mother     No Known Problems Father       Social History     Tobacco Use    Smoking status: Current Every Day Smoker     Packs/day: 1.00     Years: 32.00     Pack years: 32.00   Substance Use Topics    Alcohol use: No       Current Facility-Administered Medications   Medication Dose Route Frequency Provider Last Rate Last Admin    sodium chloride (NS) flush 5-10 mL  5-10 mL IntraVENous Q8H Geoff Tomas MD   5 mL at 07/24/21 1404    sodium chloride (NS) flush 5-10 mL  5-10 mL IntraVENous PRN Geoff Tomas MD        carvediloL (COREG) tablet 25 mg  25 mg Oral BID WITH MEALS Godfrey Shen MD   25 mg at 07/24/21 1802    [START ON 7/25/2021] famotidine (PEPCID) tablet 20 mg  20 mg Oral DAILY Geoff Tomas MD        ferrous sulfate tablet 650 mg  650 mg Oral BID WITH MEALS Geoff Tomas MD   650 mg at 07/24/21 1802    [START ON 7/25/2021] NIFEdipine ER (PROCARDIA XL) tablet 90 mg  90 mg Oral DAILY Haile Newman MD        sodium bicarbonate tablet 650 mg  650 mg Oral TID Haile Newman MD   650 mg at 07/24/21 1802    insulin lispro (HUMALOG) injection 0-10 Units  0-10 Units SubCUTAneous AC&HS Haile Newman MD        sodium chloride (NS) flush 5-40 mL  5-40 mL IntraVENous Q8H Haile Newman MD        sodium chloride (NS) flush 5-40 mL  5-40 mL IntraVENous PRN Haile Newman MD        acetaminophen (TYLENOL) tablet 650 mg  650 mg Oral Q6H PRN Haile Newman MD        Or    acetaminophen (TYLENOL) suppository 650 mg  650 mg Rectal Q6H PRN Haile Newman MD        polyethylene glycol (MIRALAX) packet 17 g  17 g Oral DAILY PRN Haile Newman MD        ondansetron (ZOFRAN ODT) tablet 4 mg  4 mg Oral Q8H PRN Haile Newman MD        Or    ondansetron (ZOFRAN) injection 4 mg  4 mg IntraVENous Q6H PRN Haile Newman MD        heparin (porcine) injection 5,000 Units  5,000 Units SubCUTAneous Q8H Godfrey Estrella MD        sodium zirconium cyclosilicate (LOKELMA) powder packet 15 g  15 g Oral NOW Haile Newman MD        labetaloL (NORMODYNE;TRANDATE) injection 20 mg  20 mg IntraVENous Q6H PRN Haile Newman MD            No Known Allergies     Review of Systems:  A comprehensive review of systems was negative except for that written in the History of Present Illness. Objective: Intake and Output:    07/24 0701 - 07/24 1900  In: 500 [I.V.:500]  Out: -   No intake/output data recorded. Physical Exam:   General appearance: no distress, appears stated age  Head: atraumatic  Eyes: conjunctivae/corneas clear. Nose: no discharge  Throat: Lips, mucosa, and tongue normal.   Neck: supple, cuff catheter on the right side   Lungs: clear to auscultation bilaterally  Heart: regular rate and rhythm, S1, S2, no pericardial friction rub  Abdomen: soft, non-tender.  Bowel sounds normal.   Extremities: No edema    Skin:  No rashes or lesions           Data Review:   Recent Results (from the past 24 hour(s))   CBC WITH AUTOMATED DIFF    Collection Time: 07/24/21  1:37 PM   Result Value Ref Range    WBC 9.5 4.3 - 11.1 K/uL    RBC 4.33 4.05 - 5.2 M/uL    HGB 12.4 11.7 - 15.4 g/dL    HCT 38.8 35.8 - 46.3 %    MCV 89.6 79.6 - 97.8 FL    MCH 28.6 26.1 - 32.9 PG    MCHC 32.0 31.4 - 35.0 g/dL    RDW 14.6 11.9 - 14.6 %    PLATELET 541 595 - 528 K/uL    MPV 11.5 9.4 - 12.3 FL    ABSOLUTE NRBC 0.00 0.0 - 0.2 K/uL    DF AUTOMATED      NEUTROPHILS 70 43 - 78 %    LYMPHOCYTES 21 13 - 44 %    MONOCYTES 7 4.0 - 12.0 %    EOSINOPHILS 1 0.5 - 7.8 %    BASOPHILS 0 0.0 - 2.0 %    IMMATURE GRANULOCYTES 0 0.0 - 5.0 %    ABS. NEUTROPHILS 6.6 1.7 - 8.2 K/UL    ABS. LYMPHOCYTES 2.0 0.5 - 4.6 K/UL    ABS. MONOCYTES 0.7 0.1 - 1.3 K/UL    ABS. EOSINOPHILS 0.1 0.0 - 0.8 K/UL    ABS. BASOPHILS 0.0 0.0 - 0.2 K/UL    ABS. IMM. GRANS. 0.0 0.0 - 0.5 K/UL   METABOLIC PANEL, COMPREHENSIVE    Collection Time: 07/24/21  1:37 PM   Result Value Ref Range    Sodium 133 (L) 136 - 145 mmol/L    Potassium 7.1 (HH) 3.5 - 5.1 mmol/L    Chloride 102 98 - 107 mmol/L    CO2 24 21 - 32 mmol/L    Anion gap 7 7 - 16 mmol/L    Glucose 189 (H) 65 - 100 mg/dL    BUN 57 (H) 8 - 23 MG/DL    Creatinine 8.06 (H) 0.6 - 1.0 MG/DL    GFR est AA 7 (L) >60 ml/min/1.73m2    GFR est non-AA 5 (L) >60 ml/min/1.73m2    Calcium 8.1 (L) 8.3 - 10.4 MG/DL    Bilirubin, total 0.3 0.2 - 1.1 MG/DL    ALT (SGPT) 30 12 - 65 U/L    AST (SGOT) 37 15 - 37 U/L    Alk.  phosphatase 97 50 - 136 U/L    Protein, total 8.9 (H) 6.3 - 8.2 g/dL    Albumin 3.6 3.2 - 4.6 g/dL    Globulin 5.3 (H) 2.3 - 3.5 g/dL    A-G Ratio 0.7 (L) 1.2 - 3.5     MAGNESIUM    Collection Time: 07/24/21  1:37 PM   Result Value Ref Range    Magnesium 2.3 1.8 - 2.4 mg/dL   LIPASE    Collection Time: 07/24/21  1:37 PM   Result Value Ref Range    Lipase 473 (H) 73 - 393 U/L   EKG, 12 LEAD, INITIAL    Collection Time: 07/24/21  1:53 PM   Result Value Ref Range    Ventricular Rate 108 BPM    Atrial Rate 108 BPM    P-R Interval 164 ms    QRS Duration 82 ms    Q-T Interval 346 ms    QTC Calculation (Bezet) 463 ms    Calculated P Axis 89 degrees    Calculated R Axis 11 degrees    Calculated T Axis 71 degrees    Diagnosis       !! AGE AND GENDER SPECIFIC ECG ANALYSIS !! Sinus tachycardia  Otherwise normal ECG  When compared with ECG of 09-APR-2021 11:01,  Non-specific change in ST segment in Inferior leads  Nonspecific T wave abnormality no longer evident in Lateral leads     POTASSIUM    Collection Time: 07/24/21  2:49 PM   Result Value Ref Range    Potassium 6.8 (HH) 3.5 - 5.1 mmol/L           Assessment:     Principal Problem:    Hyperkalemia (7/24/2021)    Active Problems:    Hypertension (4/9/2021)      Diabetes mellitus type 2, controlled ([facility] (4/9/2021)      [facility] (7/24/2021)        Plan:     ESRD on dialysis  Missed tx and now with hyperkalemia. Some elevated BP but no pulmonary edema. She received temporizing meds, but won't be able to wait until Monday when routine HD is available. So will move to ICU and plan  A dialysis tx tonight. She consented    After her treatment she could move out of ICU if beds are needed for critically ill patients.     Signed By: Deja Ordonez MD     July 24, 2021       '

## 2021-07-25 NOTE — PROGRESS NOTES
Patient Vitals for the past 12 hrs:   Temp Pulse Resp BP SpO2   07/25/21 1513 98.7 °F (37.1 °C) 88 21 120/73 94 %   07/25/21 1200 98.3 °F (36.8 °C) 83 20 139/78 97 %   07/25/21 1030  87 (!) 62  98 %   07/25/21 1015  88 (!) 55  98 %   07/25/21 1000  90 21 (!) 148/73 99 %   07/25/21 0945  83 18  97 %   07/25/21 0930  83 (!) 52  95 %   07/25/21 0915  83 (!) 78  97 %   07/25/21 0900  82 (!) 87 (!) 169/82 95 %   07/25/21 0845  86   97 %   07/25/21 0830  90   96 %   07/25/21 0815  93   99 %   07/25/21 0814  92  (!) 166/81    07/25/21 0800  83  (!) 166/81 98 %   07/25/21 0745  94 (!) 87  98 %   07/25/21 0730  97 (!) 104  96 %   07/25/21 0726     95 %   07/25/21 0715 99.2 °F (37.3 °C) 88 (!) 80 (!) 160/74 97 %   07/25/21 0700  77 30 (!) 160/74 97 %   07/25/21 0645  82 (!) 52  96 %   07/25/21 0630  98 (!) 60  100 %     Patient received as tx from ICU today. PRN benadryl given x's 2 for itching. PRN zofran odt given x's 1 for nausea.     Bedside shift report given to Lucy Olivo, on-coming RN

## 2021-07-25 NOTE — PROGRESS NOTES
ACUTE PHYSICAL THERAPY GOALS:  (Developed with and agreed upon by patient and/or caregiver. )  LTG:  (1.)Ms. Ly Frausto will move from supine to sit and sit to supine , scoot up and down and roll side to side in bed with MODIFIED INDEPENDENCE within 1 treatment day(s). GOAL MET 7/25/2021  (2.)Ms. Ly Frausto will transfer from bed to chair and chair to bed with MODIFIED INDEPENDENCE using the least restrictive device within 1 treatment day(s). GOAL MET 7/25/2021  (3.)Ms. Ly Frausto will ambulate with STAND BY ASSIST for 200 feet with the least restrictive device within 1 treatment day(s). GOAL MET 7/25/2021  ________________________________________________________________________________________________       PHYSICAL THERAPY ASSESSMENT: Initial Assessment, Discharge and AM PT Treatment Day # 1      Gabriel Newton is a 57 y.o. female   PRIMARY DIAGNOSIS: Hyperkalemia  Hyperkalemia [E87.5]       Reason for Referral:    ICD-10: Treatment Diagnosis: Difficulty in walking, Not elsewhere classified (R26.2)  INPATIENT: Payor: [employer] / Plan: [place] / Product Type: Commerical /     ASSESSMENT:     REHAB RECOMMENDATIONS:   Recommendation to date pending progress:  Setting:   No further skilled therapy   Equipment:    None     PRIOR LEVEL OF FUNCTION:  (Prior to Hospitalization) INITIAL/CURRENT LEVEL OF FUNCTION:  (Most Recently Demonstrated)   Bed Mobility:   Independent  Sit to Stand:   Independent  Transfers:   Independent  Gait/Mobility:   Independent Bed Mobility:   Independent  Sit to Stand:   Independent  Transfers:   Standby Assistance  Gait/Mobility:   Standby Assistance     ASSESSMENT:  Ms. Ly Frausto presents to hospital on 7/24/21 with vomiting, missed HD session. Pt today reports all symptoms improved and feeling better. Pt able to perform activity with SBA to independent for bed mobility and ambulation in hallway without assistive device. Pt without LOB or SOB with activity.  Pt with elevated /84 after activity, pt states normal, RN aware. Pt reports feels at baseline for activity, no further skilled PT needs at this time. Will discharge. SUBJECTIVE:   Ms. Ly Frausto states, \"I am doing fine now. \"    SOCIAL HISTORY/LIVING ENVIRONMENT: lives dtr/granddtr; independent baseline, no falls  Home Environment: Apartment  # Steps to Enter: 0  One/Two Story Residence: One story  Living Alone: No  Support Systems: Child(chichi)  OBJECTIVE:     PAIN: VITAL SIGNS: LINES/DRAINS:   Pre Treatment: Pain Screen  Pain Scale 1: Numeric (0 - 10)  Pain Intensity 1: 0  Post Treatment: 0 Vital Signs  O2 Device: None (Room air) none  O2 Device: None (Room air)     GROSS EVALUATION:  B LE Within Functional Limits Abnormal/ Functional Abnormal/ Non-Functional (see comments) Not Tested Comments:   AROM [x] [] [] []    PROM [x] [] [] []    Strength [x] [] [] []    Balance [x] [] [] []    Posture [x] [] [] []    Sensation [x] [] [] []    Coordination [x] [] [] []    Tone [x] [] [] []    Edema [x] [] [] []    Activity Tolerance [x] [] [] []     [] [] [] []      COGNITION/  PERCEPTION: Intact Impaired   (see comments) Comments:   Orientation [x] []    Vision [x] []    Hearing [x] []    Command Following [x] []    Safety Awareness [x] []     [] []      MOBILITY: I Mod I S SBA CGA Min Mod Max Total  NT x2 Comments:   Bed Mobility    Rolling [x] [] [] [] [] [] [] [] [] [] []    Supine to Sit [x] [] [] [] [] [] [] [] [] [] []    Scooting [x] [] [] [] [] [] [] [] [] [] []    Sit to Supine [x] [] [] [] [] [] [] [] [] [] []    Transfers    Sit to Stand [] [] [x] [] [] [] [] [] [] [] []    Bed to Chair [] [x] [] [] [] [] [] [] [] [] []    Stand to Sit [] [] [x] [] [] [] [] [] [] [] []    I=Independent, Mod I=Modified Independent, S=Supervision, SBA=Standby Assistance, CGA=Contact Guard Assistance,   Min=Minimal Assistance, Mod=Moderate Assistance, Max=Maximal Assistance, Total=Total Assistance, NT=Not Tested  GAIT: I Mod I S SBA CGA Min Mod Max Total  NT x2 Comments:   Level of Assistance [] [] [] [x] [] [] [] [] [] [] []    Distance 400    DME None    Gait Quality Normal carlos alberto    Weightbearing Status N/A     I=Independent, Mod I=Modified Independent, S=Supervision, SBA=Standby Assistance, CGA=Contact Guard Assistance,   Min=Minimal Assistance, Mod=Moderate Assistance, Max=Maximal Assistance, Total=Total Assistance, NT=Not Tested    [place] Form       How much difficulty does the patient currently have. .. Unable A Lot A Little None   1. Turning over in bed (including adjusting bedclothes, sheets and blankets)? [] 1   [] 2   [] 3   [x] 4   2. Sitting down on and standing up from a chair with arms ( e.g., wheelchair, bedside commode, etc.)   [] 1   [] 2   [] 3   [x] 4   3. Moving from lying on back to sitting on the side of the bed? [] 1   [] 2   [] 3   [x] 4   How much help from another person does the patient currently need. .. Total A Lot A Little None   4. Moving to and from a bed to a chair (including a wheelchair)? [] 1   [] 2   [] 3   [x] 4   5. Need to walk in hospital room? [] 1   [] 2   [] 3   [x] 4   6. Climbing 3-5 steps with a railing? [] 1   [] 2   [] 3   [x] 4   © 2007, Trustees of [facility], under license to [employer]. All rights reserved     Score:  Initial: 24 Most Recent: X (Date: -- )    Interpretation of Tool:  Represents activities that are increasingly more difficult (i.e. Bed mobility, Transfers, Gait). PLAN:   FREQUENCY/DURATION: PT Plan of Care:  (eval and d/c) for duration of hospital stay or until stated goals are met, whichever comes first.    PROBLEM LIST:   (Skilled intervention is medically necessary to address:)  1. none   INTERVENTIONS PLANNED:   (Benefits and precautions of physical therapy have been discussed with the patient.)  1.  discharge     TREATMENT:     EVALUATION: Low Complexity : (Untimed Charge)    TREATMENT:   ($$ Therapeutic Activity: 8-22 mins    )  Therapeutic Activity (8 Minutes): Therapeutic activity included Supine to Sit, Scooting, Transfer Training, Ambulation on level ground, Sitting balance , Standing balance and toileting needs to improve functional Mobility, Strength and Activity tolerance.     TREATMENT GRID:  N/A    AFTER TREATMENT POSITION/PRECAUTIONS:  Chair, Needs within reach and RN notified    INTERDISCIPLINARY COLLABORATION:  RN/PCT and PT/PTA    TOTAL TREATMENT DURATION:  PT Patient Time In/Time Out  Time In: 1849  Time Out: KWESI Sharpe

## 2021-07-25 NOTE — CONSULTS
LEAPFROG EVALUATION -- [facility]    The Patient is current in the ICU for: urgent HD and now s/p and on RA  He is being managed by:  Renal/hospitalist  No need for any additional acute ICU interventions. Patient is awake and alert. Currently hemodynamically stable. Please Call if Needed. No Charge. Andrew Cohen MD    Electronically signed.

## 2021-07-25 NOTE — PROGRESS NOTES
Hospitalist Progress Note     Name:  Indra Jaramillo  Age:61 y.o. Sex:female   :  1960    MRN:  [id number]   Admit Date:  2021    Presenting Complaint: Vomiting    Initial Admission Diagnosis: Hyperkalemia [E87.5]     Hospital Course/Interval history:     Patient is a 59-year-old female with past medical history significant for hypertension, diabetes mellitus, end-stage renal disease on dialysis (MWF) and GERD presented to ED for not feeling well. Patient reports she has missed her dialysis yesterday because she was feeling sick. Today she started throwing up. Also complaining of lower extremity numbness and feeling tired and fatigued. She denies chest pain, palpitation, shortness of breath, diarrhea, fevers or chills. Denies any sick contact. Subjective (21):    Comfortable in bed, denies any respiratory distress, reports generalized weakness but able to ambulate without any difficulty. Reports some nausea but no vomiting. Patient seen and examined. Assessment & Plan     Hyperkalemia: Status post dialysis last night, potassium improved, patient moved out of ICU with remote telemetry.       Hypertensive urgency: Blood pressure started improving       Generalized weakness:   Could be secondary to missing her dialysis  No obvious source of infection  PT/OT     ESRD:  On dialysis   Nephrology consult for continuation of dialysis     Chronic combined CHF:  Echocardiogram on 3/40/21 with ejection fraction 45 to 50%, dilated LV, grade 2 diastolic dysfunction  Continue home meds Coreg and Lasix     Normocytic anemia:  Secondary to ESRD  Continue ferrous sulfate     Diabetes mellitus:  Hold oral hypoglycemic  Start patient on sliding scale and adjust     All other listed chronic conditions stable, continue essential home meds     Discharge planning: Admit patient to medical floor.   Anticipate discharge in 24 to 48 hours depend upon hospital course     DVT ppx ordered  Code status:  Full  Estimated LOS:  Greater than 2 midnights  Risk:  high        Objective:     Patient Vitals for the past 24 hrs:   Temp Pulse Resp BP SpO2   07/25/21 1200 98.3 °F (36.8 °C) 83 20 139/78 97 %   07/25/21 1030  87 (!) 62  98 %   07/25/21 1015  88 (!) 55  98 %   07/25/21 1000  90 21 (!) 148/73 99 %   07/25/21 0945  83 18  97 %   07/25/21 0930  83 (!) 52  95 %   07/25/21 0915  83 (!) 78  97 %   07/25/21 0900  82 (!) 87 (!) 169/82 95 %   07/25/21 0845  86   97 %   07/25/21 0830  90   96 %   07/25/21 0815  93   99 %   07/25/21 0814  92  (!) 166/81    07/25/21 0800  83  (!) 166/81 98 %   07/25/21 0745  94 (!) 87  98 %   07/25/21 0730  97 (!) 104  96 %   07/25/21 0726     95 %   07/25/21 0715 99.2 °F (37.3 °C) 88 (!) 80 (!) 160/74 97 %   07/25/21 0700  77 30 (!) 160/74 97 %   07/25/21 0645  82 (!) 52  96 %   07/25/21 0630  98 (!) 60  100 %   07/25/21 0615  100 (!) 123  98 %   07/25/21 0601  82 (!) 103 139/68 97 %   07/25/21 0501  87 26 138/67 96 %   07/25/21 0401  (!) 104 22 (!) 155/84 99 %   07/25/21 0301 98.6 °F (37 °C) 85 15 (!) 155/77 96 %   07/25/21 0205  87 14 (!) 142/68 97 %   07/25/21 0002  86 (!) 40 134/63 94 %   07/24/21 2320  92  (!) 142/81    07/24/21 2302 98.7 °F (37.1 °C) 93 14 (!) 143/80 98 %   07/24/21 2300  93 22 (!) 143/80 97 %   07/24/21 2230  88 12 (!) 144/77 96 %   07/24/21 2200  86 18 (!) 157/85 97 %   07/24/21 2150  92  139/75    07/24/21 2130  89 17 (!) 151/80 96 %   07/24/21 2102  87 25  97 %   07/24/21 2100  86 13 (!) 145/83 99 %   07/24/21 2030  83 14 (!) 144/81 97 %   07/24/21 2027  81  (!) 159/84    07/24/21 2001  81 14 (!) 156/86 96 %   07/24/21 1941 98.7 °F (37.1 °C) 83 10 (!) 157/85 99 %   07/24/21 1831 98.3 °F (36.8 °C) 90 17 (!) 162/81 98 %   07/24/21 1721 98.2 °F (36.8 °C) 88 18 (!) 160/81 97 %   07/24/21 1638    (!) 175/82    07/24/21 1632     98 %   07/24/21 1608    (!) 185/86 98 % 07/24/21 1538    (!) 166/81 100 %   07/24/21 1508    (!) 177/81 98 %   07/24/21 1438  (!) 101  (!) 172/81 98 %   07/24/21 1408  96  (!) 156/77 98 %   07/24/21 1405  100  (!) 167/81 98 %   07/24/21 1352     100 %   07/24/21 1351  (!) 103   99 %     Oxygen Therapy  O2 Sat (%): 97 % (07/25/21 1200)  Pulse via Oximetry: 89 beats per minute (07/25/21 1030)  O2 Device: None (Room air) (07/25/21 1200)    Body mass index is 24.8 kg/m².     Physical Exam:   General:  No acute distress, speaking in full sentences, no use of accessory muscles   Lungs:  Clear to auscultation bilaterally   CV:  Regular rate and rhythm with normal S1 and S2   Abdomen:  Soft, nontender, nondistended, normoactive bowel sounds   Extremities:  No cyanosis clubbing or edema   Neuro:  Nonfocal, A&O x3   Psych:  Normal affect     Data Review:  I have reviewed all labs, meds, and studies from the last 24 hours:    Labs:    Recent Results (from the past 24 hour(s))   EKG, 12 LEAD, INITIAL    Collection Time: 07/24/21  1:53 PM   Result Value Ref Range    Ventricular Rate 108 BPM    Atrial Rate 108 BPM    P-R Interval 164 ms    QRS Duration 82 ms    Q-T Interval 346 ms    QTC Calculation (Bezet) 463 ms    Calculated P Axis 89 degrees    Calculated R Axis 11 degrees    Calculated T Axis 71 degrees    Diagnosis         Sinus tachycardia  Otherwise normal ECG  When compared with ECG of 09-APR-2021 11:01,  Non-specific change in ST segment in Inferior leads  Nonspecific T wave abnormality no longer evident in Lateral leads  Confirmed by Leonor Black ([id number]) on 7/25/2021 6:09:20 AM     POTASSIUM    Collection Time: 07/24/21  2:49 PM   Result Value Ref Range    Potassium 6.8 (HH) 3.5 - 5.1 mmol/L   POTASSIUM    Collection Time: 07/24/21  6:51 PM   Result Value Ref Range    Potassium 5.7 (H) 3.5 - 5.1 mmol/L   HEP B SURFACE AG    Collection Time: 07/24/21  8:49 PM   Result Value Ref Range    Hep B Surface Ag NONREACTIVE NR     GLUCOSE, POC Collection Time: 07/24/21  9:28 PM   Result Value Ref Range    Glucose (POC) 86 65 - 100 mg/dL    Performed by Vyu    CBC W/O DIFF    Collection Time: 07/25/21  3:56 AM   Result Value Ref Range    WBC 6.9 4.3 - 11.1 K/uL    RBC 3.85 (L) 4.05 - 5.2 M/uL    HGB 11.1 (L) 11.7 - 15.4 g/dL    HCT 34.8 (L) 35.8 - 46.3 %    MCV 90.4 79.6 - 97.8 FL    MCH 28.8 26.1 - 32.9 PG    MCHC 31.9 31.4 - 35.0 g/dL    RDW 14.7 (H) 11.9 - 14.6 %    PLATELET 719 (L) 382 - 450 K/uL    MPV 12.0 9.4 - 12.3 FL    ABSOLUTE NRBC 0.00 0.0 - 0.2 K/uL   METABOLIC PANEL, BASIC    Collection Time: 07/25/21  3:56 AM   Result Value Ref Range    Sodium 139 136 - 145 mmol/L    Potassium 4.3 3.5 - 5.1 mmol/L    Chloride 104 98 - 107 mmol/L    CO2 29 21 - 32 mmol/L    Anion gap 6 (L) 7 - 16 mmol/L    Glucose 90 65 - 100 mg/dL    BUN 25 (H) 8 - 23 MG/DL    Creatinine 5.10 (H) 0.6 - 1.0 MG/DL    GFR est AA 11 (L) >60 ml/min/1.73m2    GFR est non-AA 9 (L) >60 ml/min/1.73m2    Calcium 8.3 8.3 - 10.4 MG/DL   GLUCOSE, POC    Collection Time: 07/25/21  6:05 AM   Result Value Ref Range    Glucose (POC) 58 (L) 65 - 100 mg/dL    Performed by Vyu    GLUCOSE, POC    Collection Time: 07/25/21  6:27 AM   Result Value Ref Range    Glucose (POC) 55 (L) 65 - 100 mg/dL    Performed by [employer]o [employer]    GLUCOSE, POC    Collection Time: 07/25/21  6:48 AM   Result Value Ref Range    Glucose (POC) 81 65 - 100 mg/dL    Performed by [employer]    GLUCOSE, POC    Collection Time: 07/25/21 10:56 AM   Result Value Ref Range    Glucose (POC) 148 (H) 65 - 100 mg/dL    Performed by Yefri/POONAM        All Micro Results     None          EKG Results     Procedure 720 Value Units Date/Time    EKG, 12 LEAD, INITIAL [[id number]] Collected: 07/24/21 1353    Order Status: Completed Updated: 07/25/21 0609     Ventricular Rate 108 BPM      Atrial Rate 108 BPM      P-R Interval 164 ms      QRS Duration 82 ms      Q-T Interval 346 ms      QTC Calculation (Bezet) 463 ms      Calculated P Axis 89 degrees      Calculated R Axis 11 degrees      Calculated T Axis 71 degrees      Diagnosis --       Sinus tachycardia  Otherwise normal ECG  When compared with ECG of 09-APR-2021 11:01,  Non-specific change in ST segment in Inferior leads  Nonspecific T wave abnormality no longer evident in Lateral leads  Confirmed by David Gallegos ([id number]) on 7/25/2021 6:09:20 AM            Other Studies:  No results found.     Current Meds:   Current Facility-Administered Medications   Medication Dose Route Frequency    dextrose 40% (GLUTOSE) oral gel 1 Tube  15 g Oral PRN    glucagon (GLUCAGEN) injection 1 mg  1 mg IntraMUSCular PRN    dextrose (D50W) injection syrg 12.5-25 g  25-50 mL IntraVENous PRN    sodium chloride (NS) flush 5-10 mL  5-10 mL IntraVENous Q8H    sodium chloride (NS) flush 5-10 mL  5-10 mL IntraVENous PRN    carvediloL (COREG) tablet 25 mg  25 mg Oral BID WITH MEALS    famotidine (PEPCID) tablet 20 mg  20 mg Oral DAILY    ferrous sulfate tablet 650 mg  650 mg Oral BID WITH MEALS    NIFEdipine ER (PROCARDIA XL) tablet 90 mg  90 mg Oral DAILY    sodium bicarbonate tablet 650 mg  650 mg Oral TID    insulin lispro (HUMALOG) injection 0-10 Units  0-10 Units SubCUTAneous AC&HS    sodium chloride (NS) flush 5-40 mL  5-40 mL IntraVENous Q8H    sodium chloride (NS) flush 5-40 mL  5-40 mL IntraVENous PRN    acetaminophen (TYLENOL) tablet 650 mg  650 mg Oral Q6H PRN    Or    acetaminophen (TYLENOL) suppository 650 mg  650 mg Rectal Q6H PRN    polyethylene glycol (MIRALAX) packet 17 g  17 g Oral DAILY PRN    ondansetron (ZOFRAN ODT) tablet 4 mg  4 mg Oral Q8H PRN    Or    ondansetron (ZOFRAN) injection 4 mg  4 mg IntraVENous Q6H PRN    heparin (porcine) injection 5,000 Units  5,000 Units SubCUTAneous Q8H    labetaloL (NORMODYNE;TRANDATE) injection 20 mg  20 mg IntraVENous Q6H PRN    heparin (porcine) 1,000 unit/mL injection 3,000 Units  3,000 Units Hemodialysis DIALYSIS PRN       Problem List:  Hospital Problems as of 7/25/2021 Date Reviewed: 4/9/2021        Codes Class Noted - Resolved POA    * (Principal) Hyperkalemia ICD-10-CM: E87.5  ICD-9-CM: 276.7  7/24/2021 - Present Unknown        ESRD (end stage renal disease) ([facility] ICD-10-CM: N18.6  ICD-9-CM: 585.6  7/24/2021 - Present Unknown        Hypertension ICD-10-CM: I10  ICD-9-CM: 401.9  4/9/2021 - Present Yes        Diabetes mellitus type 2, controlled ([facility] ICD-10-CM: E11.9  ICD-9-CM: 250.00  4/9/2021 - Present Yes               Medical decision: Presented with hyperkalemia, closely monitored for cardiac continuous, ordered dialysis, dialysis was done, patient hyperkalemia started improving. Transferring to floor further management. Part of this note was written by using a voice dictation software and the note has been proof read but may still contain some grammatical/other typographical errors.     Signed By: Sharif Sarabia MD   [employer] [facility]    July 25, 2021  5:15 PM

## 2021-07-25 NOTE — DIALYSIS
EMERGENT DIALYSIS    Consent verified for renal replacement therapy. Patient ALERT and vital signs stable. /84 P83    Hemodialysis initiated using RIGHT CHEST CATHETER. Aspirated and flushed both ports without difficulty. Dressing clean, dry and intact. Machine settings per MD order. Will monitor during treatment.

## 2021-07-25 NOTE — PROGRESS NOTES
Renal Progress Note    Admission Date: 7/24/2021   Subjective:      Feels better.  No nausea/ vomiting, or diarrhea     Objective:     Physical Exam:    Patient Vitals for the past 8 hrs:   BP Temp Pulse Resp SpO2   07/25/21 0900 (!) 169/82  82 (!) 87 95 %   07/25/21 0845   86  97 %   07/25/21 0830   90  96 %   07/25/21 0815   93  99 %   07/25/21 0814 (!) 166/81  92     07/25/21 0800 (!) 166/81  83  98 %   07/25/21 0745   94 (!) 87 98 %   07/25/21 0730   97 (!) 104 96 %   07/25/21 0726     95 %   07/25/21 0715 (!) 160/74 99.2 °F (37.3 °C) 88 (!) 80 97 %   07/25/21 0700 (!) 160/74  77 30 97 %   07/25/21 0645   82 (!) 52 96 %   07/25/21 0630   98 (!) 60 100 %   07/25/21 0615   100 (!) 123 98 %   07/25/21 0601 139/68  82 (!) 103 97 %   07/25/21 0501 138/67  87 26 96 %   07/25/21 0401 (!) 155/84  (!) 104 22 99 %   07/25/21 0301 (!) 155/77 98.6 °F (37 °C) 85 15 96 %   07/25/21 0205 (!) 142/68  87 14 97 %      Gen: comfortable , NAD  HEENT: moist membranes  CV: S1, S2  Lungs: Clear bilaterally  Extem: no edema     Current Facility-Administered Medications   Medication Dose Route Frequency    dextrose 40% (GLUTOSE) oral gel 1 Tube  15 g Oral PRN    glucagon (GLUCAGEN) injection 1 mg  1 mg IntraMUSCular PRN    dextrose (D50W) injection syrg 12.5-25 g  25-50 mL IntraVENous PRN    sodium chloride (NS) flush 5-10 mL  5-10 mL IntraVENous Q8H    sodium chloride (NS) flush 5-10 mL  5-10 mL IntraVENous PRN    carvediloL (COREG) tablet 25 mg  25 mg Oral BID WITH MEALS    famotidine (PEPCID) tablet 20 mg  20 mg Oral DAILY    ferrous sulfate tablet 650 mg  650 mg Oral BID WITH MEALS    NIFEdipine ER (PROCARDIA XL) tablet 90 mg  90 mg Oral DAILY    sodium bicarbonate tablet 650 mg  650 mg Oral TID    insulin lispro (HUMALOG) injection 0-10 Units  0-10 Units SubCUTAneous AC&HS    sodium chloride (NS) flush 5-40 mL  5-40 mL IntraVENous Q8H    sodium chloride (NS) flush 5-40 mL  5-40 mL IntraVENous PRN    acetaminophen (TYLENOL) tablet 650 mg  650 mg Oral Q6H PRN    Or    acetaminophen (TYLENOL) suppository 650 mg  650 mg Rectal Q6H PRN    polyethylene glycol (MIRALAX) packet 17 g  17 g Oral DAILY PRN    ondansetron (ZOFRAN ODT) tablet 4 mg  4 mg Oral Q8H PRN    Or    ondansetron (ZOFRAN) injection 4 mg  4 mg IntraVENous Q6H PRN    heparin (porcine) injection 5,000 Units  5,000 Units SubCUTAneous Q8H    labetaloL (NORMODYNE;TRANDATE) injection 20 mg  20 mg IntraVENous Q6H PRN    heparin (porcine) 1,000 unit/mL injection 3,000 Units  3,000 Units Hemodialysis DIALYSIS PRN            Data Review:     LABS:   Recent Results (from the past 12 hour(s))   GLUCOSE, POC    Collection Time: 07/24/21  9:28 PM   Result Value Ref Range    Glucose (POC) 86 65 - 100 mg/dL    Performed by Nelda    CBC W/O DIFF    Collection Time: 07/25/21  3:56 AM   Result Value Ref Range    WBC 6.9 4.3 - 11.1 K/uL    RBC 3.85 (L) 4.05 - 5.2 M/uL    HGB 11.1 (L) 11.7 - 15.4 g/dL    HCT 34.8 (L) 35.8 - 46.3 %    MCV 90.4 79.6 - 97.8 FL    MCH 28.8 26.1 - 32.9 PG    MCHC 31.9 31.4 - 35.0 g/dL    RDW 14.7 (H) 11.9 - 14.6 %    PLATELET 785 (L) 143 - 450 K/uL    MPV 12.0 9.4 - 12.3 FL    ABSOLUTE NRBC 0.00 0.0 - 0.2 K/uL   METABOLIC PANEL, BASIC    Collection Time: 07/25/21  3:56 AM   Result Value Ref Range    Sodium 139 136 - 145 mmol/L    Potassium 4.3 3.5 - 5.1 mmol/L    Chloride 104 98 - 107 mmol/L    CO2 29 21 - 32 mmol/L    Anion gap 6 (L) 7 - 16 mmol/L    Glucose 90 65 - 100 mg/dL    BUN 25 (H) 8 - 23 MG/DL    Creatinine 5.10 (H) 0.6 - 1.0 MG/DL    GFR est AA 11 (L) >60 ml/min/1.73m2    GFR est non-AA 9 (L) >60 ml/min/1.73m2    Calcium 8.3 8.3 - 10.4 MG/DL   GLUCOSE, POC    Collection Time: 07/25/21  6:05 AM   Result Value Ref Range    Glucose (POC) 58 (L) 65 - 100 mg/dL    Performed by Nelda    GLUCOSE, POC    Collection Time: 07/25/21  6:27 AM   Result Value Ref Range    Glucose (POC) 55 (L) 65 - 100 mg/dL    Performed by Nelda    GLUCOSE, POC    Collection Time: 07/25/21  6:48 AM   Result Value Ref Range    Glucose (POC) 81 65 - 100 mg/dL    Performed by Nelda          Plan:     Principal Problem:    Hyperkalemia (7/24/2021)    Active Problems:    Hypertension (4/9/2021)      Diabetes mellitus type 2, controlled ([facility] (4/9/2021)      ESRD (end stage renal disease) ([facility] (7/24/2021)      ESRD on dialysis  Missed tx and initially with hyperkalemia. Some elevated BP but no pulmonary edema. She received temporizing meds,So  moved to ICU and  Dialyzed urgently last PM  Good response with normal K today  Next tx would be tomorrow. GI symptoms kept her from treatment- at this point resolved. Question a gastroenteritis.     Transfer to floor bed

## 2021-07-25 NOTE — PROGRESS NOTES
initial visit. Met with patient at bedside, offered support, assurance of spiritual care staff's prayers.     Reece GUO

## 2021-07-25 NOTE — DIALYSIS
DIALYSIS    Hemodialysis treatment completed. Lines clotted and had to re string. Tolerated well. Patient alert and VS stable  /81  P 92       2 Kgs removed. Flushed both ports with 10 mL of NS.  CVC dressing changed. clean, dry, and intact, tego caps intact, bilateral lumens wrapped with 4x4 gauze.

## 2021-07-26 NOTE — DISCHARGE SUMMARY
[facility] Hospitalist Discharge Summary     Name:  Pa Standard    Age:61 y.o. Sex:female  :  1960       MRN:  [id number]       Admitting Physician: Anna Gonzales MD Admit Date: 2021  1:41 PM   Attending Physician: Gume Astorga MD  Primary Care Physician: Devyn Devine,        Discharge Physician: Mega Mares MD  Discharge date: 21   Discharged Condition: Stable    Indication for Admission:   Chief Complaint   Patient presents with    Vomiting        Reasons for hospitalization:  Hospital Problems as of 2021 Date Reviewed: 2021        Codes Class Noted - Resolved POA    * (Principal) Hyperkalemia ICD-10-CM: E87.5  ICD-9-CM: 276.7  2021 - Present Unknown        ES[facility]end stage renal disease) ([facility] ICD-10-CM: N18.6  ICD-9-CM: 585.6  2021 - Present Unknown        Hypertension ICD-10-CM: I10  ICD-9-CM: 401.9  2021 - Present Yes        Diabetes mellitus type 2, controlled ([facility] ICD-10-CM: E11.9  ICD-9-CM: 250.00  2021 - Present Yes                 Discharge Diagnosis: hyperkalemia, severe     Did Patient have Sepsis (YES OR NO): no       Hospital Course/Interval history:    Patient with past medical history of    ESRD   Hypertension   DM type 2  GERD     Patient missed HD session as out-patient. She came to hospital with complaint of \"not feeling well\". She was found to have severe hyperkalemia with K of 7.1 at admission. She was treated for hyperkalemia. K improved to 4.4 by the next day. Patient was found to have elevated hypertension as well. Patient was treated with anti-hypertensive medications. BP is improved. Patient had HD on 2021 and did well and would like to be discharged afterwards. Assessment/Plan:  Continue dialysis as per routine.      Consults:   IP CONSULT TO NEPHROLOGY     Disposition: Home or Self Care  Diet:   DIET ADULT  Code Status: Full Code      Follow up labs/imaging: -  Follow up with HD in  As per schedule. Pending labs/studies: -     Current Discharge Medication List      START taking these medications    Details   gabapentin (NEURONTIN) 100 mg capsule Take 1 Capsule by mouth three (3) times daily. Qty: 60 Capsule, Refills: 0  Start date: 7/24/2021      HYDROcodone-acetaminophen (NORCO) 7.5-325 mg per tablet Take 1 Tablet by mouth every six (6) hours as needed for Pain for up to 5 days. Max Daily Amount: 4 Tablets. Qty: 15 Tablet, Refills: 0  Start date: 7/24/2021, End date: 7/29/2021    Associated Diagnoses: Neuropathy         CONTINUE these medications which have NOT CHANGED    Details   famotidine (Pepcid) 20 mg tablet Take 1 Tab by mouth daily. Qty: 30 Tab, Refills: 0      NIFEdipine ER (PROCARDIA XL) 90 mg ER tablet Take 1 Tab by mouth daily. Qty: 30 Tab, Refills: 0      acetaminophen (TYLENOL) 325 mg tablet Take 2 Tabs by mouth every six (6) hours as needed for Pain or Fever. Qty: 30 Tab, Refills: 0      ferrous sulfate 325 mg (65 mg iron) tablet Take 2 Tabs by mouth two (2) times daily (with meals). Qty: 60 Tab, Refills: 0      lisinopriL (PRINIVIL, ZESTRIL) 20 mg tablet Take 1 Tab by mouth two (2) times a day. Qty: 30 Tab, Refills: 0      carvediloL (COREG) 25 mg tablet Take 25 mg by mouth two (2) times daily (with meals). STOP taking these medications       glipiZIDE (GLUCOTROL) 5 mg tablet Comments:   Reason for Stopping:         sodium bicarbonate 650 mg tablet Comments:   Reason for Stopping:               Medications Discontinued During This Encounter   Medication Reason    lisinopriL (PRINIVIL, ZESTRIL) tablet 20 mg          Follow Up Orders: Follow-up Appointments   Procedures    FOLLOW UP VISIT Appointment in: Other (Specify) See your primary doctor in 3-5 days. Continue hemodialysis routinely as per your schedule. Do not miss dialysis sessions. See your primary doctor in 3-5 days. Continue hemodialysis routinely as per your schedule. Do not miss dialysis sessions. Standing Status:   Standing     Number of Occurrences:   1     Order Specific Question:   Appointment in     Answer: Other (Specify)         Follow-up Information     Follow up With Specialties Details Why Contact Info    Unknown, Provider, MD  In 2 days  Patient not available to ask      [facility] EMERGENCY DEPT Emergency Medicine  As needed, If symptoms worsen [address] [id number]  [facility], [address], [facility]  [phone]              Discharge Exam:    Patient Vitals for the past 24 hrs:   Temp Pulse Resp BP SpO2   07/26/21 1218 98.8 °F (37.1 °C) 98 17 (!) 168/87 97 %   07/26/21 1151  95  (!) 161/95    07/26/21 1126  93  (!) 160/91    07/26/21 1102  95  (!) 165/98    07/26/21 1052  94  (!) 166/97    07/26/21 1029  89  (!) 159/88    07/26/21 0953  94  (!) 147/85    07/26/21 0933  88  (!) 151/84    07/26/21 0903  90  (!) 153/85    07/26/21 0828  93  (!) 144/73    07/26/21 0814  96  (!) 145/76    07/26/21 0735 98.7 °F (37.1 °C) 87 17 125/70 96 %   07/26/21 0229 98.6 °F (37 °C) 88 20 118/65 98 %   07/25/21 2311 98.5 °F (36.9 °C) 85 20 122/75 95 %   07/25/21 1924 98.8 °F (37.1 °C) 96 20 124/75 96 %   07/25/21 1513 98.7 °F (37.1 °C) 88 21 120/73 94 %     Oxygen Therapy  O2 Sat (%): 97 % (07/26/21 1218)  Pulse via Oximetry: 89 beats per minute (07/25/21 1030)  O2 Device: None (Room air) (07/26/21 1218)    Estimated body mass index is 24.76 kg/m² as calculated from the following:    Height as of this encounter: 5' 3\" (1.6 m). Weight as of this encounter: 63.4 kg (139 lb 12.4 oz).       Intake/Output Summary (Last 24 hours) at 7/26/2021 1342  Last data filed at 7/26/2021 1151  Gross per 24 hour   Intake 678 ml   Output 2000 ml   Net -1322 ml       *Note that automatically entered I/Os may not be accurate; dependent on patient compliance with collection and accurate  by assistants. Physical Exam:   General:  No acute distress, speaking in full sentences, no use of accessory muscles   HEENT:  Pupils equal and reactive to light and accommodation, oropharynx is clear   Neck:   Supple, no lymphadenopathy, no JVD   Lungs:  Clear to auscultation bilaterally   CV:  Regular rate and rhythm with normal S1 and S2   Abdomen: Soft, nontender, nondistended, normoactive bowel sounds   Extremities:  No cyanosis clubbing or edema   Neuro:  Nonfocal, A&O x3   Psych:  Normal affect       All Labs from Last 24 Hrs:  Recent Results (from the past 24 hour(s))   GLUCOSE, POC    Collection Time: 07/25/21  5:04 PM   Result Value Ref Range    Glucose (POC) 150 (H) 65 - 100 mg/dL    Performed by [facility]    GLUCOSE, POC    Collection Time: 07/25/21  9:05 PM   Result Value Ref Range    Glucose (POC) 186 (H) 65 - 100 mg/dL    Performed by [facility]    METABOLIC PANEL, COMPREHENSIVE    Collection Time: 07/26/21  3:48 AM   Result Value Ref Range    Sodium 140 136 - 145 mmol/L    Potassium 4.4 3.5 - 5.1 mmol/L    Chloride 104 98 - 107 mmol/L    CO2 28 21 - 32 mmol/L    Anion gap 8 7 - 16 mmol/L    Glucose 93 65 - 100 mg/dL    BUN 39 (H) 8 - 23 MG/DL    Creatinine 7.27 (H) 0.6 - 1.0 MG/DL    GFR est AA 7 (L) >60 ml/min/1.73m2    GFR est non-AA 6 (L) >60 ml/min/1.73m2    Calcium 8.0 (L) 8.3 - 10.4 MG/DL    Bilirubin, total 0.2 0.2 - 1.1 MG/DL    ALT (SGPT) 20 12 - 65 U/L    AST (SGOT) 18 15 - 37 U/L    Alk.  phosphatase 81 50 - 136 U/L    Protein, total 7.2 6.3 - 8.2 g/dL    Albumin 3.0 (L) 3.2 - 4.6 g/dL    Globulin 4.2 (H) 2.3 - 3.5 g/dL    A-G Ratio 0.7 (L) 1.2 - 3.5     CBC WITH AUTOMATED DIFF    Collection Time: 07/26/21  3:48 AM   Result Value Ref Range    WBC 6.7 4.3 - 11.1 K/uL    RBC 3.79 (L) 4.05 - 5.2 M/uL    HGB 10.9 (L) 11.7 - 15.4 g/dL    HCT 34.4 (L) 35.8 - 46.3 %    MCV 90.8 79.6 - 97.8 FL    MCH 28.8 26.1 - 32.9 PG    MCHC 31.7 31.4 - 35.0 g/dL    RDW 14.7 (H) 11.9 - 14.6 % PLATELET 714 (L) 400 - 450 K/uL    MPV 11.6 9.4 - 12.3 FL    ABSOLUTE NRBC 0.00 0.0 - 0.2 K/uL    DF AUTOMATED      NEUTROPHILS 47 43 - 78 %    LYMPHOCYTES 39 13 - 44 %    MONOCYTES 11 4.0 - 12.0 %    EOSINOPHILS 3 0.5 - 7.8 %    BASOPHILS 0 0.0 - 2.0 %    IMMATURE GRANULOCYTES 0 0.0 - 5.0 %    ABS. NEUTROPHILS 3.1 1.7 - 8.2 K/UL    ABS. LYMPHOCYTES 2.6 0.5 - 4.6 K/UL    ABS. MONOCYTES 0.7 0.1 - 1.3 K/UL    ABS. EOSINOPHILS 0.2 0.0 - 0.8 K/UL    ABS. BASOPHILS 0.0 0.0 - 0.2 K/UL    ABS. IMM. GRANS. 0.0 0.0 - 0.5 K/UL   GLUCOSE, POC    Collection Time: 07/26/21  7:36 AM   Result Value Ref Range    Glucose (POC) 66 65 - 100 mg/dL    Performed by [facility]    GLUCOSE, POC    Collection Time: 07/26/21  8:37 AM   Result Value Ref Range    Glucose (POC) 108 (H) 65 - 100 mg/dL    Performed by Festus    GLUCOSE, POC    Collection Time: 07/26/21 10:34 AM   Result Value Ref Range    Glucose (POC) 104 (H) 65 - 100 mg/dL    Performed by Aric    GLUCOSE, POC    Collection Time: 07/26/21 12:19 PM   Result Value Ref Range    Glucose (POC) 109 (H) 65 - 100 mg/dL    Performed by [facility]        All Micro Results     None          SARS-CoV-2 Lab Results  \"Novel Coronavirus\" Test: No results found for: COV2NT   \"Emergent Disease\" Test: No results found for: EDPR  \"SARS-COV-2\" Test: No results found for: XGCOVT  Rapid Test:   Lab Results   Component Value Date/Time    COVR Not detected 04/15/2021 06:41 AM            Diagnostic Imaging/Tests:   No results found. Echocardiogram/EKG results:  No results found for this visit on 07/24/21.     EKG Results     Procedure 720 Value Units Date/Time    EKG, 12 LEAD, INITIAL [[id number]] Collected: 07/24/21 1353    Order Status: Completed Updated: 07/25/21 0609     Ventricular Rate 108 BPM      Atrial Rate 108 BPM      P-R Interval 164 ms      QRS Duration 82 ms      Q-T Interval 346 ms      QTC Calculation (Bezet) 463 ms      Calculated P Axis 89 degrees Calculated R Axis 11 degrees      Calculated T Axis 71 degrees      Diagnosis --       Sinus tachycardia  Otherwise normal ECG  When compared with ECG of 09-APR-2021 11:01,  Non-specific change in ST segment in Inferior leads  Nonspecific T wave abnormality no longer evident in Lateral leads  Confirmed by Shayy Jones ([id number]) on 7/25/2021 6:09:20 AM            Results for orders placed or performed during the hospital encounter of 07/24/21   EKG, 12 LEAD, INITIAL   Result Value Ref Range    Ventricular Rate 108 BPM    Atrial Rate 108 BPM    P-R Interval 164 ms    QRS Duration 82 ms    Q-T Interval 346 ms    QTC Calculation (Bezet) 463 ms    Calculated P Axis 89 degrees    Calculated R Axis 11 degrees    Calculated T Axis 71 degrees    Diagnosis         Sinus tachycardia  Otherwise normal ECG  When compared with ECG of 09-APR-2021 11:01,  Non-specific change in ST segment in Inferior leads  Nonspecific T wave abnormality no longer evident in Lateral leads  Confirmed by Shayy Jones ([id number]) on 7/25/2021 6:09:20 AM         Procedures done this admission:  * No surgery found *        Time spent in patient discharge planning and coordination 33 minutes.       Signed By: Kyler Arechiga MD   [employer] [facility]    July 26, 2021  1:42 PM

## 2021-07-26 NOTE — PROGRESS NOTES
Dc education complete with patient including new rx's, follow-up appointments, and home care (see AVS). Patient states next dialysis is on Wednesday and she will go. New scripts for gabepentin and norco did not print upon dc reconciliation. Provider paged. Patient did not want to wait for response. RN to call once response received. IV dc'd per other RN. Patient dc'd via wheelchair.

## 2021-07-26 NOTE — PROGRESS NOTES
Pt to d/c home today. She told CM she \"will call her daughter\" to come pick her up. No HH or DME recommended by PT. Pt will resume her regular HD dialysis at [facility] - chair time [id number]. CM called the dialysis clinic to verify if they need any updated information and they do not. She has transportation to and from dialysis. No supportive care needs identified. CM will continue to follow plan of care. Care Management Interventions  PCP Verified by CM: Yes (Jossy Adams DO)  Mode of Transport at Discharge: Other (see comment) (Family)  Transition of Care Consult (CM Consult): Discharge Planning  Discharge Durable Medical Equipment: No  Physical Therapy Consult: Yes  Occupational Therapy Consult: Yes  Speech Therapy Consult: No  Current Support Network: Other (Pt resides in an apartment with her daughter)  Confirm Follow Up Transport: Family  The Plan for Transition of Care is Related to the Following Treatment Goals : Pt to obtain care to become medically stable and to return with a safe transition.   The Patient and/or Patient Representative was Provided with a Choice of Provider and Agrees with the Discharge Plan?: Yes  Name of the Patient Representative Who was Provided with a Choice of Provider and Agrees with the Discharge Plan: Rakan Atkins  Freedom of Choice List was Provided with Basic Dialogue that Supports the Patient's Individualized Plan of Care/Goals, Treatment Preferences and Shares the Quality Data Associated with the Providers?: Yes  Leon Resource Information Provided?: No  Discharge Location  Discharge Placement: Home with family assistance

## 2021-07-26 NOTE — PROGRESS NOTES
Pt has been off of the floor all morning for dialysis. Chart screened by CM for d/c planning. Pt resides in a first floor apartment with her daughter Pattie Medrano. Pattie Medrano drives pt to her appointments. Pt has insurance with pharmacy benefits and her PCP is JOVANY Donnelly DO at [address] [facility] [place]. CM updated PCP in pt's chart. Pt receives HD at [facility] - chair time 1740. 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 PT consult was ordered for pt and progress note states no further skilled therapy needed upon d/c. CM will speak with pt at the bedside to complete assessment once she returns to the floor. Current d/c plan is for pt to return home with her daughter and continue HD as scheduled. CM met with pt at the bedside to discuss d/c planning. Proper PPE and social distancing were observed. Pt appeared aggravated and answered CM's questions but was chana. Pt states she does not have any home DME and is not receiving any HH services. She states her daughter is working on \"getting her a nurse\" - a CLTC aide. Demographics confirmed. Pt stated she has a family member who can provide transportation home when she is d/c. No needs identified. CM will continue to follow and remain available if any needs arise. Care Management Interventions  PCP Verified by CM: Yes (Jossy Adams DO)  Mode of Transport at Discharge: Other (see comment) (Family)  Transition of Care Consult (CM Consult): Discharge Planning  Physical Therapy Consult: Yes  Occupational Therapy Consult: Yes  Speech Therapy Consult: No  Current Support Network:  Other (Pt resides in an apartment with her daughter)  Confirm Follow Up Transport: Family  The Patient and/or Patient Representative was Provided with a Choice of Provider and Agrees with the Discharge Plan?: Yes  Name of the Patient Representative Who was Provided with a Choice of Provider and Agrees with the Discharge Plan: Mynor Conte  Freedom of Choice List was Provided with Basic Dialogue that Supports the Patient's Individualized Plan of Care/Goals, Treatment Preferences and Shares the Quality Data Associated with the Providers?: Yes   Resource Information Provided?: No  Discharge Location  Discharge Placement: Home with family assistance

## 2021-07-26 NOTE — PROGRESS NOTES
Renal Progress Note    Admission Date: 7/24/2021   Follow up ESRD  Subjective:   Patient seen and examined on HD, dialyzing via right  Qb, UF 2600, tolerating dialysis, N/V/D- improving reported tolerating PO intake.      ROS:  General: no fever/chills, appetite ok   CV: no CP  Lung: no SOB, no cough  GI: N/V/D better  : no dysuria  Ext: no edema      Objective:     Physical Exam:    Patient Vitals for the past 8 hrs:   BP Temp Pulse Resp SpO2   07/26/21 0814 (!) 145/76  96     07/26/21 0735 125/70 98.7 °F (37.1 °C) 87 17 96 %   07/26/21 0229 118/65 98.6 °F (37 °C) 88 20 98 %      Gen: alert and oriented, NAD  HEENT: moist membranes  CV: regular rate, S1, S2, no rub   Lungs: Clear bilaterally  GI: soft, non tender, + BS  Extem: no edema   Access: right TCC -intact, LUE AVF immature, +bruit, +thrill     Current Facility-Administered Medications   Medication Dose Route Frequency    dextrose 40% (GLUTOSE) oral gel 1 Tube  15 g Oral PRN    glucagon (GLUCAGEN) injection 1 mg  1 mg IntraMUSCular PRN    dextrose (D50W) injection syrg 12.5-25 g  25-50 mL IntraVENous PRN    diphenhydrAMINE (BENADRYL) capsule 25 mg  25 mg Oral Q6H PRN    sodium chloride (NS) flush 5-10 mL  5-10 mL IntraVENous Q8H    sodium chloride (NS) flush 5-10 mL  5-10 mL IntraVENous PRN    carvediloL (COREG) tablet 25 mg  25 mg Oral BID WITH MEALS    famotidine (PEPCID) tablet 20 mg  20 mg Oral DAILY    ferrous sulfate tablet 650 mg  650 mg Oral BID WITH MEALS    NIFEdipine ER (PROCARDIA XL) tablet 90 mg  90 mg Oral DAILY    sodium bicarbonate tablet 650 mg  650 mg Oral TID    insulin lispro (HUMALOG) injection 0-10 Units  0-10 Units SubCUTAneous AC&HS    sodium chloride (NS) flush 5-40 mL  5-40 mL IntraVENous Q8H    sodium chloride (NS) flush 5-40 mL  5-40 mL IntraVENous PRN    acetaminophen (TYLENOL) tablet 650 mg  650 mg Oral Q6H PRN    Or    acetaminophen (TYLENOL) suppository 650 mg  650 mg Rectal Q6H PRN    polyethylene glycol (MIRALAX) packet 17 g  17 g Oral DAILY PRN    ondansetron (ZOFRAN ODT) tablet 4 mg  4 mg Oral Q8H PRN    Or    ondansetron (ZOFRAN) injection 4 mg  4 mg IntraVENous Q6H PRN    heparin (porcine) injection 5,000 Units  5,000 Units SubCUTAneous Q8H    labetaloL (NORMODYNE;TRANDATE) injection 20 mg  20 mg IntraVENous Q6H PRN    heparin (porcine) 1,000 unit/mL injection 3,000 Units  3,000 Units Hemodialysis DIALYSIS PRN            Data Review:     LABS:   Recent Results (from the past 12 hour(s))   GLUCOSE, POC    Collection Time: 07/25/21  9:05 PM   Result Value Ref Range    Glucose (POC) 186 (H) 65 - 100 mg/dL    Performed by [facility]    METABOLIC PANEL, COMPREHENSIVE    Collection Time: 07/26/21  3:48 AM   Result Value Ref Range    Sodium 140 136 - 145 mmol/L    Potassium 4.4 3.5 - 5.1 mmol/L    Chloride 104 98 - 107 mmol/L    CO2 28 21 - 32 mmol/L    Anion gap 8 7 - 16 mmol/L    Glucose 93 65 - 100 mg/dL    BUN 39 (H) 8 - 23 MG/DL    Creatinine 7.27 (H) 0.6 - 1.0 MG/DL    GFR est AA 7 (L) >60 ml/min/1.73m2    GFR est non-AA 6 (L) >60 ml/min/1.73m2    Calcium 8.0 (L) 8.3 - 10.4 MG/DL    Bilirubin, total 0.2 0.2 - 1.1 MG/DL    ALT (SGPT) 20 12 - 65 U/L    AST (SGOT) 18 15 - 37 U/L    Alk.  phosphatase 81 50 - 136 U/L    Protein, total 7.2 6.3 - 8.2 g/dL    Albumin 3.0 (L) 3.2 - 4.6 g/dL    Globulin 4.2 (H) 2.3 - 3.5 g/dL    A-G Ratio 0.7 (L) 1.2 - 3.5     CBC WITH AUTOMATED DIFF    Collection Time: 07/26/21  3:48 AM   Result Value Ref Range    WBC 6.7 4.3 - 11.1 K/uL    RBC 3.79 (L) 4.05 - 5.2 M/uL    HGB 10.9 (L) 11.7 - 15.4 g/dL    HCT 34.4 (L) 35.8 - 46.3 %    MCV 90.8 79.6 - 97.8 FL    MCH 28.8 26.1 - 32.9 PG    MCHC 31.7 31.4 - 35.0 g/dL    RDW 14.7 (H) 11.9 - 14.6 %    PLATELET 920 (L) 107 - 450 K/uL    MPV 11.6 9.4 - 12.3 FL    ABSOLUTE NRBC 0.00 0.0 - 0.2 K/uL    DF AUTOMATED      NEUTROPHILS 47 43 - 78 %    LYMPHOCYTES 39 13 - 44 %    MONOCYTES 11 4.0 - 12.0 %    EOSINOPHILS 3 0.5 - 7.8 % BASOPHILS 0 0.0 - 2.0 %    IMMATURE GRANULOCYTES 0 0.0 - 5.0 %    ABS. NEUTROPHILS 3.1 1.7 - 8.2 K/UL    ABS. LYMPHOCYTES 2.6 0.5 - 4.6 K/UL    ABS. MONOCYTES 0.7 0.1 - 1.3 K/UL    ABS. EOSINOPHILS 0.2 0.0 - 0.8 K/UL    ABS. BASOPHILS 0.0 0.0 - 0.2 K/UL    ABS. IMM. GRANS. 0.0 0.0 - 0.5 K/UL   GLUCOSE, POC    Collection Time: 07/26/21  7:36 AM   Result Value Ref Range    Glucose (POC) 66 65 - 100 mg/dL    Performed by Odell          Plan:     Principal Problem:    Hyperkalemia (7/24/2021)    Active Problems:    Hypertension (4/9/2021)      Diabetes mellitus type 2, controlled ([facility] (4/9/2021)      ESRD (end stage renal disease) ([facility] (7/24/2021)      ESRD [address] MWF  Missed tx and initially with hyperkalemia. Some elevated BP but no pulmonary edema on admission. GI symptoms kept her from treatment- at this point resolved. Question a gastroenteritis. Seen on HD, tolerating dialysis.      Hyperkalemia -resolved with RRT    DM SSI     HTN stable

## 2021-07-26 NOTE — DIALYSIS
TRANSFER OUT- DIALYSIS    Hemodialysis treatment completed without complications. Patient alert and VS stable  /95  P 95       2 Kgs removed. Flushed both ports with 10 mL of NS.  CVC dressing clean, dry, and intact, tego caps intact, bilateral lumens wrapped with 4x4 gauze. Meds given-0. 0 units of RBCs given during dialysis. Patient to 502 after dialysis.

## 2021-08-21 NOTE — ED NOTES
I have reviewed discharge instructions with the patient. The patient verbalized understanding. Patient left ED via Discharge Method: ambulatory to Home with daughter    Opportunity for questions and clarification provided. Patient given 1 scripts. To continue your aftercare when you leave the hospital, you may receive an automated call from our care team to check in on how you are doing. This is a free service and part of our promise to provide the best care and service to meet your aftercare needs.  If you have questions, or wish to unsubscribe from this service please call [phone]. Thank you for Choosing our [employer] Emergency Department.

## 2021-08-21 NOTE — DISCHARGE INSTRUCTIONS
It is important for you to follow-up with your primary care doctor and continue to go to your dialysis as planned. Please return with any fevers, vomiting, difficulty breathing, worsening symptoms, or additional concerns.

## 2021-08-21 NOTE — ED PROVIDER NOTES
57-year-old lady presents with concerns about feeling weak and dizzy after getting dialysis earlier today. She normally goes on Mondays, Wednesdays, and Fridays. She said after her run today she felt a little dizzy and thought it would go away but it did not so she came to the emergency department. With this she has had no chest pain or shortness of breath. She had no nausea, vomiting, or diarrhea. She does have a history of diabetes but says that her doctors took her off all of her blood sugar medicines. No other associated symptoms. Elements of this note were created using speech recognition software. As such, errors of speech recognition may be present. Past Medical History:   Diagnosis Date    Asthma     Diabetes ([facility] [address]     niddm    Diabetes mellitus type 2, controlled ([facility] [address] 4/9/2021    Hypertension        Past Surgical History:   Procedure Laterality Date    IR INSERT TUNL CVC W/O PORT OVER 5 YR  4/12/2021         Family History:   Problem Relation Age of Onset    No Known Problems Mother     No Known Problems Father        Social History     Socioeconomic History    Marital status: SINGLE     Spouse name: Not on file    Number of children: Not on file    Years of education: Not on file    Highest education level: Not on file   Occupational History    Not on file   Tobacco Use    Smoking status: Current Every Day Smoker     Packs/day: 1.00     Years: 32.00     Pack years: 32.00   Substance and Sexual Activity    Alcohol use: No    Drug use: No    Sexual activity: Not on file   Other Topics Concern    Not on file   Social History Narrative    Not on file     Social Determinants of Health     Financial Resource Strain:     Difficulty of Paying Living Expenses:    Food Insecurity:     Worried About Running Out of Food in the Last Year:     Ran Out of Food in the Last Year:    Transportation Needs:     Lack of Transportation (Medical):      Lack of Transportation (Non-Medical):    Physical Activity:     Days of Exercise per Week:     Minutes of Exercise per Session:    Stress:     Feeling of Stress :    Social Connections:     Frequency of Communication with Friends and Family:     Frequency of Social Gatherings with Friends and Family:     Attends Christian Services:     Active Member of Clubs or Organizations:     Attends Club or Organization Meetings:     Marital Status:    Intimate Partner Violence:     Fear of Current or Ex-Partner:     Emotionally Abused:     Physically Abused:     Sexually Abused: ALLERGIES: Patient has no known allergies. Review of Systems   Constitutional: Negative for chills and fever. HENT: Negative for congestion and rhinorrhea. Respiratory: Negative for cough and shortness of breath. Cardiovascular: Negative for chest pain and palpitations. Gastrointestinal: Negative for diarrhea, nausea and vomiting. Neurological: Positive for dizziness and light-headedness. Negative for weakness and numbness. Psychiatric/Behavioral: Negative for confusion. Vitals:    08/20/21 1841 08/20/21 2244   BP: 106/74 (!) 101/58   Pulse: 83 83   Resp: 19 16   Temp: 97.4 °F (36.3 °C)    SpO2: 99% 97%   Weight: 69.9 kg (154 lb)    Height: 5' 3\" (1.6 m)             Physical Exam  Vitals and nursing note reviewed. Constitutional:       Appearance: Normal appearance. HENT:      Head: Normocephalic and atraumatic. Cardiovascular:      Rate and Rhythm: Normal rate and regular rhythm. Pulmonary:      Effort: Pulmonary effort is normal.      Breath sounds: Normal breath sounds. Abdominal:      General: Bowel sounds are normal.      Palpations: Abdomen is soft. Skin:     General: Skin is warm and dry. Neurological:      General: No focal deficit present. Mental Status: She is alert and oriented to person, place, and time.           MDM  Number of Diagnoses or Management Options  Diagnosis management comments: Patient's blood sugar is moderately elevated which may be contributing to her symptoms. I will give her a small dose of insulin striping that down a little bit. Her other blood work was unremarkable for her. I do not think she needs a CT scan at this time. She is not having any chest pain or pressure and I do not think she needs troponins. Amount and/or Complexity of Data Reviewed  Decide to obtain previous medical records or to obtain history from someone other than the patient: yes      ED Course as of Aug 21 0054   Sat Aug 21, 2021   0050 Patient's evaluation is unremarkable.   I will discharge her home and restart her glipizide.    [AC]      ED Course User Index  [AC] Cecy Ramsay MD       Procedures

## 2021-09-13 NOTE — ED TRIAGE NOTES
Patient given Rx for glasses. Went to dialysis today and came back home feeling sick. Reports headache, dizziness,.  Denies CP, Shob, abd, n/v/d.

## 2021-09-17 PROBLEM — U07.1 COVID-19: Status: ACTIVE | Noted: 2021-01-01

## 2021-09-17 NOTE — PROGRESS NOTES
INITIAL SPIRITUAL ASSESSMENT     NOTED:      PATIENT IS BEING ADMITTED TO FLOOR FROM ER      COVID    ESRD    Mandaen    SON - TERESA    CONFUSION        WILL ASSESS HOW WE CAN BEST SERVE THIS FAMILY

## 2021-09-17 NOTE — H&P
INTERNAL MEDICINE H&P/CONSULT    Subjective:     *years old female with history of *, presents with *for last *days. 57 y.o. female w/ hx of ESRD, asthma, DM and HTN, who presents to the ED with a chief complaint of confusion. Family states that she just moved and told the dialysis company the wrong address they took her to the wrong house and she was sitting outside all night they could not locate her. She currently states she just feels bad all over. She knows she is at [address] and who she is. She has no focal weakness. Just generalized weakness. Daughter is very concerned that she has not ate well for the last 4 days. Daughter reports that she thought she felt warm no fever at our triage note. No vomiting or diarrhea. On further questioning, pt has sob and cough, no diarrhea, sick for 5-7 days, on 2L O2 during my exam.    Past Medical History:   Diagnosis Date    Asthma     COVID-19 9/17/2021    Diabetes ([facility] [address]     niddm    Diabetes mellitus type 2, controlled ([facility] [address] 4/9/2021    Hypertension       Past Surgical History:   Procedure Laterality Date    IR INSERT TUNL CVC W/O PORT OVER 5 YR  4/12/2021      Prior to Admission medications    Medication Sig Start Date End Date Taking? Authorizing Provider   glipiZIDE (GLUCOTROL) 5 mg tablet Take 0.5 Tablets by mouth daily for 30 days. 8/21/21 9/20/21  Ros Cheek MD   gabapentin (NEURONTIN) 100 mg capsule Take 1 Capsule by mouth three (3) times daily. 7/24/21   Lenore Murry III, MD   famotidine (Pepcid) 20 mg tablet Take 1 Tab by mouth daily. 4/16/21   Zaira Tramaine, NP   NIFEdipine ER (PROCARDIA XL) 90 mg ER tablet Take 1 Tab by mouth daily. 4/16/21   Zaira Tramaine NP   acetaminophen (TYLENOL) 325 mg tablet Take 2 Tabs by mouth every six (6) hours as needed for Pain or Fever. 4/15/21   Zaira Tramaine, NP   ferrous sulfate 325 mg (65 mg iron) tablet Take 2 Tabs by mouth two (2) times daily (with meals).  4/15/21 Radha Arias NP   lisinopriL (PRINIVIL, ZESTRIL) 20 mg tablet Take 1 Tab by mouth two (2) times a day. 4/15/21   Radha Arias NP   carvediloL (COREG) 25 mg tablet Take 25 mg by mouth two (2) times daily (with meals). Provider, Historical     No Known Allergies   Social History     Tobacco Use    Smoking status: Current Every Day Smoker     Packs/day: 1.00     Years: 32.00     Pack years: 32.00   Substance Use Topics    Alcohol use: No        Family History:  HTN    Review of Systems   A comprehensive review of systems was negative except for that written in the HPI. Objective: Intake / Output:  No intake/output data recorded. No intake/output data recorded. Physical Exam:  Visit Vitals  BP (!) 101/52 (BP 1 Location: Right upper arm, BP Patient Position: At rest)   Pulse 82   Temp 99.2 °F (37.3 °C)   Resp 24   Ht 5' 3\" (1.6 m)   Wt 69.9 kg (154 lb)   SpO2 97%   BMI 27.28 kg/m²     General appearance: awake, alert, cooperative, moderate distress, appears stated age, pale  Head: Normocephalic, without obvious abnormality, atraumatic  Back: symmetric, no curvature. ROM normal. No CVA tenderness. Lungs: Diminished bs bibasilar. Heart: regular rate and rhythm, S1, S2 normal, no murmur, click, rub or gallop. Abdomen: soft, no tenderness, no distension, normal bowel sound, no masses, no organomegaly  Extremities: atraumatic, no cyanosis - Bilateral lower limbs edema none. Skin: No rashes or ulceration.   Neurologic: Grossly intact     ECG: sinus rhythm     Data Review (Labs):   Recent Results (from the past 24 hour(s))   EKG, 12 LEAD, INITIAL    Collection Time: 09/17/21  6:47 AM   Result Value Ref Range    Ventricular Rate 88 BPM    Atrial Rate 88 BPM    P-R Interval 148 ms    QRS Duration 68 ms    Q-T Interval 374 ms    QTC Calculation (Bezet) 452 ms    Calculated P Axis 71 degrees    Calculated R Axis 3 degrees    Calculated T Axis 66 degrees    Diagnosis       Normal sinus rhythm  Normal ECG  When compared with ECG of 20-AUG-2021 18:43,  QT has shortened  Confirmed by Hoda Elizabeth MD (), NORMA ([id number]) on 9/17/2021 11:59:15 AM     CBC WITH AUTOMATED DIFF    Collection Time: 09/17/21  6:50 AM   Result Value Ref Range    WBC 6.5 4.3 - 11.1 K/uL    RBC 3.35 (L) 4.05 - 5.2 M/uL    HGB 9.9 (L) 11.7 - 15.4 g/dL    HCT 30.6 (L) 35.8 - 46.3 %    MCV 91.3 79.6 - 97.8 FL    MCH 29.6 26.1 - 32.9 PG    MCHC 32.4 31.4 - 35.0 g/dL    RDW 19.4 (H) 11.9 - 14.6 %    PLATELET 174 389 - 244 K/uL    MPV 12.8 (H) 9.4 - 12.3 FL    ABSOLUTE NRBC 0.00 0.0 - 0.2 K/uL    DF AUTOMATED      NEUTROPHILS 73 43 - 78 %    LYMPHOCYTES 15 13 - 44 %    MONOCYTES 11 4.0 - 12.0 %    EOSINOPHILS 0 (L) 0.5 - 7.8 %    BASOPHILS 0 0.0 - 2.0 %    IMMATURE GRANULOCYTES 1 0.0 - 5.0 %    ABS. NEUTROPHILS 4.8 1.7 - 8.2 K/UL    ABS. LYMPHOCYTES 1.0 0.5 - 4.6 K/UL    ABS. MONOCYTES 0.7 0.1 - 1.3 K/UL    ABS. EOSINOPHILS 0.0 0.0 - 0.8 K/UL    ABS. BASOPHILS 0.0 0.0 - 0.2 K/UL    ABS. IMM. GRANS. 0.0 0.0 - 0.5 K/UL   METABOLIC PANEL, COMPREHENSIVE    Collection Time: 09/17/21  6:50 AM   Result Value Ref Range    Sodium 134 (L) 136 - 145 mmol/L    Potassium 4.2 3.5 - 5.1 mmol/L    Chloride 94 (L) 98 - 107 mmol/L    CO2 29 21 - 32 mmol/L    Anion gap 11 7 - 16 mmol/L    Glucose 236 (H) 65 - 100 mg/dL    BUN 39 (H) 8 - 23 MG/DL    Creatinine 7.53 (H) 0.6 - 1.0 MG/DL    GFR est AA 7 (L) >60 ml/min/1.73m2    GFR est non-AA 6 (L) >60 ml/min/1.73m2    Calcium 8.0 (L) 8.3 - 10.4 MG/DL    Bilirubin, total 0.4 0.2 - 1.1 MG/DL    ALT (SGPT) 13 12 - 65 U/L    AST (SGOT) 26 15 - 37 U/L    Alk.  phosphatase 72 50 - 136 U/L    Protein, total 8.1 6.3 - 8.2 g/dL    Albumin 3.3 3.2 - 4.6 g/dL    Globulin 4.8 (H) 2.3 - 3.5 g/dL    A-G Ratio 0.7 (L) 1.2 - 3.5     CK    Collection Time: 09/17/21  6:50 AM   Result Value Ref Range     (H) 21 - 215 U/L   MAGNESIUM    Collection Time: 09/17/21  6:50 AM   Result Value Ref Range    Magnesium 2.2 1.8 - 2.4 mg/dL   LACTIC ACID    Collection Time: 09/17/21  6:50 AM   Result Value Ref Range    Lactic acid 1.1 0.4 - 2.0 MMOL/L   PROCALCITONIN    Collection Time: 09/17/21  6:50 AM   Result Value Ref Range    Procalcitonin 3.36 ng/mL   COVID-19 RAPID TEST    Collection Time: 09/17/21  7:59 AM   Result Value Ref Range    Specimen source NASAL      COVID-19 rapid test Detected (AA) NOTD     SARS-COV-2    Collection Time: 09/17/21  7:59 AM   Result Value Ref Range    SARS-CoV-2 Please find results under separate order     GLUCOSE, POC    Collection Time: 09/17/21 10:30 AM   Result Value Ref Range    Glucose (POC) 267 (H) 65 - 100 mg/dL    Performed by AgostinoRNMary        Assessment:     1- Covid 19 pneumonia. Hx of asthma  2- Ac hypoxic resp failure, on 2L, due to #1  3- ESRD (end stage renal disease) on hemodialysis MWF  4- DM and HTN, controlled  5- Ac metabolic encephalopathy    Plan:     Decadron iv  Vitamins C/D, Zinc  Mucomyst po  Oxygen as needed  Dialysis, per nephrology  Blood pressure control  Insulin scale  AM lab as needed  Continue essential home medications. Patient is full code. Further management depends on patient progress. Thank you for the oppourtinity to contribute in the care of your patient. Time 35 minutes.     Signed By: Yamila Delarosa MD     September 17, 2021

## 2021-09-17 NOTE — PROGRESS NOTES
09/17/21 1706   Dual Skin Pressure Injury Assessment   Dual Skin Pressure Injury Assessment WDL   Second Care Provider (Based on [address] [place] [address]) Grady ALCANTARA

## 2021-09-17 NOTE — ED NOTES
TRANSFER - OUT REPORT:    Verbal report given to Radha Covington RN on Mihir Chow  being transferred to   for routine progression of care       Report consisted of patients Situation, Background, Assessment and   Recommendations(SBAR). Information from the following report(s) SBAR was reviewed with the receiving nurse. Lines:   Peripheral IV 09/17/21 Right Hand (Active)   Site Assessment Clean, dry, & intact 09/17/21 0653   Phlebitis Assessment 0 09/17/21 0653   Infiltration Assessment 0 09/17/21 0653   Dressing Status Clean, dry, & intact 09/17/21 0653   Dressing Type Transparent 09/17/21 0653   Hub Color/Line Status Pink 09/17/21 7286        Opportunity for questions and clarification was provided.       Patient transported with:   O2 @ 2 liters

## 2021-09-17 NOTE — ED TRIAGE NOTES
Pt arrives by wheelchair w/family. Family states Pt was at dialysis yesterday and dialysis [facility] dropped pt off at the wrong house-pt told [facility] Chapin wrong address. pt was found on a porch at someone elses house. pt moved and could not remember where she lived. pt was weak and fatigued at Dialysis and told her to come in but pt could not make it. Pt has been confused, coughing, feeling warm, denies n/v/d. Denies urinary sx. Denies constipation. Denies cp, sob. Has some generalized abdominal pain. Family States has not eaten in 4 days.  in triage.  NIH 0

## 2021-09-17 NOTE — PROGRESS NOTES
Pt admitted today from ED. Uneventful shift.     Bedside shift report given to Tila Wei, on-coming RN

## 2021-09-17 NOTE — ED PROVIDER NOTES
Mask was worn during the entire patient examination. Taras Lagos is a 57 y.o. female who presents to the ED with a chief complaint of confusion. Family states that she just moved and told the dialysis company the wrong address they took her to the wrong house and she was sitting outside all night they could not locate her. She currently states she just feels bad all over. She knows she is at [address] and who she is. She has no focal weakness. Just generalized weakness. Daughter is very concerned that she has not ate well for the last 4 days. Daughter reports that she thought she felt warm no fever at our triage note. No vomiting or diarrhea. The history is provided by the patient and a relative.         Past Medical History:   Diagnosis Date    Asthma     Diabetes ([facility] [address]     niddm    Diabetes mellitus type 2, controlled ([facility] [address] [facility] 4/9/2021    Hypertension        Past Surgical History:   Procedure Laterality Date    IR INSERT TUNL CVC W/O PORT OVER 5 YR  4/12/2021         Family History:   Problem Relation Age of Onset    No Known Problems Mother     No Known Problems Father        Social History     Socioeconomic History    Marital status: SINGLE     Spouse name: Not on file    Number of children: Not on file    Years of education: Not on file    Highest education level: Not on file   Occupational History    Not on file   Tobacco Use    Smoking status: Current Every Day Smoker     Packs/day: 1.00     Years: 32.00     Pack years: 32.00   Substance and Sexual Activity    Alcohol use: No    Drug use: No    Sexual activity: Not on file   Other Topics Concern    Not on file   Social History Narrative    Not on file     Social Determinants of Health     Financial Resource Strain:     Difficulty of Paying Living Expenses:    Food Insecurity:     Worried About Running Out of Food in the Last Year:     Will of Food in the Last Year:    Transportation Needs:     Lack of Transportation (Medical):  Lack of Transportation (Non-Medical):    Physical Activity:     Days of Exercise per Week:     Minutes of Exercise per Session:    Stress:     Feeling of Stress :    Social Connections:     Frequency of Communication with Friends and Family:     Frequency of Social Gatherings with Friends and Family:     Attends Gnosticism Services:     Active Member of Clubs or Organizations:     Attends Club or Organization Meetings:     Marital Status:    Intimate Partner Violence:     Fear of Current or Ex-Partner:     Emotionally Abused:     Physically Abused:     Sexually Abused: ALLERGIES: Patient has no known allergies. Review of Systems   Constitutional: Positive for activity change, appetite change and fatigue. Negative for chills and fever. HENT: Negative for congestion. Respiratory: Positive for cough. Negative for chest tightness, shortness of breath, wheezing and stridor. Cardiovascular: Negative for chest pain and palpitations. Gastrointestinal: Negative for abdominal pain. Musculoskeletal: Positive for arthralgias. Psychiatric/Behavioral: Positive for confusion. All other systems reviewed and are negative. Vitals:    09/17/21 0648   BP: (!) 151/80   Pulse: 90   Resp: 18   Temp: 99.6 °F (37.6 °C)   SpO2: 93%   Weight: 69.9 kg (154 lb)   Height: 5' 3\" (1.6 m)            Physical Exam  Vitals and nursing note reviewed. Constitutional:       General: She is not in acute distress. Appearance: Normal appearance. She is well-developed. She is not toxic-appearing or diaphoretic. Comments: Appears older than stated age. HENT:      Head: Normocephalic and atraumatic. Eyes:      General: No scleral icterus. Conjunctiva/sclera: Conjunctivae normal.   Cardiovascular:      Rate and Rhythm: Normal rate and regular rhythm. Heart sounds: No murmur heard. No friction rub. No gallop.     Pulmonary:      Effort: Pulmonary effort is normal. No respiratory distress. Breath sounds: No stridor. No wheezing or rales. Abdominal:      Tenderness: There is no abdominal tenderness. There is no guarding or rebound. Hernia: No hernia is present. Musculoskeletal:      Cervical back: No rigidity or tenderness. Skin:     Capillary Refill: Capillary refill takes less than 2 seconds. Coloration: Skin is not jaundiced or pale. Neurological:      General: No focal deficit present. Mental Status: She is alert and oriented to person, place, and time. Mental status is at baseline. Comments: Equal strength in all extremities. No drift clear speech   Psychiatric:         Mood and Affect: Mood normal.         Behavior: Behavior normal.          MDM  Number of Diagnoses or Management Options  Diagnosis management comments: Patient oxygen level began to drop and chest x-ray concerning for some bilateral infiltrate. Her rapid Covid test was positive and she is not vaccinated given her chronic medical conditions including dialysis is high risk for poor outcome and messaging the hospitalist for admission given Decadron and she is currently on 2 L of oxygen. Isabela Collado MD; 9/17/2021 @9:03 AM Voice dictation software was used during the making of this note. This software is not perfect and grammatical and other typographical errors may be present.   This note has not been proofread for errors.  ===================================================================          Amount and/or Complexity of Data Reviewed  Clinical lab tests: ordered and reviewed (Results for orders placed or performed during the hospital encounter of 09/17/21  -COVID-19 RAPID TEST       Result                      Value             Ref Range           Specimen source             NASAL                                 COVID-19 rapid test         Detected (AA)     NOTD           -CBC WITH AUTOMATED DIFF       Result                      Value             Ref Range           WBC                         6.5               4.3 - 11.1 K*       RBC                         3.35 (L)          4.05 - 5.2 M*       HGB                         9.9 (L)           11.7 - 15.4 *       HCT                         30.6 (L)          35.8 - 46.3 %       MCV                         91.3              79.6 - 97.8 *       MCH                         29.6              26.1 - 32.9 *       MCHC                        32.4              31.4 - 35.0 *       RDW                         19.4 (H)          11.9 - 14.6 %       PLATELET                    160               150 - 450 K/*       MPV                         12.8 (H)          9.4 - 12.3 FL       ABSOLUTE NRBC               0.00              0.0 - 0.2 K/*       DF                          AUTOMATED                             NEUTROPHILS                 73                43 - 78 %           LYMPHOCYTES                 15                13 - 44 %           MONOCYTES                   11                4.0 - 12.0 %        EOSINOPHILS                 0 (L)             0.5 - 7.8 %         BASOPHILS                   0                 0.0 - 2.0 %         IMMATURE GRANULOCYTES       1                 0.0 - 5.0 %         ABS. NEUTROPHILS            4.8               1.7 - 8.2 K/*       ABS. LYMPHOCYTES            1.0               0.5 - 4.6 K/*       ABS. MONOCYTES              0.7               0.1 - 1.3 K/*       ABS. EOSINOPHILS            0.0               0.0 - 0.8 K/*       ABS. BASOPHILS              0.0               0.0 - 0.2 K/*       ABS. IMM.  GRANS.            0.0               0.0 - 0.5 K/*  -METABOLIC PANEL, COMPREHENSIVE       Result                      Value             Ref Range           Sodium                      134 (L)           136 - 145 mm*       Potassium                   4.2               3.5 - 5.1 mm*       Chloride                    94 (L)            98 - 107 mmo*       CO2                         29                21 - 32 mmol*       Anion gap                   11                7 - 16 mmol/L       Glucose                     236 (H)           65 - 100 mg/*       BUN                         39 (H)            8 - 23 MG/DL        Creatinine                  7.53 (H)          0.6 - 1.0 MG*       GFR est AA                  7 (L)             >60 ml/min/1*       GFR est non-AA              6 (L)             >60 ml/min/1*       Calcium                     8.0 (L)           8.3 - 10.4 M*       Bilirubin, total            0.4               0.2 - 1.1 MG*       ALT (SGPT)                  13                12 - 65 U/L         AST (SGOT)                  26                15 - 37 U/L         Alk.  phosphatase            72                50 - 136 U/L        Protein, total              8.1               6.3 - 8.2 g/*       Albumin                     3.3               3.2 - 4.6 g/*       Globulin                    4.8 (H)           2.3 - 3.5 g/*       A-G Ratio                   0.7 (L)           1.2 - 3.5      -CK       Result                      Value             Ref Range           CK                          388 (H)           21 - 215 U/L   -MAGNESIUM       Result                      Value             Ref Range           Magnesium                   2.2               1.8 - 2.4 mg*  -LACTIC ACID       Result                      Value             Ref Range           Lactic acid                 1.1               0.4 - 2.0 MM*  -PROCALCITONIN       Result                      Value             Ref Range           Procalcitonin               3.36              ng/mL          -SARS-COV-2       Result                      Value             Ref Range           SARS-CoV-2                                                    Please find results under separate order  -EKG, 12 LEAD, INITIAL       Result                      Value             Ref Range           Ventricular Rate            88                BPM                 Atrial Rate 88                BPM                 P-R Interval                148               ms                  QRS Duration                68                ms                  Q-T Interval                374               ms                  QTC Calculation (Bezet)     452               ms                  Calculated P Axis           71                degrees             Calculated R Axis           3                 degrees             Calculated T Axis           66                degrees             Diagnosis                                                     Normal sinus rhythm   Normal ECG   When compared with ECG of 20-AUG-2021 18:43,   QT has shortened    )  Tests in the radiology section of CPT®: ordered and reviewed (CT HEAD WO CONT    Result Date: 9/17/2021  EXAM: CT HEAD WITHOUT CONTRAST INDICATION: confusion. COMPARISON: None. TECHNIQUE: Contiguous axial images were obtained from the skull base through the vertex without IV contrast. Radiation dose reduction techniques were used for this study. Our CT scanners use one or all of the following: Automated exposure control, adjustment of the mA and/or kV according to patient size, iterative reconstruction. FINDINGS: Normal appearance of the brain parenchyma without evidence of acute infarction, hemorrhage, or mass lesion. No hydrocephalus or midline shift. No extra-axial mass or hemorrhage. The basal cisterns are patent. The visualized portions of the orbits are normal. The mastoid air cells and paranasal sinuses are patent. The visualized vascular structures have an unremarkable noncontrast appearance. The calvarium and soft tissues appear normal.     No acute intracranial abnormality evident by CT. XR CHEST PORT    Result Date: 9/17/2021  EXAM: CHEST X-RAY, 1 VIEW INDICATION: fatigue. COMPARISON: 4/9/2021 TECHNIQUE: Single AP view of the chest was obtained. FINDINGS: Mild airspace opacification in both lung bases. No pneumothorax or pleural effusion.  The cardiomediastinal silhouette is within normal limits. The osseous structures are unremarkable.      Mild bibasilar airspace opacities reflecting atelectasis or infiltrates.   )  Independent visualization of images, tracings, or specimens: yes (Normal sinus rhythm, narrow QRS complex, no bundle branch blocks, and no ST segment elevation )           Procedures

## 2021-09-17 NOTE — PROGRESS NOTES
TRANSFER - IN REPORT:    Verbal report received from [facility] on Mihir Chow  being received from [facility] for routine progression of care      Report consisted of patients Situation, Background, Assessment and   Recommendations(SBAR). Information from the following report(s) ED Summary and Recent Results was reviewed with the receiving nurse. Opportunity for questions and clarification was provided. Assessment to be completed upon patients arrival to unit and care will be assumed.

## 2021-09-17 NOTE — CONSULTS
[address] Nephrology Consult Note    Subjective:     Mihir Chow is a 57 y.o.  female who is being seen for ESRD. She has ESRD and is on HD at [address] [place] [address]. She missed Monday  But dialyzed yesterday as a make up tx. Clinic reports she had a fever after tx and was instructed to get a covid test.  [facility] ER records Family states that she just moved and told the transportation companty the wrong address. so  they took her to the wrong house and she was sitting outside all night they could not locate her. In ED Rapid Covid Test was POSITIVE. She complains of generalized fatigue. She is on oxygen but says not she feels better No fever, chills, sweats, epistaxis, vision changes, headache, shortness of breath, wheezing,  chest pain, palpitations. No nausea/vomitting, diarrhea or constipation. No dysuria, hematuria. No paresthesia, seizure history, no arthritis/ arthralgia or skin rashes.      Past Medical History:   Diagnosis Date    Asthma     COVID-19 9/17/2021    Diabetes ([facility] [address]     niddm    Diabetes mellitus type 2, controlled ([facility] [address] 4/9/2021    Hypertension       Past Surgical History:   Procedure Laterality Date    IR INSERT TUNL CVC W/O PORT OVER 5 YR  4/12/2021     Family History   Problem Relation Age of Onset    No Known Problems Mother     No Known Problems Father       Social History     Tobacco Use    Smoking status: Current Every Day Smoker     Packs/day: 1.00     Years: 32.00     Pack years: 32.00   Substance Use Topics    Alcohol use: No       Current Facility-Administered Medications   Medication Dose Route Frequency Provider Last Rate Last Admin    insulin lispro (HUMALOG) injection   SubCUTAneous AC&HS Jerardo Campoverde MD   6 Units at 09/17/21 1038    cloNIDine HCL (CATAPRES) tablet 0.2 mg  0.2 mg Oral BID PRN Jerardo Campoverde MD        hydrALAZINE (APRESOLINE) 20 mg/mL injection 10 mg  10 mg IntraVENous Q6H PRN MD Maria Parada albuterol-ipratropium (DUO-NEB) 2.5 MG-0.5 MG/3 ML  3 mL Nebulization Q4H PRN Aurea Santos MD        sodium chloride (NS) flush 5-40 mL  5-40 mL IntraVENous Q8H Aurea Santos MD        sodium chloride (NS) flush 5-40 mL  5-40 mL IntraVENous PRN Aurea Santos MD        acetaminophen (TYLENOL) tablet 650 mg  650 mg Oral Q6H PRN Aurea Santos MD        Or   [facility] acetaminophen (TYLENOL) suppository 650 mg  650 mg Rectal Q6H PRN Aurea Santos MD        senna (SENOKOT) tablet 17.2 mg  2 Tablet Oral BID PRN Aurea Santos MD        ondansetron [facility]) injection 4 mg  4 mg IntraVENous Q6H PRN Aurea Santos MD        heparin (porcine) injection 5,000 Units  5,000 Units SubCUTAneous Q12H Aurea Santos MD   5,000 Units at 09/17/21 1037    temazepam (RESTORIL) capsule 15 mg  15 mg Oral QHS PRN Aurea Santos MD        guaiFENesin-codeine (ROBITUSSIN AC) 100-10 mg/5 mL solution 10 mL  10 mL Oral Q4H PRN Aurea Santos MD        ascorbic acid (vitamin C) (VITAMIN C) tablet 2,000 mg  2,000 mg Oral DAILY Aurea Santos MD        cholecalciferol (VITAMIN D3) (1000 Units /25 mcg) tablet 4,000 Units  4,000 Units Oral DAILY Aurea Santos MD        zinc sulfate (ZINCATE) 50 mg zinc (220 mg) capsule 1 Capsule  1 Capsule Oral DAILY Aurea Santos MD   1 Capsule at 09/17/21 1037    acetylcysteine (MUCOMYST) 200 mg/mL (20 %) solution 600 mg  600 mg Oral BID Aurea Santos MD       [facility] Kim Middleton ON 9/18/2021] dexamethasone (DECADRON) 10 mg/mL injection 6 mg  6 mg IntraVENous Q24H Aurea Santos MD         Current Outpatient Medications   Medication Sig Dispense Refill    glipiZIDE (GLUCOTROL) 5 mg tablet Take 0.5 Tablets by mouth daily for 30 days. 15 Tablet 0    gabapentin (NEURONTIN) 100 mg capsule Take 1 Capsule by mouth three (3) times daily. 60 Capsule 0    famotidine (Pepcid) 20 mg tablet Take 1 Tab by mouth daily. 30 Tab 0    NIFEdipine ER (PROCARDIA XL) 90 mg ER tablet Take 1 Tab by mouth daily.  27 Tab 0    acetaminophen (TYLENOL) 325 mg tablet Take 2 Tabs by mouth every six (6) hours as needed for Pain or Fever. 30 Tab 0    ferrous sulfate 325 mg (65 mg iron) tablet Take 2 Tabs by mouth two (2) times daily (with meals). 60 Tab 0    lisinopriL (PRINIVIL, ZESTRIL) 20 mg tablet Take 1 Tab by mouth two (2) times a day. 30 Tab 0    carvediloL (COREG) 25 mg tablet Take 25 mg by mouth two (2) times daily (with meals). No Known Allergies     Review of Systems:  A comprehensive review of systems was negative except for that written in the History of Present Illness. Objective:     Visit Vitals  BP (!) 103/56   Pulse 85   Temp 99.6 °F (37.6 °C)   Resp 30   Ht 5' 3\" (1.6 m)   Wt 69.9 kg (154 lb)   SpO2 99%   BMI 27.28 kg/m²        Physical Exam:   General appearance: no distress, appears stated age  Head: atraumatic  Eyes: conjunctivae/corneas clear. Nose: no discharge  Throat: Lips, mucosa, and tongue normal.   Neck: supple, symmetrical, trachea midline and no JVD  Lungs: clear to auscultation bilaterally  Heart: regular rate and rhythm, S1, S2, no pericardial friction rub  Abdomen: soft, non-tender.  Bowel sounds normal.   Extremities: No edema  Left arm AVF+ thrill   Skin:  No rashes or lesions          Data Review:   BMP:   Lab Results   Component Value Date/Time     (L) 09/17/2021 06:50 AM    K 4.2 09/17/2021 06:50 AM    CL 94 (L) 09/17/2021 06:50 AM    CO2 29 09/17/2021 06:50 AM    AGAP 11 09/17/2021 06:50 AM     (H) 09/17/2021 06:50 AM    BUN 39 (H) 09/17/2021 06:50 AM    CREA 7.53 (H) 09/17/2021 06:50 AM    GFRAA 7 (L) 09/17/2021 06:50 AM    GFRNA 6 (L) 09/17/2021 06:50 AM       CBC:    Lab Results   Component Value Date/Time    WBC 6.5 09/17/2021 06:50 AM    HGB 9.9 (L) 09/17/2021 06:50 AM    HCT 30.6 (L) 09/17/2021 06:50 AM     09/17/2021 06:50 AM            Assessment:     Principal Problem:    COVID-19 (9/17/2021)    Active Problems:    ESRD (end stage renal disease) [facility]) (7/24/2021)        Plan:     ESRD on Dialysis HD typically MWF but dialyzed yesterday    Covid admission  - treatment plan per hospitalist.  - comfortable on 2 Liters NC    Will plan on dialysis tomorrow and then back on schedule Monday .    Following volume and electrolytes closely    Signed By: Geraldine Fabian MD     September 17, 2021

## 2021-09-18 NOTE — PROGRESS NOTES
Pt known to CM from previous admission. Chart screened for d/c planning. Per chart screen pt has recently moved. She now resides in a one-story house with her daughter Adonis Wheeler. Prior to hospitalization pt was independent with ADLs and was not receiving any HH services or previous STR. Pt does not have any home DME. Pt does not drive and her daughterTifcelestey provides transportation. t has insurance with pharmacy benefits and a PCP. Pt receives HD at [facility] - chair time [id number]. She is transported to and from dialysis by a transport company. Pt is currently requiring 2L O2 NC.    *Please note: This excerpt is copied from the H&P: Family states that she just moved and told the dialysis company the wrong address they took her to the wrong house and she was sitting outside all night they could not locate her. Sloane De La Fuente is very concerned that she has not ate well for the last 4 days. Pt admitted with Dx of COVID-19 PNA, Hx asthma, Acute hypoxic respiratory failure due to COVID-19 virus, ESRD on HD MWF, DM-II, HTN, and Acute metabolic encephalopathy. Pt currently smokes 1 pack of cigarettes/day. Pt is receiving IP HD. PT and OT consults ordered to assess for further skilled therapy/DME needs. Current d/c plan is undetermined but most likely pt will return home with her daughter and continue OP HD. CM will continue to follow and remain available if any needs arise. Care Management Interventions  PCP Verified by CM: Yes (Jossy Adams DO)  Mode of Transport at Discharge:  Other (see comment) (Family)  Transition of Care Consult (CM Consult): Discharge Planning, Other (Continue OP HD)  Physical Therapy Consult: Yes  Occupational Therapy Consult: Yes  Speech Therapy Consult: No  Support Systems: Child(chichi)  Confirm Follow Up Transport: Family  The Patient and/or Patient Representative was Provided with a Choice of Provider and Agrees with the Discharge Plan?: Yes  Name of the Patient Representative Who was Provided with a Choice of Provider and Agrees with the Discharge Plan: Kirsty Mcclain  Freedom of Choice List was Provided with Basic Dialogue that Supports the Patient's Individualized Plan of Care/Goals, Treatment Preferences and Shares the Quality Data Associated with the Providers?: Yes   Resource Information Provided?: No  Discharge Location  Discharge Placement: Unable to determine at this time

## 2021-09-18 NOTE — ACP (ADVANCE CARE PLANNING)
Advance Care Planning     General Advance Care Planning (ACP) Conversation      Date of Conversation: 9/17/2021  Conducted with: Patient with Decision Making Capacity    Healthcare Decision Maker:     Primary Decision Maker: Max Romberg - Daughter - [phone]    Primary Decision Maker: Killian Turcios - Son - [phone]  Click here to complete [address] including selection of the Healthcare Decision Maker Relationship (ie \"Primary\")      Today we documented Decision Maker(s) consistent with Legal Next of Kin hierarchy. Content/Action Overview:   DECLINED ACP conversation - will revisit periodically   Reviewed DNR/DNI and patient elects Full Code (Attempt Resuscitation)  Topics discussed: Declined ACP conversation.   Additional Comments: N/A     Length of Voluntary ACP Conversation in minutes:  <16 minutes (Non-Billable)    Yelitza Blanco LMSW

## 2021-09-18 NOTE — PROGRESS NOTES
TRANSFER IN - DIALYSIS    Received patient in dialysis unit from [facility] (unit) for ordered procedure. Consent verified for renal replacement therapy. Patient alert and vital signs stable. Hemodialysis initiated using avf and 15 g needles. Machine settings per MD order. Heparin 3000 unit bolus . Will monitor during treatment.       09/18/21 0739   During Hemodialysis    Temp 98.8 °F (37.1 °C)   Pulse (Heart Rate) 83   Resp Rate 20   O2 Sat (%) 96 %   /71   MAP (Calculated) 92   Transducer Checks Dry

## 2021-09-18 NOTE — PROGRESS NOTES
INTERNAL MEDICINE    Subjective:     57 y.o. female w/ hx of ESRD, asthma, DM and HTN, who presents to the ED with a chief complaint of confusion. Family states that she just moved and told the dialysis company the wrong address they took her to the wrong house and she was sitting outside all night they could not locate her. She currently states she just feels bad all over. She knows she is at [address] and who she is. She has no focal weakness. Just generalized weakness. Daughter is very concerned that she has not ate well for the last 4 days. Daughter reports that she thought she felt warm no fever at our triage note. No vomiting or diarrhea. On further questioning, pt has sob and cough, no diarrhea, sick for 5-7 days, on 2L O2 during my exam.    Today, on 2L, had dialysis. No ac events. Objective: Intake / Output:  09/18 0701 - 09/18 1900  In: -   Out: 1000   No intake/output data recorded. Physical Exam:  Visit Vitals  BP (!) 141/67   Pulse 86   Temp 99.4 °F (37.4 °C)   Resp 18   Ht 5' 3\" (1.6 m)   Wt 69.9 kg (154 lb)   SpO2 100%   BMI 27.28 kg/m²     General appearance: awake, alert, cooperative, moderate distress, appears stated age, pale  Lungs: Diminished bs bibasilar. Heart: regular rate and rhythm, S1, S2 normal, no murmur, click, rub or gallop. Abdomen: soft, no tenderness, no distension, normal bowel sound, no masses, no organomegaly  Extremities: atraumatic, no cyanosis - Bilateral lower limbs edema none. Skin: No rashes or ulceration.   Neurologic: Grossly intact     ECG: sinus rhythm     Data Review (Labs):   Recent Results (from the past 24 hour(s))   GLUCOSE, POC    Collection Time: 09/17/21  4:56 PM   Result Value Ref Range    Glucose (POC) 282 (H) 65 - 100 mg/dL    Performed by Tyshawn    GLUCOSE, POC    Collection Time: 09/17/21  9:06 PM   Result Value Ref Range    Glucose (POC) 352 (H) 65 - 100 mg/dL    Performed by [employer] Sooligan GLUCOSE, POC    Collection Time: 09/18/21  6:05 AM   Result Value Ref Range    Glucose (POC) 286 (H) 65 - 100 mg/dL    Performed by [facility]    METABOLIC PANEL, BASIC    Collection Time: 09/18/21  7:55 AM   Result Value Ref Range    Sodium 132 (L) 136 - 145 mmol/L    Potassium 3.9 3.5 - 5.1 mmol/L    Chloride 93 (L) 98 - 107 mmol/L    CO2 25 21 - 32 mmol/L    Anion gap 14 7 - 16 mmol/L    Glucose 231 (H) 65 - 100 mg/dL    BUN 54 (H) 8 - 23 MG/DL    Creatinine 8.46 (H) 0.6 - 1.0 MG/DL    GFR est AA 6 (L) >60 ml/min/1.73m2    GFR est non-AA 5 (L) >60 ml/min/1.73m2    Calcium 7.9 (L) 8.3 - 10.4 MG/DL   CBC W/O DIFF    Collection Time: 09/18/21  7:55 AM   Result Value Ref Range    WBC 10.0 4.3 - 11.1 K/uL    RBC 3.13 (L) 4.05 - 5.2 M/uL    HGB 9.1 (L) 11.7 - 15.4 g/dL    HCT 27.7 (L) 35.8 - 46.3 %    MCV 88.5 79.6 - 97.8 FL    MCH 29.1 26.1 - 32.9 PG    MCHC 32.9 31.4 - 35.0 g/dL    RDW 18.5 (H) 11.9 - 14.6 %    PLATELET 907 819 - 878 K/uL    MPV 11.9 9.4 - 12.3 FL    ABSOLUTE NRBC 0.00 0.0 - 0.2 K/uL   GLUCOSE, POC    Collection Time: 09/18/21 10:51 AM   Result Value Ref Range    Glucose (POC) 176 (H) 65 - 100 mg/dL    Performed by Ameena    GLUCOSE, POC    Collection Time: 09/18/21 12:00 PM   Result Value Ref Range    Glucose (POC) 173 (H) 65 - 100 mg/dL    Performed by Rashaun        Assessment:     1- Covid 19 pneumonia.  Hx of asthma  2- Ac hypoxic resp failure, on 2L, due to #1  3- ESRD (end stage renal disease) on hemodialysis MWF  4- DM and HTN, controlled  5- Ac metabolic encephalopathy, improving    Plan:     Decadron iv  Vitamins C/D, Zinc  Mucomyst po  Oxygen as needed  Dialysis, per nephrology  Blood pressure control  Insulin scale  AM lab as needed    Dispo: TBD    Signed By: Facundo Limon MD     September 18, 2021

## 2021-09-18 NOTE — PROGRESS NOTES
TRANSFER OUT -DIALYSIS    Hemodialysis treatment completed without complications. Patient alert and stable. 1.0 Kgs removed. Needles X2 removed from access and manual pressure held until hemostasis complete and pressure dressing applied. Meds given  3000 unit bolus heparin . 0 units of RBCs given during dialysis. Patient to room 505 after dialysis.       09/18/21 1052   During Hemodialysis    Temp 98.8 °F (37.1 °C)   Pulse (Heart Rate) 85   /70   MAP (Calculated) 92   $$ Dialysis Charges   $$ Method Hemodialysis

## 2021-09-18 NOTE — CONSULTS
Please see my consult note from 9/17/21    [facility] Progress Note      Admission Date: 9/17/2021   Subjective: The patient was seen on dialysis at 9:12 AM .  BP is stable. Access is working well. In HD unit isolation room.   No shortness of breath or cough      Objective:     Physical Exam:    Patient Vitals for the past 8 hrs:   BP Temp Pulse Resp SpO2   09/18/21 0856 125/69  83     09/18/21 0836 134/71  84     09/18/21 0747 135/71  83 20    09/18/21 0739 135/71 98.8 °F (37.1 °C) 83 20 96 %   09/18/21 0315 (!) 123/59 98.8 °F (37.1 °C) 83 20 96 %      Gen: comfortable , NAD  HEENT: moist membranes  CV: S1, S2  Lungs: Clear bilaterally  Extem: no edema     Current Facility-Administered Medications   Medication Dose Route Frequency    insulin lispro (HUMALOG) injection 5 Units  5 Units SubCUTAneous TIDAC    insulin lispro (HUMALOG) injection   SubCUTAneous AC&HS    cloNIDine HCL (CATAPRES) tablet 0.2 mg  0.2 mg Oral BID PRN    hydrALAZINE (APRESOLINE) 20 mg/mL injection 10 mg  10 mg IntraVENous Q6H PRN    albuterol-ipratropium (DUO-NEB) 2.5 MG-0.5 MG/3 ML  3 mL Nebulization Q4H PRN    sodium chloride (NS) flush 5-40 mL  5-40 mL IntraVENous Q8H    sodium chloride (NS) flush 5-40 mL  5-40 mL IntraVENous PRN    acetaminophen (TYLENOL) tablet 650 mg  650 mg Oral Q6H PRN    Or    acetaminophen (TYLENOL) suppository 650 mg  650 mg Rectal Q6H PRN    senna (SENOKOT) tablet 17.2 mg  2 Tablet Oral BID PRN    ondansetron (ZOFRAN) injection 4 mg  4 mg IntraVENous Q6H PRN    heparin (porcine) injection 5,000 Units  5,000 Units SubCUTAneous Q12H    temazepam (RESTORIL) capsule 15 mg  15 mg Oral QHS PRN    guaiFENesin-codeine (ROBITUSSIN AC) 100-10 mg/5 mL solution 10 mL  10 mL Oral Q4H PRN    ascorbic acid (vitamin C) (VITAMIN C) tablet 2,000 mg  2,000 mg Oral DAILY    cholecalciferol (VITAMIN D3) (1000 Units /25 mcg) tablet 4,000 Units  4,000 Units Oral DAILY    zinc sulfate (ZINCATE) 50 mg zinc (220 mg) capsule 1 Capsule  1 Capsule Oral DAILY    acetylcysteine (MUCOMYST) 200 mg/mL (20 %) solution 600 mg  600 mg Oral BID    dexamethasone (DECADRON) 10 mg/mL injection 6 mg  6 mg IntraVENous Q24H            Data Review:     LABS:   Recent Results (from the past 12 hour(s))   METABOLIC PANEL, BASIC    Collection Time: 09/18/21  7:55 AM   Result Value Ref Range    Sodium 132 (L) 136 - 145 mmol/L    Potassium 3.9 3.5 - 5.1 mmol/L    Chloride 93 (L) 98 - 107 mmol/L    CO2 25 21 - 32 mmol/L    Anion gap 14 7 - 16 mmol/L    Glucose 231 (H) 65 - 100 mg/dL    BUN 54 (H) 8 - 23 MG/DL    Creatinine 8.46 (H) 0.6 - 1.0 MG/DL    GFR est AA 6 (L) >60 ml/min/1.73m2    GFR est non-AA 5 (L) >60 ml/min/1.73m2    Calcium 7.9 (L) 8.3 - 10.4 MG/DL   CBC W/O DIFF    Collection Time: 09/18/21  7:55 AM   Result Value Ref Range    WBC 10.0 4.3 - 11.1 K/uL    RBC 3.13 (L) 4.05 - 5.2 M/uL    HGB 9.1 (L) 11.7 - 15.4 g/dL    HCT 27.7 (L) 35.8 - 46.3 %    MCV 88.5 79.6 - 97.8 FL    MCH 29.1 26.1 - 32.9 PG    MCHC 32.9 31.4 - 35.0 g/dL    RDW 18.5 (H) 11.9 - 14.6 %    PLATELET 429 790 - 525 K/uL    MPV 11.9 9.4 - 12.3 FL    ABSOLUTE NRBC 0.00 0.0 - 0.2 K/uL         Plan:     Principal Problem:    COVID-19 (9/17/2021)    Active Problems:    ESRD (end stage renal disease) ([facility] [address] (7/24/2021)       ESRD on Dialysis HD typically MWF but off schedule.  Last tx was Thursday.  - HD today and then can get back on schedule matthew Gannon admission  - comfortable on 2 Liters NC  - decadron

## 2021-09-19 NOTE — PROGRESS NOTES
ACUTE OT GOALS:  (Developed with and agreed upon by patient and/or caregiver.)  1) Patient will complete lower body bathing and dressing with MOD I and adaptive equipment as needed. 2) Patient will complete toileting with SUPERVISION. 3) Patient will complete functional transfers with SUPERVISION and adaptive equipment as needed. 4) Patient will tolerate at least 25 minutes of OT activity with 1-2 rest breaks while maintaining O2 sats >90%. 5) Patient will verbalize at least 3 energy conservation technique to utilize during ADL/IADL. Timeframe: 7 visits     OCCUPATIONAL THERAPY ASSESSMENT: Initial Assessment and Daily Note OT Treatment Day # 1    Chayo Moore is a 57 y.o. female   PRIMARY DIAGNOSIS: COVID-19  COVID-19 [U07.1]       Reason for Referral:    ICD-10: Treatment Diagnosis: Generalized Muscle Weakness (M62.81)  INPATIENT: Pay[place] / Plan[place] / Product Type: Medicaid /   ASSESSMENT:     REHAB RECOMMENDATIONS:   Recommendation to date pending progress:  [address] [place] [address]  Equipment:    To Be Determined     PRIOR LEVEL OF FUNCTION:  (Prior to Hospitalization)  INITIAL/CURRENT LEVEL OF FUNCTION:  (Based on today's evaluation)   Bathing:   Modified Independent  Dressing:   Independent  Feeding/Grooming:   Independent  Toileting:   Independent  Functional Mobility:   Independent Bathing:   Minimal Assistance  Dressing:   Minimal Assistance  Feeding/Grooming:   Set Up  Toileting:   Contact Guard Assistance  Functional Mobility:   Contact Guard Assistance     ASSESSMENT:  Ms. Archie Luther is a 56 y/o female presents with COVID-19. Today pt presents with decreased activity tolerance, strength and balance impacting ADLs. Pt does not wear home O2 and is on 3L O2 NC today. Pt completed functional transfers in prep for ADLs, LE dressing and grooming ADLs in grid below.  Pt CGA no AD for mobility and transfers today, but fatigues quickly and reports feeling much weaker than normal. Pt O2 sats 86-91% throughout treatment. Pt educated on energy conservation techniques in prep for ADLs and pursed lip breathing during ADLs/mobility. Pt is currently functioning below baseline and would benefit from skilled OT services to address OT goals and plan of care. SUBJECTIVE:   Ms. Kei Kurtz states, \"Dialysis wears me out and I have to go again tomorrow. \"    SOCIAL HISTORY/LIVING ENVIRONMENT: lives with daughter, one level home, walk in shower w/c SC, no falls, goes to hemodialysis  Home Environment: Private residence  One/Two Story Residence: Two story  Living Alone: No  Support Systems: Child(chichi)    OBJECTIVE:     PAIN: VITAL SIGNS: LINES/DRAINS:   Pre Treatment: Pain Screen  Pain Scale 1: Numeric (0 - 10)  Pain Intensity 1: 0  Post Treatment: 0 Vital Signs  O2 Sat (%): 90 %  O2 Device: Nasal cannula  O2 Flow Rate (L/min): 3 l/min none  O2 Device: Nasal cannula     GROSS EVALUATION:  BUE Within Functional Limits Abnormal/ Functional Abnormal/ Non-Functional (see comments) Not Tested Comments:   AROM [x] [] [] []    PROM [] [] [] [x]    Strength [] [x] [] [] 4/5 BUE   Balance [] [x] [] [] CGA no AD   Posture [] [x] [] [] Forward head and rounded shoulders   Sensation [x] [] [] []    Coordination [] [x] [] []    Tone [] [] [] [x]    Edema [] [] [] [x]    Activity Tolerance [] [x] [] [] 3L O2 NC, reports feeling weaker than normal    [] [] [] []      COGNITION/  PERCEPTION: Intact Impaired   (see comments) Comments:   Orientation [x] []    Vision [x] []    Hearing [x] []    Judgment/ Insight [x] [x]    Attention [x] []    Memory [x] []    Command Following [x] []    Emotional Regulation [x] []     [] []      ACTIVITIES OF DAILY LIVING: I Mod I S SBA CGA Min Mod Max Total NT Comments   BASIC ADLs:              Bathing/ Showering [] [] [] [] [] [] [] [] [] [x]    Toileting [] [] [] [] [] [] [] [] [] [x]    Dressing [] [] [] [x] [] [] [] [] [] [] Owen slippers EOB   Feeding [] [] [] [] [] [] [] [] [] [x]    Grooming [] [] [x] [] [] [] [] [] [] [] Washing face in chair   Personal Device Care [] [] [] [] [] [] [] [] [] [x]    Functional Mobility [] [] [] [] [x] [] [] [] [] [] No DME   I=Independent, Mod I=Modified Independent, S=Supervision, SBA=Standby Assistance, CGA=Contact Guard Assistance,   Min=Minimal Assistance, Mod=Moderate Assistance, Max=Maximal Assistance, Total=Total Assistance, NT=Not Tested    MOBILITY: I Mod I S SBA CGA Min Mod Max Total  NT x2 Comments:   Supine to sit [] [] [] [x] [] [] [] [] [] [] []    Sit to supine [] [] [] [] [] [] [] [] [] [x] []    Sit to stand [] [] [] [] [x] [] [] [] [] [] [] No DME   Bed to chair [] [] [] [] [x] [] [] [] [] [] [] No DME   I=Independent, Mod I=Modified Independent, S=Supervision, SBA=Standby Assistance, CGA=Contact Guard Assistance,   Min=Minimal Assistance, Mod=Moderate Assistance, Max=Maximal Assistance, Total=Total Assistance, NT=Not Tested    [facility] [address] [address] AM-PAC 6 Clicks   Daily Activity Inpatient Short Form        How much help from another person does the patient currently need. .. Total A Lot A Little None   1. Putting on and taking off regular lower body clothing? [] 1   [] 2   [x] 3   [] 4   2. Bathing (including washing, rinsing, drying)? [] 1   [] 2   [x] 3   [] 4   3. Toileting, which includes using toilet, bedpan or urinal?   [] 1   [] 2   [x] 3   [] 4   4. Putting on and taking off regular upper body clothing? [] 1   [] 2   [] 3   [x] 4   5. Taking care of personal grooming such as brushing teeth? [] 1   [] 2   [] 3   [x] 4   6. Eating meals? [] 1   [] 2   [] 3   [x] 4   © 2007, Trustees of [address] [place] [address] [address] [id number], under license to [employer]. All rights reserved     Score:  Initial: 21 Most Recent: X (Date: -- )   Interpretation of Tool:  Represents activities that are increasingly more difficult (i.e. Bed mobility, Transfers, Gait).     PLAN:   FREQUENCY/DURATION: OT Plan of Care: 3 times/week for duration of hospital stay or until stated goals are met, whichever comes first.    PROBLEM LIST:   (Skilled intervention is medically necessary to address:)  1. Decreased ADL/Functional Activities  2. Decreased Activity Tolerance  3. Decreased Balance  4. Decreased Coordination  5. Decreased Gait Ability  6. Decreased Strength  7. Decreased Transfer Abilities   INTERVENTIONS PLANNED:   (Benefits and precautions of occupational therapy have been discussed with the patient.)  1. Self Care Training  2. Therapeutic Activity  3. Therapeutic Exercise/HEP  4. Neuromuscular Re-education  5. Education     TREATMENT:     EVALUATION: Low Complexity : (Untimed Charge)    TREATMENT:   ($$ Self Care/Home Management: 8-22 mins    )  Self Care (8 Minutes): Self care including Lower Body Dressing, Grooming, Energy Conservation Training and functional transfers in prep for ADLs to increase independence and decrease level of assistance required.     TREATMENT GRID:  N/A    AFTER TREATMENT POSITION/PRECAUTIONS:  Chair, Needs within reach and RN notified    INTERDISCIPLINARY COLLABORATION:  RN/PCT and OT/TOBIAS    TOTAL TREATMENT DURATION:  OT Patient Time In/Time Out  Time In: 2216  Time Out: [facility],

## 2021-09-19 NOTE — DISCHARGE SUMMARY
Physician Discharge Summary     Patient ID:  Indra Jaramillo  [id number]  24 y.o.  1960    Admit date: 9/17/2021    Discharge date: 9-    Diagnosis:  1- Covid 19 pneumonia. Hx of asthma. Improved. 2- Ac hypoxic resp failure, on 2L, due to #1. 3- ESRD (end stage renal disease) on hemodialysis MWF  4- DM and HTN, controlled  5- Ac metabolic encephalopathy, resolved.     Treated with:     Decadron  Vitamins C/D, Zinc  Mucomyst po  Oxygen as needed  Dialysis, per nephrology  Blood pressure control  Insulin scale    Hospital course:   57 y.o. female w/ hx of ESRD, asthma, DM and HTN, who presents to the ED with a chief complaint of confusion.  Family states that she just moved and told the dialysis company the wrong address they took her to the wrong house and she was sitting outside all night they could not locate her. Devang Parikh currently states she just feels bad all over. Devang Parikh knows she is at [address] and who she is. Devang Parikh has no focal weakness.  Just generalized weakness. Antonia Chavez is very concerned that she has not ate well for the last 4 days.  Daughter reports that she thought she felt warm no fever at our triage note.  No vomiting or diarrhea. On further questioning, pt has sob and cough, no diarrhea, sick for 5-7 days, on 2L O2 during my exam.  Patient admitted and treated as above. On day of discharge, patient exam showed occasional mild crackles, pt felt well, on 2L O2. PCP: Karen Like, DO    Patient Instructions:   Current Discharge Medication List      START taking these medications    Details   dexAMETHasone (DECADRON) 6 mg tablet Take 1 Tablet by mouth daily. Qty: 8 Tablet, Refills: 0         CONTINUE these medications which have NOT CHANGED    Details   glipiZIDE (GLUCOTROL) 5 mg tablet Take 0.5 Tablets by mouth daily for 30 days. Qty: 15 Tablet, Refills: 0      gabapentin (NEURONTIN) 100 mg capsule Take 1 Capsule by mouth three (3) times daily.   Qty: 60 Capsule, Refills: 0      famotidine (Pepcid) 20 mg tablet Take 1 Tab by mouth daily. Qty: 30 Tab, Refills: 0      NIFEdipine ER (PROCARDIA XL) 90 mg ER tablet Take 1 Tab by mouth daily. Qty: 30 Tab, Refills: 0      acetaminophen (TYLENOL) 325 mg tablet Take 2 Tabs by mouth every six (6) hours as needed for Pain or Fever. Qty: 30 Tab, Refills: 0      ferrous sulfate 325 mg (65 mg iron) tablet Take 2 Tabs by mouth two (2) times daily (with meals). Qty: 60 Tab, Refills: 0      lisinopriL (PRINIVIL, ZESTRIL) 20 mg tablet Take 1 Tab by mouth two (2) times a day. Qty: 30 Tab, Refills: 0      carvediloL (COREG) 25 mg tablet Take 25 mg by mouth two (2) times daily (with meals). Instructions:     Dialysis as scheduled, renal diabetic diet, activity as tolerated, isolation 2 weeks    Time 35 min  Please send copy to primary physician.     Signed:  Tabatha Marrufo MD  9/19/2021  11:24 AM

## 2021-09-19 NOTE — PROGRESS NOTES
Oxygen Qualifier       Room air: SpO2 with O2 and liter flow   Resting SpO2 92% 93% 3 L   Ambulating SpO2 87%  87% 1 L  88% 2 L  93% 3 L         Completed by:    Dona Ball, PTA

## 2021-09-19 NOTE — PROGRESS NOTES
Patient discharge:  Discharge instructions provided to patient. Prescription for Decadron given hard copy and she is aware to get it filled today so she can take her first dose in the am. Patient is aware of whom to call in case of emergency. She is aware she needs to call her primary care physician and make her follow up appointment. I spoke with her daughters and they will be here to pick her up within the next 20 minutes.

## 2021-09-19 NOTE — PROGRESS NOTES
Discharge order in. PT/OT were consulted. So far, OT is recommending HH. CM spoke with pt via phone and explained their recommendation. She declined services despite this writers encouragement. Pt is still on O2, 6 MWT was ordered. CM explained to the pt that if home oxygen is needed, this writer will provide with a portable tank. Pt reported understanding. Will update note when walk test is performed. 1251-Pt qualifies for home O2:    Oxygen Qualifier          Room air: SpO2 with O2 and liter flow   Resting SpO2 92% 93% 3 L   Ambulating SpO2 87%  87% 1 L  88% 2 L  93% 3 L     Will arrange a portable tank with [employer].     1426-Portable tank placed in pts room. Insurance paperwork faxed to [employer]. CM asked unit secretary to alert the pts nurse of the tank and asked that she show her how to use. No other d/c needs identified. Tx goals have been met. Care Management Interventions  PCP Verified by CM: Yes (Em Davis)  Mode of Transport at Discharge:  Other (see comment) (Family)  Transition of Care Consult (CM Consult): Discharge Planning  Discharge Durable Medical Equipment: No  Physical Therapy Consult: Yes  Occupational Therapy Consult: Yes  Speech Therapy Consult: No  Support Systems: Child(chichi), Other Family Member(s), Friend/Neighbor  Confirm Follow Up Transport: Family  The Plan for Transition of Care is Related to the Following Treatment Goals : Return home and back to her baseline  The Patient and/or Patient Representative was Provided with a Choice of Provider and Agrees with the Discharge Plan?: Yes  Name of the Patient Representative Who was Provided with a Choice of Provider and Agrees with the Discharge Plan: Patient  Freedom of Choice List was Provided with Basic Dialogue that Supports the Patient's Individualized Plan of Care/Goals, Treatment Preferences and Shares the Quality Data Associated with the Providers?: Yes  [place] Resource Information Provided?: No  Discharge Location  Discharge Placement: Home

## 2021-09-19 NOTE — CONSULTS
[facility] Nephrology Progress Note      Admission Date: 9/17/2021   Subjective:      Seen in room. Comfortable.   No shortness of breath      Objective:     Physical Exam:    Patient Vitals for the past 8 hrs:   BP Temp Pulse Resp SpO2   09/19/21 0745 (!) 154/79 99.6 °F (37.6 °C) 80 16 95 %   09/19/21 0329 (!) 155/75 99.6 °F (37.6 °C) 85 16 91 %      Gen: comfortable , NAD  HEENT: moist membranes  CV: S1, S2  Lungs: Clear bilaterally  Extem: no edema     Current Facility-Administered Medications   Medication Dose Route Frequency    insulin glargine (LANTUS) injection 12 Units  12 Units SubCUTAneous DAILY    insulin lispro (HUMALOG) injection 5 Units  5 Units SubCUTAneous TIDAC    insulin lispro (HUMALOG) injection   SubCUTAneous AC&HS    cloNIDine HCL (CATAPRES) tablet 0.2 mg  0.2 mg Oral BID PRN    hydrALAZINE (APRESOLINE) 20 mg/mL injection 10 mg  10 mg IntraVENous Q6H PRN    albuterol-ipratropium (DUO-NEB) 2.5 MG-0.5 MG/3 ML  3 mL Nebulization Q4H PRN    sodium chloride (NS) flush 5-40 mL  5-40 mL IntraVENous Q8H    sodium chloride (NS) flush 5-40 mL  5-40 mL IntraVENous PRN    acetaminophen (TYLENOL) tablet 650 mg  650 mg Oral Q6H PRN    Or    acetaminophen (TYLENOL) suppository 650 mg  650 mg Rectal Q6H PRN    senna (SENOKOT) tablet 17.2 mg  2 Tablet Oral BID PRN    ondansetron (ZOFRAN) injection 4 mg  4 mg IntraVENous Q6H PRN    heparin (porcine) injection 5,000 Units  5,000 Units SubCUTAneous Q12H    temazepam (RESTORIL) capsule 15 mg  15 mg Oral QHS PRN    guaiFENesin-codeine (ROBITUSSIN AC) 100-10 mg/5 mL solution 10 mL  10 mL Oral Q4H PRN    ascorbic acid (vitamin C) (VITAMIN C) tablet 2,000 mg  2,000 mg Oral DAILY    cholecalciferol (VITAMIN D3) (1000 Units /25 mcg) tablet 4,000 Units  4,000 Units Oral DAILY    zinc sulfate (ZINCATE) 50 mg zinc (220 mg) capsule 1 Capsule  1 Capsule Oral DAILY    acetylcysteine (MUCOMYST) 200 mg/mL (20 %) solution 600 mg  600 mg Oral BID    dexamethasone (DECADRON) 10 mg/mL injection 6 mg  6 mg IntraVENous Q24H            Data Review:     LABS:   Recent Results (from the past 12 hour(s))   GLUCOSE, POC    Collection Time: 09/18/21  9:10 PM   Result Value Ref Range    Glucose (POC) 255 (H) 65 - 100 mg/dL    Performed by [id number], POC    Collection Time: 09/19/21  6:30 AM   Result Value Ref Range    Glucose (POC) 246 (H) 65 - 100 mg/dL    Performed by Federica          Plan:     Principal Problem:    [id number] (9/17/2021)    Active Problems:    ESRD (end stage renal disease) ([facility] (7/24/2021)       ESRD on Dialysis HD typically MWF but off schedule. last Tx was Thurs and Saturday  - next dialysis tomorrow on her typical schedule     Covid admission  - comfortable on 2 Liters NC  - decadron

## 2021-09-21 PROBLEM — R10.9 ABDOMINAL PAIN: Status: ACTIVE | Noted: 2021-01-01

## 2021-09-21 PROBLEM — D72.829 LEUKOCYTOSIS: Status: ACTIVE | Noted: 2021-01-01

## 2021-09-21 PROBLEM — R65.21 SEVERE SEPSIS WITH SEPTIC SHOCK (HCC): Status: ACTIVE | Noted: 2021-01-01

## 2021-09-21 PROBLEM — A41.9 SEVERE SEPSIS WITH SEPTIC SHOCK (HCC): Status: ACTIVE | Noted: 2021-01-01

## 2021-09-21 PROBLEM — D72.10 EOSINOPHILIA: Status: ACTIVE | Noted: 2021-01-01

## 2021-09-21 PROBLEM — K52.9 COLITIS: Status: ACTIVE | Noted: 2021-01-01

## 2021-09-21 PROBLEM — E87.20 LACTIC ACIDOSIS: Status: ACTIVE | Noted: 2021-01-01

## 2021-09-21 PROBLEM — J12.82 PNEUMONIA DUE TO COVID-19 VIRUS: Status: ACTIVE | Noted: 2021-01-01

## 2021-09-21 NOTE — DIALYSIS
Hemodialysis completed. Ran 2.75 hours and 1 liter removed. Uf goal of 3 liters off not achieved d/t hypotension. Needles removed from L arm fistula and pressure held until hemostasis achieved. Oxygenation improved with bipap but took about 30 minutes to get from low 80s to 90s while on bipap. Pt is lethargic, difficult to arouse, required sternal rub. Will open eyes to name but not following commands. Primary team aware. Please wait 2 hours for BMP recheck for electrolytes to stabilize.

## 2021-09-21 NOTE — ED TRIAGE NOTES
Pt is covid +. Reports d/x yesterday with 2l [place]. Reports she is back here for generalized abd pain and vomiting . Denies shob, constipation/diarrhea. Pt arrives to triage with no O2 and O2 was 54% on RA. NRM placed on pt.

## 2021-09-21 NOTE — H&P
Hospitalist History and Physical   Admit Date:  2021 11:02 AM   Name:  Patric Coppola   Age:  57 y.o. Sex:  female  :  1960   MRN:  [id number]   Room:  [facility]/    Presenting Complaint: Positive For Covid-19 and Abdominal Pain    Reason(s) for Admission: Abdominal pain [R10.9]     History of Present Illness:   Patric Coppola is a 57 y.o. female with medical history of pretension, end-stage renal disease and type 2 diabetes who presented with chief complaint of abdominal pain, nausea, vomiting. Patient severely hypoxic with O2 saturations of 54% and was placed on nonrebreather. Chest x-ray still shows evidence of bilateral interstitial opacities. Patient had a pro-Brendan of 137, low-grade temp, white count of 13.8. Patient's abdominal pain rates 10 out of 10 with worsening pain with palpation. Patient has associated anorexia, diarrhea. Patient stated that pain is worse with movement and with eating. Patient has not had much of an appetite since this pain which started today. CT abdomen pelvis pending      Review of Systems:  10 systems reviewed and negative except as noted in HPI.   Assessment & Plan:       Abdominal pain (2021)  Patient rating abdominal pain 10 out of 10  Patient started on prophylactic antibiotics Vanco and Zosyn  Zofran for nausea  Continue pain management  Prophylactic Pepcid 20 mg  Blood cultures pending  Lactic acid elevated 2.7, trend  Hold off on septic bolus since patient on dialysis  Nephrology consulted for dialysis  CT abdomen pelvis pending    Covid/acute hypoxic respiratory failure  Patient just discharged from hospital with diagnosis of Covid on   Patient presented with increased oxygen demand requiring high levels of oxygen and then placed on BiPAP  Patient has a history of asthma  Pulmonology consulted  Started back on steroids  Chest x-ray still shows bilateral interstitial opacities  Prophylactic Vanco and Zosyn started  [employer] C/D, zinc  Mucomyst  Duo nebs every 4 hour  Continue to maintain oxygen requirements greater than 92% with oxygen supplementation  Continue to wean down oxygen requirements    End-stage renal disease  Hemodialysis Monday Wednesday Friday  [facility] nephrology  9/21 patient receiving dialysis    Lactic acidosis/leukocytosis  Blood cultures pending  Empiric antibiotic started    Type 2 diabetes  Sliding scale insulin  Lantus 12 units daily    Hypertension   continue home medications        Dispo/Discharge Planning:   Dispel pending clinical course    Diet: No diet orders on file  VTE ppx: Heparin subcu every 8  Code status: Prior    Hospital Problems as of 9/21/2021 Date Reviewed: 4/9/2021        Codes Class Noted - Resolved POA    Abdominal pain ICD-10-CM: R10.9  ICD-9-CM: 789.00  9/21/2021 - Present Unknown              Past Medical History:   Diagnosis Date    Asthma     COVID-19 9/17/2021    Diabetes ([address]     niddm    Diabetes mellitus type 2, controlled ([address] 4/9/2021    Hypertension      Past Surgical History:   Procedure Laterality Date    IR INSERT TUNL CVC W/O PORT OVER 5 YR  4/12/2021      No Known Allergies   Social History     Tobacco Use    Smoking status: Current Every Day Smoker     Packs/day: 1.00     Years: 32.00     Pack years: 32.00   Substance Use Topics    Alcohol use: No      Family History   Problem Relation Age of Onset    No Known Problems Mother     No Known Problems Father       Family history reviewed and negative except as noted above.   Immunization History   Administered Date(s) Administered    TB Skin Test (PPD) Intradermal 04/12/2021    TD Vaccine 09/16/2008     PTA Medications:  Current Outpatient Medications   Medication Instructions    acetaminophen (TYLENOL) 650 mg, Oral, EVERY 6 HOURS AS NEEDED    carvediloL (COREG) 25 mg, Oral, 2 TIMES DAILY WITH MEALS    dexAMETHasone (DECADRON) 6 mg, Oral, DAILY    famotidine (PEPCID) 20 mg, Oral, DAILY    ferrous sulfate 650 mg, Oral, 2 TIMES DAILY WITH MEALS    gabapentin (NEURONTIN) 100 mg, Oral, 3 TIMES DAILY    lisinopriL (PRINIVIL, ZESTRIL) 20 mg, Oral, 2 TIMES DAILY    NIFEdipine ER (PROCARDIA XL) 90 mg, Oral, DAILY       Objective:     Patient Vitals for the past 24 hrs:   Temp Pulse Resp BP SpO2   09/21/21 1800  (!) 118 29 117/66 100 %   09/21/21 1755  (!) 116 27 113/62 100 %   09/21/21 1752  (!) 121 (!) 31 117/66 99 %   09/21/21 1745  (!) 113 20 (!) 85/54 94 %   09/21/21 1730  (!) 115 20 (!) 91/50 94 %   09/21/21 1715  (!) 112 22 (!) 91/52 94 %   09/21/21 1700  (!) 115 20 (!) 87/54 93 %   09/21/21 1645  (!) 116 21 (!) 89/54 91 %   09/21/21 1630  (!) 114 22 (!) 95/53 91 %   09/21/21 1623  (!) 115 24  92 %   09/21/21 1615  (!) 114 23 (!) 104/57 92 %   09/21/21 1611  (!) 119 (!) 33  94 %   09/21/21 1607  (!) 120 25  90 %   09/21/21 1600  (!) 120 24 (!) 98/55 (!) 88 %   09/21/21 1546  (!) 116 (!) 49 (!) 104/55 (!) 86 %   09/21/21 1543  (!) 118 (!) 37  (!) 86 %   09/21/21 1532  (!) 116 (!) 38 119/66 (!) 79 %   09/21/21 1506  (!) 120  (!) 183/78 (!) 87 %   09/21/21 1501  (!) 117   90 %   09/21/21 1439  (!) 113  (!) 191/90 93 %   09/21/21 1354  (!) 102   94 %   09/21/21 1342  (!) 101  (!) 161/68    09/21/21 1202  (!) 104 28 (!) 152/70    09/21/21 1158  (!) 101   100 %   09/21/21 1142  (!) 106  (!) 177/72 96 %   09/21/21 1121  100 (!) 33 (!) 155/70 90 %   09/21/21 1057     90 %   09/21/21 1056     (!) 66 %   09/21/21 1055 99.6 °F (37.6 °C) (!) 102 24 123/68 (!) 54 %     Oxygen Therapy  O2 Sat (%): 100 % (09/21/21 1800)  Pulse via Oximetry: 120 beats per minute (09/21/21 1800)  O2 Device: Non-rebreather mask (09/21/21 1057)  O2 Flow Rate (L/min): 6 l/min (09/21/21 1056)    Estimated body mass index is 24.8 kg/m² as calculated from the following:    Height as of this encounter: 5' 3\" (1.6 m). Weight as of this encounter: 63.5 kg (140 lb).   No intake or output data in the 24 hours ending 09/21/21 1807      Physical Exam:  General:    Well nourished. Patient looks tired and fatigued  Head:  Normocephalic, atraumatic  Eyes:  Sclerae appear normal.  Pupils equally round. ENT:  Nares appear normal, no drainage. Moist oral mucosa  Neck:  No restricted ROM. Trachea midline   CV:   RRR. No m/r/g. No jugular venous distension. Lungs:   Decreased breath sounds no wheezing, rhonchi, or rales. Respirations even, unlabored  Abdomen: Bowel sounds present. Soft, nontender, nondistended. Extremities: No cyanosis or clubbing. No edema  Skin:     No rashes and normal coloration. Warm and dry. Neuro:  Cranial nerves II-XII grossly intact. Sensation intact  Psych:  Normal mood and affect. Alert and oriented x3    I have reviewed ordered lab tests and independently visualized imaging below:    Last 24hr Labs:  Recent Results (from the past 24 hour(s))   LACTIC ACID    Collection Time: 09/21/21 11:03 AM   Result Value Ref Range    Lactic acid 2.7 (H) 0.4 - 2.0 MMOL/L   CBC WITH AUTOMATED DIFF    Collection Time: 09/21/21 11:03 AM   Result Value Ref Range    WBC 13.8 (H) 4.3 - 11.1 K/uL    RBC 3.52 (L) 4.05 - 5.2 M/uL    HGB 10.4 (L) 11.7 - 15.4 g/dL    HCT 32.5 (L) 35.8 - 46.3 %    MCV 92.3 79.6 - 97.8 FL    MCH 29.5 26.1 - 32.9 PG    MCHC 32.0 31.4 - 35.0 g/dL    RDW 18.8 (H) 11.9 - 14.6 %    PLATELET 599 159 - 000 K/uL    MPV 11.0 9.4 - 12.3 FL    ABSOLUTE NRBC 0.13 0.0 - 0.2 K/uL    NEUTROPHILS 55 43 - 78 %    LYMPHOCYTES 9 (L) 13 - 44 %    MONOCYTES 9 4.0 - 12.0 %    EOSINOPHILS 20 (H) 0.5 - 7.8 %    BASOPHILS 1 0.0 - 2.0 %    IMMATURE GRANULOCYTES 6 (H) 0.0 - 5.0 %    ABS. NEUTROPHILS 7.7 1.7 - 8.2 K/UL    ABS. LYMPHOCYTES 1.2 0.5 - 4.6 K/UL    ABS. MONOCYTES 1.2 0.1 - 1.3 K/UL    ABS. EOSINOPHILS 2.8 (H) 0.0 - 0.8 K/UL    ABS. BASOPHILS 0.1 0.0 - 0.2 K/UL    ABS. IMM.  GRANS. 0.8 (H) 0.0 - 0.5 K/UL    RBC COMMENTS NORMOCYTIC/NORMOCHROMIC      WBC COMMENTS Result Confirmed By Smear      PLATELET COMMENTS ADEQUATE      DF AUTOMATED     METABOLIC PANEL, COMPREHENSIVE    Collection Time: 09/21/21 11:03 AM   Result Value Ref Range    Sodium 129 (L) 136 - 145 mmol/L    Potassium 5.0 3.5 - 5.1 mmol/L    Chloride 91 (L) 98 - 107 mmol/L    CO2 19 (L) 21 - 32 mmol/L    Anion gap 19 (H) 7 - 16 mmol/L    Glucose 318 (H) 65 - 100 mg/dL     (H) 8 - 23 MG/DL    Creatinine 13.30 (H) 0.6 - 1.0 MG/DL    GFR est AA 4 (L) >60 ml/min/1.73m2    GFR est non-AA 3 (L) >60 ml/min/1.73m2    Calcium 8.1 (L) 8.3 - 10.4 MG/DL    Bilirubin, total 0.8 0.2 - 1.1 MG/DL    ALT (SGPT) 15 12 - 65 U/L    AST (SGOT) 32 15 - 37 U/L    Alk. phosphatase 65 50 - 136 U/L    Protein, total 7.9 6.3 - 8.2 g/dL    Albumin 2.7 (L) 3.2 - 4.6 g/dL    Globulin 5.2 (H) 2.3 - 3.5 g/dL    A-G Ratio 0.5 (L) 1.2 - 3.5     PROCALCITONIN    Collection Time: 09/21/21 11:03 AM   Result Value Ref Range    Procalcitonin 137.19 ng/mL   C REACTIVE PROTEIN, QT    Collection Time: 09/21/21 11:03 AM   Result Value Ref Range    C-Reactive protein 16.0 (H) 0.0 - 0.9 mg/dL   LIPASE    Collection Time: 09/21/21 11:03 AM   Result Value Ref Range    Lipase 94 73 - 393 U/L   EKG, 12 LEAD, INITIAL    Collection Time: 09/21/21 11:11 AM   Result Value Ref Range    Ventricular Rate 100 BPM    Atrial Rate 100 BPM    P-R Interval 142 ms    QRS Duration 66 ms    Q-T Interval 346 ms    QTC Calculation (Bezet) 446 ms    Calculated P Axis 81 degrees    Calculated R Axis -9 degrees    Calculated T Axis 86 degrees    Diagnosis       !! AGE AND GENDER SPECIFIC ECG ANALYSIS !!   Normal sinus rhythm  Possible Left atrial enlargement  Borderline ECG  When compared with ECG of 17-SEP-2021 06:47,  No significant change was found  Confirmed by Artem Lizarraga MD (), JOSÉ MIGUEL PACHECO ([id number]) on 9/21/2021 1:54:51 PM     URINE MICROSCOPIC    Collection Time: 09/21/21 11:57 AM   Result Value Ref Range    WBC 20-50 0 /hpf    RBC 0-3 0 /hpf    Epithelial cells 0-3 0 /hpf    Bacteria TRACE 0 /hpf    Casts 0 0 /lpf    Crystals, urine 0 0 /LPF    Mucus 0 0 /lpf    Other observations RESULTS VERIFIED MANUALLY     LACTIC ACID    Collection Time: 09/21/21  1:17 PM   Result Value Ref Range    Lactic acid 2.5 (H) 0.4 - 2.0 MMOL/L       All Micro Results     Procedure Component Value Units Date/Time    BLOOD CULTURE [[id number]] Collected: 09/21/21 1317    Order Status: Completed Specimen: Blood Updated: 09/21/21 1400    BLOOD CULTURE [[id number]] Collected: 09/21/21 1106    Order Status: Completed Specimen: Blood Updated: 09/21/21 1136          Other Studies:  XR CHEST PORT    Result Date: 9/21/2021  Portable chest: History: ER PT, MEETS SIRS CRITERIA-COVID 19 PT Comparison: 09/17/2021 Findings: A single view of the chest was obtained at 11:24 AM. The cardiac silhouette is borderline enlarged. Moderately extensive inhomogeneous bilateral airspace and interstitial opacities have developed. There are no pleural effusions. Interval development of bilateral interstitial and airspace opacities concerning for viral pneumonitis.          Medications Administered     famotidine (PF) (PEPCID) 20 mg in 0.9% sodium chloride 10 mL injection     Admin Date  09/21/2021 Action  Given Dose  20 mg Route  IntraVENous Administered By  Felix Jacobsen RN          heparin (porcine) 1,000 unit/mL injection 2,000 Units     Admin Date  09/21/2021 Action  Given Dose  2,000 Units Route  Hemodialysis Administered By  Yen Sane D          morphine injection 2 mg     Admin Date  09/21/2021 Action  Given Dose  2 mg Route  IntraVENous Administered By  Felix Jacobsen RN          ondansetron [facility]) injection 4 mg     Admin Date  09/21/2021 Action  Given Dose  4 mg Route  IntraVENous Administered By  Felix Jacobsen RN          piperacillin-tazobactam (ZOSYN) 4.5 g in 0.9% sodium chloride (MBP/ADV) 100 mL MBP     Admin Date  09/21/2021 Action  New Bag Dose  4.5 g Rate  200 mL/hr Route  IntraVENous Administered By  Asa William A, RN          sodium chloride 0.9 % bolus infusion 500 mL     Admin Date  09/21/2021 Action  New Bag Dose  500 mL Rate  500 mL/hr Route  IntraVENous Administered By  Adan Rocha RN          vancomycin (VANCOCIN) 1750 mg in  ml infusion     Admin Date  09/21/2021 Action  New Bag Dose  1,750 mg Rate  250 mL/hr Route  IntraVENous Administered By  Adan Rocha RN                  Signed:  Abbi Bailey MD    Part of this note may have been written by using a voice dictation software. The note has been proof read but may still contain some grammatical/other typographical errors.

## 2021-09-21 NOTE — PROGRESS NOTES
Per ER patient is in dialysis until 6:30, will call around 5:30 to give oral contrast and scan CT a/p after dialysis is completed.

## 2021-09-21 NOTE — DIALYSIS
Emergent HD started bedside ED07 using Pt's LUE AVF and 15 gauge needles. Consent obtained. Pt on NRB O2 saturation 90%. Saturations dropping to mid 80s during HD    Pt alert, moaning, responds to commands. /78, . RO # 2 in use. Will remain bedside to monitor.

## 2021-09-21 NOTE — CONSULTS
Nephrology consult    Admission Date:  9/21/2021    Admission Diagnosis  Abdominal pain [R10.9]    We are asked by Dr. Ashia Lynne    History of Present Illness: Ms. Archie Luther is a 57 y.o female with PMH significant for DM type II, HTN, ESRD and recent [place] admission presents to ER with complaints abdominal pain, vomiting, exertional SOB found hypoxic O2 sats 54% placed on non re breather O2. cxr with evidence of bilateral insterstitial opacities. WBC 13.8, procal 137.19, Temp 99.6, started on vancomycin and zosyn. CT abd pending per primary. From a renal standpoint she is hemodialysis dependent via [facility]  [address] and was hospitalized with last HD treatment Saturday 9/18 with a outpt covid slot at [facility]. Patient seen and examined in ER complaints of abd tenderness, shortness of breath. Past Medical History:   Diagnosis Date    Asthma     COVID-19 9/17/2021    Diabetes ([facility] [address]     niddm    Diabetes mellitus type 2, controlled ([facility] [address] 4/9/2021    Hypertension       Past Surgical History:   Procedure Laterality Date    IR INSERT TUNL CVC W/O PORT OVER 5 YR  4/12/2021      Current Facility-Administered Medications   Medication Dose Route Frequency    sodium chloride (NS) flush 5-10 mL  5-10 mL IntraVENous Q8H    sodium chloride (NS) flush 5-10 mL  5-10 mL IntraVENous PRN    vancomycin (VANCOCIN) 1750 mg in  ml infusion  1,750 mg IntraVENous ONCE    sodium chloride 0.9 % bolus infusion 500 mL  500 mL IntraVENous ONCE    diatrizoate casey-diatrizoat sod (MD-GASTROVIEW,GASTROGRAFIN) 66-10 % contrast solution 15 mL  15 mL Oral ONCE     Current Outpatient Medications   Medication Sig    dexAMETHasone (DECADRON) 6 mg tablet Take 1 Tablet by mouth daily.  gabapentin (NEURONTIN) 100 mg capsule Take 1 Capsule by mouth three (3) times daily.  famotidine (Pepcid) 20 mg tablet Take 1 Tab by mouth daily.  NIFEdipine ER (PROCARDIA XL) 90 mg ER tablet Take 1 Tab by mouth daily.     acetaminophen (TYLENOL) 325 mg tablet Take 2 Tabs by mouth every six (6) hours as needed for Pain or Fever.  ferrous sulfate 325 mg (65 mg iron) tablet Take 2 Tabs by mouth two (2) times daily (with meals).  lisinopriL (PRINIVIL, ZESTRIL) 20 mg tablet Take 1 Tab by mouth two (2) times a day.  carvediloL (COREG) 25 mg tablet Take 25 mg by mouth two (2) times daily (with meals). No Known Allergies   Social History     Tobacco Use    Smoking status: Current Every Day Smoker     Packs/day: 1.00     Years: 32.00     Pack years: 32.00   Substance Use Topics    Alcohol use: No      Family History   Problem Relation Age of Onset    No Known Problems Mother     No Known Problems Father         Review of Systems  Gen - fever, no chills  HEENT - no sore throat, no decreased vision, no hearing loss  CV - no chest pain, no palpitation, no orthopnea  Lung - + shortness of breath, + cough, no hemoptysis  Abd - + tenderness, + nausea/vomiting, no bloody stool  Ext - no edema, no clubbing, no cyanosis  Musculoskeletal - no joint pain, no back pain  Neurologic - no headaches, no dizziness, no seizures  Psychiatric - no anxiety, no depression  Skin - no rashes, no pupura  Genitourinary - no dysuria     Objective:     Vitals:    09/21/21 1158 09/21/21 1202 09/21/21 1342 09/21/21 1354   BP:  (!) 152/70 (!) 161/68    Pulse: (!) 101 (!) 104 (!) 101 (!) 102   Resp:  28     Temp:       SpO2: 100%   94%   Weight:       Height:         No intake or output data in the 24 hours ending 09/21/21 1410    Physical Exam  GEN :in no distress, alert and oriented  HEENT: anicteric sclerae, Mucous membranes are moist.  Neck - supple without JVD  CV - mild tachycardia, S1, S2, no Rub   Lung - diminished bilaterally, tachypnea and on non rebeather  Abd - soft, + tender, bowel sounds present  Ext - no clubbing, no cyanosis, no edema  Neurologic - nonfocal  Skin - no rashes, no purpura  Psychiatric: Normal mood and affect.   Access: VINI GIBSON + bruit, + thrill       Data Review:   Recent Labs     09/21/21  1103   WBC 13.8*   HGB 10.4*   HCT 32.5*        Recent Labs     09/21/21  1103   *   K 5.0   CL 91*   CO2 19*   *   CREA 13.30*   *   CA 8.1*     No results for input(s): PH, PCO2, PO2, PCO2 in the last 72 hours. Problem List:     Patient Active Problem List    Diagnosis Date Noted    Abdominal pain 09/21/2021    COVID-19 09/17/2021    Hyperkalemia 07/24/2021    ESRD (end stage renal disease) [address] 07/24/2021    Acute hypoxemic respiratory failure ([facility] 04/09/2021    CKD (chronic kidney disease) 04/09/2021    Hypertension 04/09/2021    Diabetes mellitus type 2, controlled ([facility] 04/09/2021       Impression:    Plan:   1. ESRD normally at [facility] with a covid slot at [address] [facility]. Last dialyzed here   -HD today with 2 K dialysate,  Hyponatremia-  UF as tolerated     Note amendment 15:35, seen on HD, dialyzing via LUE  Qb, UF 3000, with hypoxemia RT at bedside placing pt on Bipap. 2. COVID 19 +/ acute respiratory failure with hypoxemia  -cxr bilateral Insterstitial opacities. On non rebreather    3. HTN- UF with HD, added home coreg    4. DM type II- SSI per hosp     5. Abd pain- abd CT per primary pending     6.  Leukocytosis/ lactic acidosis- blood cultures pending on empirical ABX

## 2021-09-21 NOTE — ED PROVIDER NOTES
The history is provided by the patient. The history is limited by the condition of the patient. Abdominal Pain   This is a new problem. The current episode started yesterday. The problem occurs constantly. The problem has been gradually worsening. The pain is associated with an unknown factor. The pain is located in the generalized abdominal region and epigastric region. The quality of the pain is aching and sharp. The pain is at a severity of 10/10. Associated symptoms include anorexia, diarrhea, nausea and vomiting. Pertinent negatives include no fever, no belching, no flatus, no hematochezia, no melena, no constipation, no dysuria, no frequency, no hematuria, no headaches, no arthralgias, no myalgias, no trauma, no chest pain and no back pain. The pain is worsened by activity, palpation and eating. The pain is relieved by nothing. Her past medical history is significant for DM. Her past medical history does not include PUD, gallstones, GERD, ulcerative colitis, Crohn's disease, irritable bowel syndrome, cancer, UTI, pancreatitis, ectopic pregnancy, ovarian cysts, diverticulitis, atrial fibrillation, kidney stones or small bowel obstruction. The patient's surgical history non-contributory.        Past Medical History:   Diagnosis Date    Asthma     COVID-19 9/17/2021    Diabetes ([facility] [address]     niddm    Diabetes mellitus type 2, controlled ([facility] [address] 4/9/2021    Hypertension        Past Surgical History:   Procedure Laterality Date    IR INSERT TUNL CVC W/O PORT OVER 5 YR  4/12/2021         Family History:   Problem Relation Age of Onset    No Known Problems Mother     No Known Problems Father        Social History     Socioeconomic History    Marital status: SINGLE     Spouse name: Not on file    Number of children: Not on file    Years of education: Not on file    Highest education level: Not on file   Occupational History    Not on file   Tobacco Use    Smoking status: Current Every Day Smoker Packs/day: 1.00     Years: 32.00     Pack years: 32.00   Substance and Sexual Activity    Alcohol use: No    Drug use: No    Sexual activity: Not on file   Other Topics Concern    Not on file   Social History Narrative    Not on file     Social Determinants of Health     Financial Resource Strain:     Difficulty of Paying Living Expenses:    Food Insecurity:     Worried About Running Out of Food in the Last Year:     [address] in the Last Year:    Transportation Needs:     Lack of Transportation (Medical):  Lack of Transportation (Non-Medical):    Physical Activity:     Days of Exercise per Week:     Minutes of Exercise per Session:    Stress:     Feeling of Stress :    Social Connections:     Frequency of Communication with Friends and Family:     Frequency of Social Gatherings with Friends and Family:     Attends Rastafari Services:     Active Member of Clubs or Organizations:     Attends Club or Organization Meetings:     Marital Status:    Intimate Partner Violence:     Fear of Current or Ex-Partner:     Emotionally Abused:     Physically Abused:     Sexually Abused: ALLERGIES: Patient has no known allergies. Review of Systems   Constitutional: Positive for appetite change and fatigue. Negative for chills and fever. Respiratory: Positive for cough and shortness of breath (Patient discharged 2 days ago on oxygen for her COVID-19). Negative for wheezing. Cardiovascular: Negative for chest pain, palpitations and leg swelling. Gastrointestinal: Positive for abdominal pain, anorexia, diarrhea, nausea and vomiting. Negative for constipation, flatus, hematochezia and melena. Genitourinary: Negative for dysuria, frequency and hematuria. Musculoskeletal: Negative for arthralgias, back pain and myalgias. Neurological: Negative for headaches. Psychiatric/Behavioral: Positive for decreased concentration. Negative for suicidal ideas.    All other systems reviewed and are negative. Vitals:    09/21/21 1055 09/21/21 1056 09/21/21 1057   BP: 123/68     Pulse: (!) 102     Resp: 24     Temp: 99.6 °F (37.6 °C)     SpO2: (!) 54% (!) 66% 90%   Weight: 63.5 kg (140 lb)     Height: 5' 3\" (1.6 m)              Physical Exam  Vitals and nursing note reviewed. Constitutional:       General: She is in acute distress. Appearance: She is well-developed. HENT:      Head: Normocephalic and atraumatic. Right Ear: External ear normal.      Left Ear: External ear normal.   Eyes:      Extraocular Movements: Extraocular movements intact. Conjunctiva/sclera: Conjunctivae normal.      Pupils: Pupils are equal, round, and reactive to light. Cardiovascular:      Rate and Rhythm: Normal rate and regular rhythm. Heart sounds: Normal heart sounds. No murmur heard. Pulmonary:      Effort: Tachypnea and accessory muscle usage present. Breath sounds: Rales present. No decreased breath sounds, wheezing or rhonchi. Abdominal:      General: Abdomen is flat. Bowel sounds are normal.      Palpations: Abdomen is soft. There is no shifting dullness, hepatomegaly, mass or pulsatile mass. Tenderness: There is abdominal tenderness in the epigastric area. There is no right CVA tenderness, left CVA tenderness, guarding or rebound. Negative signs include Treviño's sign and McBurney's sign. Hernia: No hernia is present. Musculoskeletal:         General: Normal range of motion. Cervical back: Normal range of motion and neck supple. Skin:     General: Skin is warm and dry. Capillary Refill: Capillary refill takes less than 2 seconds. Neurological:      Mental Status: She is alert. Cranial Nerves: Cranial nerves are intact. Sensory: Sensation is intact. Motor: Motor function is intact. Psychiatric:         Speech: Speech normal.         Behavior: Behavior is cooperative. Thought Content:  Thought content normal.         Cognition and Memory: Cognition normal. She exhibits impaired recent memory (Patient knows she is in the emergency department at [facility], thinks the year is 2000.). MDM  Number of Diagnoses or Management Options  Acute abdominal pain: new and requires workup  ESRD needing dialysis [facility]): new and requires workup  Hypoxia: new and requires workup  Pneumonia due to COVID-19 virus: new and requires workup  Severe sepsis [facility]): new and requires workup     Amount and/or Complexity of Data Reviewed  Clinical lab tests: ordered and reviewed  Tests in the radiology section of CPT®: ordered and reviewed  Tests in the medicine section of CPT®: ordered and reviewed  Review and summarize past medical records: yes (Excerpt from patient's discharge summary 2 days ago:     Physician Discharge Summary     Patient ID:  Guanako Maurer  [id number]  24 y.o.  1960     Admit date: 9/17/2021     Discharge date: 9-     Diagnosis:  1- Covid 19 pneumonia. Hx of asthma. Improved. 2- Ac hypoxic resp failure, on 2L, due to #1.   3- ESRD (end stage renal disease) on hemodialysis MWF  4- DM and HTN, controlled  5- Ac metabolic encephalopathy, resolved.     Treated with:     Decadron  Vitamins C/D, Zinc  Mucomyst po  Oxygen as needed  Dialysis, per nephrology  Blood pressure control  Insulin scale     Hospital course:   57 y.o. female w/ hx of ESRD, asthma, DM and HTN, who presents to the ED with a chief complaint of confusion.  Family states that she just moved and told the dialysis company the wrong address they took her to the wrong house and she was sitting outside all night they could not locate her. Chidi Glover currently states she just feels bad all over. Chidi Glover knows she is at [address] and who she is. Chidi Glover has no focal weakness.  Just generalized weakness. Fermín Underwood is very concerned that she has not ate well for the last 4 days.  Daughter reports that she thought she felt warm no fever at our triage note.  No vomiting or diarrhea. On further questioning, pt has sob and cough, no diarrhea, sick for 5-7 days, on 2L O2 during my exam.  Patient admitted and treated as above. On day of discharge, patient exam showed occasional mild crackles, pt felt well, on 2L O2.  )  Discuss the patient with other providers: yes    Risk of Complications, Morbidity, and/or Mortality  Presenting problems: high  Diagnostic procedures: moderate  Management options: high    Patient Progress  Patient progress: stable         Procedures    The patient was observed in the ED. the patient is a dialysis patient, and based on her BUN and creatinine, has probably not undergone dialysis since her discharge. She will be treated for severe sepsis of unclear etiology with vancomycin and Zosyn, but fluid bolus will not be 30 mL/kg due to her underlying renal failure with hypoxia due to Covid. Case will be discussed with the hospitalist.    Results Reviewed:      Recent Results (from the past 24 hour(s))   LACTIC ACID    Collection Time: 09/21/21 11:03 AM   Result Value Ref Range    Lactic acid 2.7 (H) 0.4 - 2.0 MMOL/L   CBC WITH AUTOMATED DIFF    Collection Time: 09/21/21 11:03 AM   Result Value Ref Range    WBC 13.8 (H) 4.3 - 11.1 K/uL    RBC 3.52 (L) 4.05 - 5.2 M/uL    HGB 10.4 (L) 11.7 - 15.4 g/dL    HCT 32.5 (L) 35.8 - 46.3 %    MCV 92.3 79.6 - 97.8 FL    MCH 29.5 26.1 - 32.9 PG    MCHC 32.0 31.4 - 35.0 g/dL    RDW 18.8 (H) 11.9 - 14.6 %    PLATELET 858 154 - 873 K/uL    MPV 11.0 9.4 - 12.3 FL    ABSOLUTE NRBC 0.13 0.0 - 0.2 K/uL    NEUTROPHILS 55 43 - 78 %    LYMPHOCYTES 9 (L) 13 - 44 %    MONOCYTES 9 4.0 - 12.0 %    EOSINOPHILS 20 (H) 0.5 - 7.8 %    BASOPHILS 1 0.0 - 2.0 %    IMMATURE GRANULOCYTES 6 (H) 0.0 - 5.0 %    ABS. NEUTROPHILS 7.7 1.7 - 8.2 K/UL    ABS. LYMPHOCYTES 1.2 0.5 - 4.6 K/UL    ABS. MONOCYTES 1.2 0.1 - 1.3 K/UL    ABS. EOSINOPHILS 2.8 (H) 0.0 - 0.8 K/UL    ABS. BASOPHILS 0.1 0.0 - 0.2 K/UL    ABS. IMM.  GRANS. 0.8 (H) 0.0 - 0.5 K/UL    RBC COMMENTS NORMOCYTIC/NORMOCHROMIC      WBC COMMENTS Result Confirmed By Smear      PLATELET COMMENTS ADEQUATE      DF AUTOMATED     METABOLIC PANEL, COMPREHENSIVE    Collection Time: 09/21/21 11:03 AM   Result Value Ref Range    Sodium 129 (L) 136 - 145 mmol/L    Potassium 5.0 3.5 - 5.1 mmol/L    Chloride 91 (L) 98 - 107 mmol/L    CO2 19 (L) 21 - 32 mmol/L    Anion gap 19 (H) 7 - 16 mmol/L    Glucose 318 (H) 65 - 100 mg/dL     (H) 8 - 23 MG/DL    Creatinine 13.30 (H) 0.6 - 1.0 MG/DL    GFR est AA 4 (L) >60 ml/min/1.73m2    GFR est non-AA 3 (L) >60 ml/min/1.73m2    Calcium 8.1 (L) 8.3 - 10.4 MG/DL    Bilirubin, total 0.8 0.2 - 1.1 MG/DL    ALT (SGPT) 15 12 - 65 U/L    AST (SGOT) 32 15 - 37 U/L    Alk. phosphatase 65 50 - 136 U/L    Protein, total 7.9 6.3 - 8.2 g/dL    Albumin 2.7 (L) 3.2 - 4.6 g/dL    Globulin 5.2 (H) 2.3 - 3.5 g/dL    A-G Ratio 0.5 (L) 1.2 - 3.5     PROCALCITONIN    Collection Time: 09/21/21 11:03 AM   Result Value Ref Range    Procalcitonin 137.19 ng/mL   C REACTIVE PROTEIN, QT    Collection Time: 09/21/21 11:03 AM   Result Value Ref Range    C-Reactive protein 16.0 (H) 0.0 - 0.9 mg/dL       XR CHEST PORT   Final Result   Interval development of bilateral interstitial and airspace   opacities concerning for viral pneumonitis. CRITICAL CARE Documentation: This patient is critically ill and there is a high probability of of imminent or life threatening deterioration in the patient's condition without immediate management. The nature of the patient's clinical problem is: Severe sepsis, COVID-19 pneumonia with increased hypoxia, acute abdominal pain, ESRD needing urgent dialysis    I have spent 1 hour in direct patient care, documentation, review of labs/xrays/old records, discussion with Staff, Colleague, Nursing . The time involved in the performance of separately reportable procedures was not counted toward critical care time.      Marco Pham MD; 9/21/2021 @12:51 PM

## 2021-09-22 NOTE — PROGRESS NOTES
Pharmacokinetic Consult to Pharmacist    Sri Fleming is a 57 y.o. female being treated with vancomycin. Height: 5' 3\" (160 cm)  Weight: 63.5 kg (140 lb)  Lab Results   Component Value Date/Time    BUN 51 (H) 09/21/2021 06:42 PM    Creatinine 7.41 (H) 09/21/2021 06:42 PM    WBC 13.8 (H) 09/21/2021 11:03 AM    Procalcitonin 137.19 09/21/2021 11:03 AM    Lactic acid 3.7 (HH) 09/21/2021 06:42 PM      Estimated Creatinine Clearance: 7.1 mL/min (A) (based on SCr of 7.41 mg/dL (H)). Day 1 of vancomycin. Goal trough is 15-20. Vancomycin dose initiated at 1750 mg x1 dose; followed by intermittent dosing. Will continue to follow patient and order levels when clinically indicated. Thank you,  Mary Kay Whiting, PharmD.

## 2021-09-22 NOTE — PROCEDURES
ARTERIAL LINE (A-Line) PLACEMENT  Date: 9-21-21  Time: 2130  Indication: Hemodynamic monitoring      A time-out was completed verifying correct patient, procedure, site, positioning, and special equipment if applicable. Louiss test was performed to ensure adequate perfusion. The patients right wrist was prepped and draped in sterile fashion. 1% Lidocaine was used to anesthetize the area. A 20G Arrow arterial line was introduced into the right radial artery under ultra sound guidance. The catheter was threaded over the guide wire and the needle was removed with appropriate pulsatile blood return. The catheter was then sutured in place to the skin and a sterile dressing applied. Perfusion to the extremity distal to the point of catheter insertion was checked and found to be adequate.        The patient tolerated the procedure well and there were no complications

## 2021-09-22 NOTE — PROGRESS NOTES
Patient is becoming hypotensive, w/ more o2 need, now on bipap FIo2 100%, less responsive but awake which is a change. She had hemodialysis earlier today. CT abd/pelv shows RLL consolidation. She was hospitalized recently. Patient seen and examined by me. Lab ordered and reviewed. Dx:  1- Septic shock w/ possible HCAP involving RLL, with underlying covid-19 infection  2- Ac hypoxic resp failure dt #1, on bipap, Fio2 100%  3- ESRD on HD    Rx:  ICU  Add Zosyn+ vancomycin  Levophed gtt  Decadron iv  Other Rx for covid-19  Full code    I discussed case w daughter and RN.   Time 40 min    Signed By: Raúl Escobedo MD     September 21, 2021

## 2021-09-22 NOTE — PROGRESS NOTES
Patient was intubated with a number 7.5 ET Tube. Tube placement verified by auscultation, by CXR and ETCO2 monitor. ET Tube is secured at the 23 cm placido at the lip and on the right side. Patient was intubated by John Reilly NP on the 1 attempt. Breath sounds are diminished. Patient is Negative for subcutaneous air and chest excursion is symmetric. Trachea is midline. Patient is also Negative for cyanosis and is Negative for pitting edema. Patient placed on ventilator on documented settings. All alarms are set and audible. Resuscitation bag is at the head of the bed.       Ventilator Settings  Mode FIO2 Rate Tidal Volume Pressure PEEP I:E Ratio   VC+  100 %   32 bpm 340 mL     12 cm H20         Peak airway pressure: 37 cm H2O   Minute ventilation: 10.6 l/min     Naima Henry, RT

## 2021-09-22 NOTE — PROGRESS NOTES
Ventilator check complete; patient has a #7.5 ET tube secured at the 24 at the lip. Patient is sedated. Patient is not able to follow commands. Breath sounds are diminished. Trachea is midline, Negative for subcutaneous air, and chest excursion is symmetric. Patient is also Negative for cyanosis and is Negative for pitting edema. All alarms are set and audible. Resuscitation bag is at the head of the bed. Ventilator Settings  Mode FIO2 Rate Tidal Volume Pressure PEEP I:E Ratio   VC+  100 %   32 340 ml     12 cm H20 (increased to 14)         Peak airway pressure: 39 cm H2O   Minute ventilation: 10.7 l/min     ABG: No results for input(s): PH, PCO2, PO2, HCO3 in the last 72 hours.       Angelica Shaw, RT

## 2021-09-22 NOTE — PROGRESS NOTES
[facility] Critical Care Note[de-identified] 9/22/2021  Gabriel Newton  Admission Date: 9/21/2021     Length of Stay: 1 days    Background: 57 y.o. y/o female with ESRD on MWF HD, DM2, HTN. Recent admission for covid and encephalopathy. Discharged 9/19 on 3L O2 and oral steroids. Returned 9/21 with abd pain, worsening hypoxia and infiltrates, CT abd with colitis, and severe sepsis with septic shock. Notable PMH:  has a past medical history of Asthma, COVID-19 (9/17/2021), Diabetes ([address] Diabetes mellitus type 2, controlled ([address] (4/9/2021), and Hypertension. She also has no past medical history of Arthritis, Autoimmune disease ([address] CAD (coronary artery disease), Cancer ([address] Chronic kidney disease, COPD, Dementia, Heart failure ([address] Liver disease, Neurological disorder, Other ill-defined conditions(799.89), Psychiatric disorder, PUD (peptic ulcer disease), Seizures ([address] Sleep disorder, Stroke ([address] or Thromboembolus ([address] 24 Hour events: Patient was admitted yesterday to ICU on bipap. Oxygenation worsened throughouot the nigth and she was intubated earlier this morning. Was already on pressors prior to intubation and subsequently had vaso, joanne, and then epi drips added. BP now acceptable and weaning epi. ABG showing severe acidosis mostly due to respiratory acidosis with a mild metabolic acidosis. Vent support as high as possible with RR 32 and Ppl in the mid to upper 30's. Given bicarb pushes and bicarb drip started. Family was called in to see her. Flolan started, then proned. Not currently on paralytics with no breathing over vent, held due to severe hypotension. May be able to add later if needed and if BP improves. ROS: unable to obtain/negative except as listed elsewhere.      Lines: (insertion date)   ETT: (9/22)  Conley:   OGT: (9/22)  Central line: (9/21)  Arterial Line (9/21)    Drips: current dose (range)  Epinephrine Dose (mcg/min): 5 mcg/min  PitRESSIN Dose (Units/kg/min): 0.0016 Units/kg/min  Phenylephrine Dose (mcg/min): 300 mcg/min  Versed Dose (mg/hr): 5 mg/hr  Epoprostenol Dose (ng/kg/min): 30 ng/kg/min  Precedex Dose (mcg/kg/hr): 0 mcg/kg/hr  Levophed Dose (mcg/kg/min): 0.4724 mcg/kg/min     Pertinent Exam:         Blood pressure (!) 107/47, pulse 81, temperature 98.1 °F (36.7 °C), resp. rate 20, height 5' 3\" (1.6 m), weight 140 lb (63.5 kg), SpO2 (!) 74 %. Intake/Output Summary (Last 24 hours) at 9/22/2021 0721  Last data filed at 9/22/2021 0539  Gross per 24 hour   Intake 2813.07 ml   Output 1000 ml   Net 1813.07 ml     Constitutional:  intubated and mechanically ventilated. EENMT:  Sclera clear, pupils equal, oral mucosa moist  Respiratory: Prone. Posterior lung fields with mild rhonchi. ETT in position. Cardiovascular:  Proned  Gastrointestinal:  Proned  Musculoskeletal:  warm with no cyanosis, no lower extremity edema  Skin:  no jaundice or ecchymosis  Neurologic: sedated  Psychiatric: sedated    CXR:   9/22      Recent Labs     09/22/21 0407 09/21/21 1842 09/21/21  1317 09/21/21  1103 09/21/21  1103   WBC 2.2*  --   --   --  13.8*   HGB 9.0*  --   --   --  10.4*   HCT 28.7*  --   --   --  32.5*   *  --   --   --  213   PCT  --   --   --   --  137.19   LAC 4.3* 3.7* 2.5*   < > 2.7*    < > = values in this interval not displayed. Recent Labs     09/22/21  0407 09/21/21  1842 09/21/21  1103    140 129*   K 4.4 4.5 5.0    100 91*   CO2 20* 23 19*   * 187* 318*   BUN 55* 51* 101*   CREA 7.97* 7.41* 13.30*   MG 2.0  --   --    CA 7.0* 8.2* 8.1*   ALB 1.7*  --  2.7*   AST 86*  --  32   ALT 18  --  15   AP 43*  --  65     Recent Labs     09/22/21  0407 09/21/21  1842 09/21/21  1317 09/21/21  1103 09/21/21  1103   LAC 4.3* 3.7* 2.5*   < > 2.7*   CRP  --   --   --   --  16.0*    < > = values in this interval not displayed.      Recent Labs     09/21/21  2149 09/21/21  1802 09/19/21  1137   GLUCPOC 180* 162* 115*     ECHO: No results found for this or any previous visit. Results     Procedure Component Value Units Date/Time    CULTURE, STOOL [[id number]]     Order Status: Sent Specimen: Stool     OVA & PARASITES, STOOL [[id number]]     Order Status: Sent Specimen: Feces from Stool     BLOOD CULTURE [[id number]] Collected: 09/21/21 1317    Order Status: Completed Specimen: Blood Updated: 09/21/21 1400    BLOOD CULTURE [[id number]] Collected: 09/21/21 1106    Order Status: Completed Specimen: Blood Updated: 09/21/21 1136    COVID-19 RAPID TEST [[id number]]  (Abnormal) Collected: 09/17/21 0759    Order Status: Completed Specimen: Nasopharyngeal Updated: 09/17/21 0827     Specimen source NASAL        COVID-19 rapid test Detected        Comment:      The specimen is POSITIVE for SARS-CoV-2, the novel coronavirus associated with COVID-19. This test has been authorized by the FDA under an Emergency Use Authorization (EUA) for use by authorized laboratories.         Fact sheet for Healthcare Providers: ConventionUpdate.co.nz  Fact sheet for Patients: ConventionUpdate.co.nz       Methodology: Isothermal Nucleic Acid Amplification             Inpat Anti-Infectives (From admission, onward)     Start     Ordered Stop    09/21/21 2243  Vancomycin Intermittent Dosing  Other,   RX DOSING/MONITORING      09/21/21 2243 --    09/21/21 2200  piperacillin-tazobactam (ZOSYN) 3.375 g in 0.9% sodium chloride (MBP/ADV) 100 mL MBP  3.375 g,   IntraVENous,   EVERY 12 HOURS      09/21/21 2113 09/28/21 2159              Ventilator Settings:  Ideal body weight: 52.4 kg (115 lb 8.3 oz)   Mode FIO2 Rate Tidal Volume Pressure PEEP   VC+  100 %    450 ml     12 cm H20    Peak airway pressure: 37 cm H2O       Minute ventilation: 10.6 l/min  ABG:  Recent Labs     09/22/21  0525 09/22/21  0307 09/21/21  1816   PHI 7.14* 7.26* 7.38   PCO2I 61.3* 44.3 43.9   PO2I 51* 54* 80   HCO3I 21.1* 20.0* 25.9     Assessment and Plan:  (Medical Decision Making) Impression: 57 y.o. female with h/o ESRD, recent admission for confusion, found to be covid positive but discharged 2 days later with mild symptoms and hypoxia on oral steroids. Now back with much worse acute hypoxia compounded by what appears to be acute colitis and eosinophilia. With recent vomiting some of her dense RLL infiltrates may be related to aspiration pneuomonia. It also could be related to eosinophilic pneumonia. Now intubated and maxed on pressors. NEURO:   Sedation: cont versed  Analgesia: cont fentanyl  Paralytics: have tried to hold off with severe hypotension. So far does not appear to be needed as she is relatively flaccid already, but may add if BP improves and she begins to fight the vent at all. CV:   Volume Status: appears euvolemic. CVP had been 7 leading to several IVF boluses overnight. ESRD so holding on further fluids for now. Septic shock: severe, maxed on levo, vaso, joanne, and epi just added. BP now acceptable and RN weaning joanne. Presume from sepsis due to colitis. Lactic acidosis with lactate rising on last check at 4 this morning. PULM:   Acute hypoxemic/hypercapneic respiratory failure:  Severe. On 100% fio2, proned. Flolan added and uptitrating, going to 30 now with instructions to rn to cont to uptitrate to 50 as needed  Severe ARDS 2/2 COVID: due to covid. May also have superimposed aspiration PNA vs eosinophilic process. Getting abx and steroids. RENAL:  ESRD: on MWF HD. Was dialyzed in ER yesterday. Renal following. Lactic acidosis: up to 4 on last check. Supporting her perfusion as much as possible. Will get TTE today to eval for any cardiac component but suspect all due to sepsis  Electrolytes: HD as above. GI:   Nutrition: holding tube feeds for now with severe pressor needs. HEME:   Anemia: mild drop. Cont to monitor  Thrombocytopenia: Plts dropped significantly. Suspect consumption due to sepsis. Anticoagulation: currently getting heparin.  May need to hold if plts drop much further. ID:   [id number]: on dexa and received toci dose. ENDO:   DM: SSI  Skin: no decub, turns, preventive care  Prophy: pepcid and heparin    Family called in to see her. Spoke to them in person. They are aware of here serious prognosis and possibility that she may marti survive the day. They were grieving this news, did not discuss code status with them. Full Code    Critically ill patient with complicated medical issues and high risk of further decompensation.  Total critical care time spent thus far (exclusive of procedures): 53 minutes so far      Luiz Zaragoza MD

## 2021-09-22 NOTE — PROGRESS NOTES
TRANSFER - IN REPORT:    Verbal report received from MARICRUZ Bellamy on Conchis Starr  being received from ED(unit) for routine progression of care      Report consisted of patients Situation, Background, Assessment and   Recommendations(SBAR). Information from the following report(s) SBAR, Kardex, ED Summary, Intake/Output, MAR, Recent Results, Med Rec Status and Cardiac Rhythm STach was reviewed with the receiving nurse. Opportunity for questions and clarification was provided. Assessment completed upon patients arrival to unit and care assumed. Dual skin assessment completed with Pipe Underwood RN findings were Skin color, texture, turgor normal. No rashes or lesions. L forearm AV fistula site with dressing in place. + thrill/bruit. Elyn Albino in place with no abnormalities noted.

## 2021-09-22 NOTE — PROCEDURES
Endotracheal Intubation  Date: 9-22-21  Time: 0315    Indication: Respiratory Distress      Attending: Dr. Daisy Phelps    A time-out was completed verifying correct patient, procedure, site, positioning, and special equipment if applicable. The patient was placed in a flat position. Sedation was obtained using fentanyl 50mcg and additionally with Etomidate 20mg and anectine 100mg. The patient was easily ventilated using an ambu bag. The [employer]/ MAC 3 BLADE was used and inserted into the oropharynx at which time there was a Grade 1 view of the vocal cords. A 7.5-Telugu endotracheal tube was inserted and visualized going through the vocal cords. The stylette was removed. Colorimetric change was visualized on the CO2 meter. Breath sounds were heard in both lung fields equally. The endotracheal tube was placed at 23 cm, measured at the teeth. A chest x-ray was ordered to assess for pneumothorax and verify endotrachealtube placement. The patient tolerated the procedure well and there were no complications.

## 2021-09-22 NOTE — CONSULTS
Palliative Care    Patient: Jhonny Putnam MRN: [id number]  SSN: [id number]    YOB: 1960  Age: 57 y.o. Sex: female       Date of Request: 9/22/2021  Date of Consult:  9/22/2021  Reason for Consult:  family support and education and goals of care  Requesting Physician: Dr. Dario Frankel     Assessment/Plan:     Principal Diagnosis:    Dyspnea  R06.00    Additional Diagnoses:   · Acute Respiratory Failure, Unspecified  J96.00  · Debility, Unspecified  R53.81  · Frailty  R54  · Sepsis, Unspecified Organism:  A41.9  · Counseling, Encounter for Medical Advice  Z71.9  · Encounter for Palliative Care  Z51.5    Palliative Performance Scale (PPS):       Medical Decision Making:   Reviewed and summarized labs and imaging. Pt with acute decline overnight and now on 4 vasopressors, intubated on 100% O2. Spoke with multilple family members at bedside including daughter who is decision maker. Explained severity of condition and that pt was actively dying despite ongoing treatment. Answered questions regarding care. Discussed comfort measures and code status. Family agrees to DNR and comfort care. Stopped vasopressors and changed vent to pressure support. Stop unnecessary labs and meds  Comfort measures onlyl  DNR ordered  Total time spent 8950-1935    I spent greater than 25 mins on advanced care planning. Will discuss findings with members of the interdisciplinary team.      Thank you for this referral.         Subjective:     History obtained from:  Family and Chart    Chief Complaint: altered mental status  History of Present Illness:  57 y.o. y/o female with ESRD on MWF HD, DM2, HTN. Recent admission for covid and encephalopathy. Discharged 9/19 on 3L O2 and oral steroids. Returned 9/21 with abd pain, worsening hypoxia and infiltrates, CT abd with colitis, and severe sepsis with septic shock  admitted yesterday to ICU on bipap.  Oxygenation worsened throughouot the nigth and she was intubated earlier this morning. Was already on pressors prior to intubation and subsequently had vaso, joanne, and then epi drips added. BP now acceptable and weaning epi. ABG showing severe acidosis mostly due to respiratory acidosis with a mild metabolic acidosis. Vent support as high as possible with RR 32 and Ppl in the mid to upper 30's. Given bicarb pushes and bicarb drip started. Advance Directive: No       Code Status:  DNR            Health Care Power of : pt daughter is decision maker    Past Medical History:   Diagnosis Date    Asthma     COVID-19 9/17/2021    Diabetes ([facility]     niddm    Diabetes mellitus type 2, controlled ([facility] 4/9/2021    Hypertension       Past Surgical History:   Procedure Laterality Date    IR INSERT TUNL CVC W/O PORT OVER 5 YR  4/12/2021     Family History   Problem Relation Age of Onset    No Known Problems Mother     No Known Problems Father       Social History     Tobacco Use    Smoking status: Current Every Day Smoker     Packs/day: 1.00     Years: 32.00     Pack years: 32.00   Substance Use Topics    Alcohol use: No     Prior to Admission medications    Medication Sig Start Date End Date Taking? Authorizing Provider   dexAMETHasone (DECADRON) 6 mg tablet Take 1 Tablet by mouth daily. 9/19/21   Aurea Santos MD   gabapentin (NEURONTIN) 100 mg capsule Take 1 Capsule by mouth three (3) times daily. 7/24/21   Sander Murry III, MD   famotidine (Pepcid) 20 mg tablet Take 1 Tab by mouth daily. 4/16/21   Guicho Galdamez NP   NIFEdipine ER (PROCARDIA XL) 90 mg ER tablet Take 1 Tab by mouth daily. 4/16/21   Guicho Galdamez NP   acetaminophen (TYLENOL) 325 mg tablet Take 2 Tabs by mouth every six (6) hours as needed for Pain or Fever. 4/15/21   Guicho Galdamez NP   ferrous sulfate 325 mg (65 mg iron) tablet Take 2 Tabs by mouth two (2) times daily (with meals). 4/15/21   Awa AMBRIZ NP   lisinopriL (PRINIVIL, ZESTRIL) 20 mg tablet Take 1 Tab by mouth two (2) times a day. 4/15/21   Kraig Denise NP   carvediloL (COREG) 25 mg tablet Take 25 mg by mouth two (2) times daily (with meals). Provider, Historical       No Known Allergies     Review of systems unable to obtain     Objective:     Visit Vitals  BP (!) 107/47   Pulse (!) 116   Temp 98.1 °F (36.7 °C)   Resp (!) 32   Ht 5' 3\" (1.6 m)   Wt 140 lb (63.5 kg)   SpO2 90%   BMI 24.80 kg/m²        Physical Exam:    General:  unresponsive. Eyes:  Conjunctivae/corneas clear    Nose: Nares normal. Septum midline. Neck: Supple, symmetrical, trachea midline, no JVD   Lungs:   Coarse bilateral bs   Heart:  Regular rate and rhythm, no murmur    Abdomen:   Soft, non-tender, non-distended. Positive bowel sounds   Extremities: Normal, atraumatic, no cyanosis or edema   Skin: Skin color, texture, turgor normal. No rash or lesions.    Neurologic: unresponsive   Psych: unresponsive      Assessment:     Hospital Problems  Date Reviewed: 4/9/2021        Codes Class Noted POA    Abdominal pain ICD-10-CM: R10.9  ICD-9-CM: 789.00  9/21/2021 Unknown        Lactic acidosis ICD-10-CM: E87.2  ICD-9-CM: 276.2  9/21/2021 Unknown        Leukocytosis ICD-10-CM: D72.829  ICD-9-CM: 288.60  9/21/2021 Unknown        Eosinophilia ICD-10-CM: D72.10  ICD-9-CM: 288.3  9/21/2021 Unknown        Severe sepsis with septic shock [facility]) ICD-10-CM: A41.9, R65.21  ICD-9-CM: 038.9, 995.92, 785.52  9/21/2021 Unknown        Colitis ICD-10-CM: K52.9  ICD-9-CM: 558.9  9/21/2021 Unknown        Pneumonia due to COVID-19 virus ICD-10-CM: U07.1, J12.82  ICD-9-CM: 480.8, 079.89  9/17/2021 Yes        ESRD (end stage renal disease) ([facility] ICD-10-CM: N18.6  ICD-9-CM: 585.6  7/24/2021 Yes        Acute hypoxemic respiratory failure ([facility] ICD-10-CM: J96.01  ICD-9-CM: 518.81  4/9/2021 Yes        Hypertension ICD-10-CM: I10  ICD-9-CM: 401.9  4/9/2021 Yes        Diabetes mellitus type 2, controlled ([facility] ICD-10-CM: E11.9  ICD-9-CM: 250.00  4/9/2021 Yes              Signed By: Jose E Apodaca Dann Brito MD     September 22, 2021

## 2021-09-22 NOTE — PROGRESS NOTES
Pt made comfort care by family. Drips stopped. Pt pronounced dead at 11:41 with flatline ECG and no pulse or respirations.     Mary Stallworth MD

## 2021-09-22 NOTE — PROGRESS NOTES
Md rolon notified of pts decreasing condition. BP 75/35 maxed on levo, joanne, vaso, and epi.  Orders given to consult palative care and to push one amp bi-carb

## 2021-09-22 NOTE — DISCHARGE SUMMARY
DISCHARGE NOTE    Sri Fleming  Admission date:  2021  Discharge date:  2021    Admitting Diagnosis:  Abdominal pain [R10.9]    Discharge Diagnoses:    Hospital Problems  Date Reviewed: 2021        Codes Class Noted POA    Abdominal pain ICD-10-CM: R10.9  ICD-9-CM: 789.00  2021 Unknown        Lactic acidosis ICD-10-CM: E87.2  ICD-9-CM: 276.2  2021 Unknown        Leukocytosis ICD-10-CM: D72.829  ICD-9-CM: 288.60  2021 Unknown        Eosinophilia ICD-10-CM: D72.10  ICD-9-CM: 288.3  2021 Unknown        Severe sepsis with septic shock [facility]) ICD-10-CM: A41.9, R65.21  ICD-9-CM: 038.9, 995.92, 785.52  2021 Unknown        Colitis ICD-10-CM: K52.9  ICD-9-CM: 558.9  2021 Unknown        Pneumonia due to COVID-19 virus ICD-10-CM: U07.1, J12.82  ICD-9-CM: 480.8, 079.89  2021 Yes        ESRD (end stage renal disease) ([facility] ICD-10-CM: N18.6  ICD-9-CM: 585.6  2021 Yes        Acute hypoxemic respiratory failure ([address] ICD-10-CM: J96.01  ICD-9-CM: 518.81  2021 Yes        Hypertension ICD-10-CM: I10  ICD-9-CM: 401.9  2021 Yes        Diabetes mellitus type 2, controlled ([facility] ICD-10-CM: E11.9  ICD-9-CM: 250.00  2021 Yes              Consultants: Nephrology, PC    Studies/Procedures: CVL placement, arterial line placement, intubation/mechanical ventilation      Condition on Discharge:      [place]  [facility] [place]      [place] Illness:     Patient is a 57 y.o.  female with a history of ESRD on MWF dialysis, DM2, HTN. She was just admitted here from 9/17-. During that admission she was admitted with confusion, poor appetite, cough, SOB. COVID test was positive at that time. She was treated with IV steroids. She was on 2L o2 and 2 days later was discharged home needing 3L o2.      She was home 2 days and returned to the ER earlier today with abd pain and vomiting. She arrived on RA satting in the 52's.  She was placed on NRB but sats remained in the low 90's so she was transitioned to bipap. She received dialysis in the ER and then was admitted by the hospitalist service, though with her needing to be on bipap was admitted to the ICU with pulmonary consult.      A CT scan of the abdomen has been performed showing infiltrates in the B lung bases, more dense in the RLL, as well as signs of colitis of the right hemicolon. Interestingly, she has developed a peripheral eosinophilia with 20% eos. She was started on vanc and zosyn as well as steroids for her ongoing covid infection.      She is minimally responsive, will barely open eyes to physical stimuli. Has been started on levophed since arrival to the [address] [place] [address] course:    Patient was admitted yesterday to ICU on bipap. Developed shock state and started on pressors. Oxygenation worsened throughout the night and she was intubated earlier this morning. Was already on pressors prior to intubation and subsequently had vaso, joanne, and then epi drips added. ABG showing severe acidosis mostly due to respiratory acidosis with a mild metabolic acidosis. Vent support as high as possible with RR 32 and Ppl in the mid to upper 30's. Given bicarb pushes and bicarb drip started. Family was called in to see her. She deteriorated over the course of the morning and remained of 4 pressors including an epi drip. After PC discussion with the family, she was made comfort care and passed away peacefully at 11:41 AM.    Condition on Discharge:           More than 50% of the time documented was spent in face-to-face contact with the patient and in the care of the patient on the floor/unit where the patient is located.     Andre Mejia MD

## 2021-09-22 NOTE — PROGRESS NOTES
Subjective:   Daily Progress Note: 9/22/2021 9:53 AM    Events reviewed.     F/U ESRD      Current Facility-Administered Medications   Medication Dose Route Frequency    morphine injection 2 mg  2 mg IntraVENous Q2H PRN    dexmedeTOMidine in 0.9 % NaCl (PRECEDEX) 400 mcg/100 mL (4 mcg/mL) infusion soln  0.1-1.5 mcg/kg/hr IntraVENous TITRATE    fentaNYL in normal saline (pf) 25 mcg/mL infusion  0-200 mcg/hr IntraVENous TITRATE    vasopressin (VASOSTRICT) 20 Units in 0.9% sodium chloride 100 mL infusion  0-0.1 Units/min IntraVENous TITRATE    PHENYLephrine (KIERRA-SYNEPHRINE) 30 mg in 0.9% sodium chloride 250 mL infusion   mcg/min IntraVENous TITRATE    midazolam in normal saline (VERSED) 1 mg/mL infusion  0-10 mg/hr IntraVENous TITRATE    epoprostenol (FLOLAN) 30,000 ng/mL in diluent for epoprostenol (gly) 50 mL solution  10-50 ng/kg/min (Ideal) Nebulization TITRATE    0.9% Sodium Chloride for Flolan Infusion  0-8 mL/hr Nebulization TITRATE    sodium bicarbonate (8.4%) 150 mEq in dextrose 5% 1,000 mL infusion   IntraVENous CONTINUOUS    EPINEPHrine (ADRENALIN) 4 mg in 0.9% sodium chloride 250 mL infusion  1-10 mcg/min IntraVENous TITRATE    vancomycin (VANCOCIN) 500 mg in 0.9% sodium chloride (MBP/ADV) 100 mL MBP  500 mg IntraVENous ONCE    vasopressin (VASOSTRICT) 40 Units in 0.9% sodium chloride 40 mL infusion  0-0.1 Units/min IntraVENous TITRATE    sodium chloride (NS) flush 5-10 mL  5-10 mL IntraVENous Q8H    sodium chloride (NS) flush 5-10 mL  5-10 mL IntraVENous PRN    famotidine (PEPCID) tablet 20 mg  20 mg Oral DAILY    zinc sulfate (ZINCATE) 50 mg zinc (220 mg) capsule 1 Capsule  1 Capsule Oral DAILY    insulin lispro (HUMALOG) injection   SubCUTAneous AC&HS    heparin (porcine) injection 5,000 Units  5,000 Units SubCUTAneous Q12H    dexamethasone (DECADRON) 10 mg/mL injection 6 mg  6 mg IntraVENous Q24H    ascorbic acid (vitamin C) (VITAMIN C) tablet 2,000 mg  2,000 mg Oral DAILY    hydrALAZINE (APRESOLINE) 20 mg/mL injection 10 mg  10 mg IntraVENous Q6H PRN    acetaminophen (TYLENOL) tablet 650 mg  650 mg Oral Q6H PRN    Or    acetaminophen (TYLENOL) suppository 650 mg  650 mg Rectal Q6H PRN    sodium chloride (NS) flush 5-40 mL  5-40 mL IntraVENous Q8H    sodium chloride (NS) flush 5-40 mL  5-40 mL IntraVENous PRN    polyethylene glycol (MIRALAX) packet 17 g  17 g Oral DAILY PRN    ondansetron (ZOFRAN ODT) tablet 4 mg  4 mg Oral Q8H PRN    Or    ondansetron (ZOFRAN) injection 4 mg  4 mg IntraVENous Q6H PRN    heparin (porcine) 1,000 unit/mL injection 2,000 Units  2,000 Units Hemodialysis DIALYSIS PRN    acetylcysteine (MUCOMYST) 100 mg/mL (10 %) nebulizer solution 200 mg  2 mL Nebulization BID RT    cholecalciferol (VITAMIN D3) (1000 Units /25 mcg) tablet 2,000 Units  2,000 Units Oral DAILY    albuterol-ipratropium (DUO-NEB) 2.5 MG-0.5 MG/3 ML  3 mL Nebulization Q6H PRN    piperacillin-tazobactam (ZOSYN) 3.375 g in 0.9% sodium chloride (MBP/ADV) 100 mL MBP  3.375 g IntraVENous Q12H    midazolam (VERSED) 1 mg/mL injection        fentaNYL citrate (PF) 50 mcg/mL injection        Vancomycin Intermittent Dosing   Other Rx Dosing/Monitoring    NOREPINephrine (LEVOPHED) 16,000 mcg in dextrose 5% 250 mL infusion  0.5-30 mcg/min IntraVENous TITRATE         Objective:     Visit Vitals  BP (!) 107/47   Pulse (!) 117   Temp 98.1 °F (36.7 °C)   Resp (!) 32   Ht 5' 3\" (1.6 m)   Wt 63.5 kg (140 lb)   SpO2 (!) 88%   BMI 24.80 kg/m²    O2 Flow Rate (L/min): 6 l/min O2 Device: Ventilator, Endotracheal tube    Temp (24hrs), Av.7 °F (37.1 °C), Min:98.1 °F (36.7 °C), Max:99.6 °F (37.6 °C)      No intake/output data recorded.   1 -  0700  In: 2813.1 [I.V.:2813.1]  Out: 1000       Visit Vitals  BP (!) 107/47   Pulse (!) 117   Temp 98.1 °F (36.7 °C)   Resp (!) 32   Ht 5' 3\" (1.6 m)   Wt 63.5 kg (140 lb)   SpO2 (!) 88%   BMI 24.80 kg/m²   Gen: Proned  Head:ETT  Lungs: clear symmetric  Heart: regular on monitor  Abdomen proned  Extremities: no edema          Data Review    Recent Results (from the past 48 hour(s))   LACTIC ACID    Collection Time: 09/21/21 11:03 AM   Result Value Ref Range    Lactic acid 2.7 (H) 0.4 - 2.0 MMOL/L   CBC WITH AUTOMATED DIFF    Collection Time: 09/21/21 11:03 AM   Result Value Ref Range    WBC 13.8 (H) 4.3 - 11.1 K/uL    RBC 3.52 (L) 4.05 - 5.2 M/uL    HGB 10.4 (L) 11.7 - 15.4 g/dL    HCT 32.5 (L) 35.8 - 46.3 %    MCV 92.3 79.6 - 97.8 FL    MCH 29.5 26.1 - 32.9 PG    MCHC 32.0 31.4 - 35.0 g/dL    RDW 18.8 (H) 11.9 - 14.6 %    PLATELET 097 872 - 764 K/uL    MPV 11.0 9.4 - 12.3 FL    ABSOLUTE NRBC 0.13 0.0 - 0.2 K/uL    NEUTROPHILS 55 43 - 78 %    LYMPHOCYTES 9 (L) 13 - 44 %    MONOCYTES 9 4.0 - 12.0 %    EOSINOPHILS 20 (H) 0.5 - 7.8 %    BASOPHILS 1 0.0 - 2.0 %    IMMATURE GRANULOCYTES 6 (H) 0.0 - 5.0 %    ABS. NEUTROPHILS 7.7 1.7 - 8.2 K/UL    ABS. LYMPHOCYTES 1.2 0.5 - 4.6 K/UL    ABS. MONOCYTES 1.2 0.1 - 1.3 K/UL    ABS. EOSINOPHILS 2.8 (H) 0.0 - 0.8 K/UL    ABS. BASOPHILS 0.1 0.0 - 0.2 K/UL    ABS. IMM. GRANS. 0.8 (H) 0.0 - 0.5 K/UL    RBC COMMENTS NORMOCYTIC/NORMOCHROMIC      WBC COMMENTS Result Confirmed By Smear      PLATELET COMMENTS ADEQUATE      DF AUTOMATED     METABOLIC PANEL, COMPREHENSIVE    Collection Time: 09/21/21 11:03 AM   Result Value Ref Range    Sodium 129 (L) 136 - 145 mmol/L    Potassium 5.0 3.5 - 5.1 mmol/L    Chloride 91 (L) 98 - 107 mmol/L    CO2 19 (L) 21 - 32 mmol/L    Anion gap 19 (H) 7 - 16 mmol/L    Glucose 318 (H) 65 - 100 mg/dL     (H) 8 - 23 MG/DL    Creatinine 13.30 (H) 0.6 - 1.0 MG/DL    GFR est AA 4 (L) >60 ml/min/1.73m2    GFR est non-AA 3 (L) >60 ml/min/1.73m2    Calcium 8.1 (L) 8.3 - 10.4 MG/DL    Bilirubin, total 0.8 0.2 - 1.1 MG/DL    ALT (SGPT) 15 12 - 65 U/L    AST (SGOT) 32 15 - 37 U/L    Alk.  phosphatase 65 50 - 136 U/L    Protein, total 7.9 6.3 - 8.2 g/dL    Albumin 2.7 (L) 3.2 - 4.6 g/dL    Globulin 5.2 (H) 2.3 - 3.5 g/dL    A-G Ratio 0.5 (L) 1.2 - 3.5     PROCALCITONIN    Collection Time: 09/21/21 11:03 AM   Result Value Ref Range    Procalcitonin 137.19 ng/mL   C REACTIVE PROTEIN, QT    Collection Time: 09/21/21 11:03 AM   Result Value Ref Range    C-Reactive protein 16.0 (H) 0.0 - 0.9 mg/dL   LIPASE    Collection Time: 09/21/21 11:03 AM   Result Value Ref Range    Lipase 94 73 - 393 U/L   PATHOLOGIST REVIEW SMEARS    Collection Time: 09/21/21 11:03 AM   Result Value Ref Range    PATHOLOGIST REVIEW PENDING    EKG, 12 LEAD, INITIAL    Collection Time: 09/21/21 11:11 AM   Result Value Ref Range    Ventricular Rate 100 BPM    Atrial Rate 100 BPM    P-R Interval 142 ms    QRS Duration 66 ms    Q-T Interval 346 ms    QTC Calculation (Bezet) 446 ms    Calculated P Axis 81 degrees    Calculated R Axis -9 degrees    Calculated T Axis 86 degrees    Diagnosis       !! AGE AND GENDER SPECIFIC ECG ANALYSIS !!   Normal sinus rhythm  Possible Left atrial enlargement  Borderline ECG  When compared with ECG of 17-SEP-2021 06:47,  No significant change was found  Confirmed by Tho Hair MD (), JOSÉ MIGUEL PACHECO ([id number]) on 9/21/2021 1:54:51 PM     URINE MICROSCOPIC    Collection Time: 09/21/21 11:57 AM   Result Value Ref Range    WBC 20-50 0 /hpf    RBC 0-3 0 /hpf    Epithelial cells 0-3 0 /hpf    Bacteria TRACE 0 /hpf    Casts 0 0 /lpf    Crystals, urine 0 0 /LPF    Mucus 0 0 /lpf    Other observations RESULTS VERIFIED MANUALLY     LACTIC ACID    Collection Time: 09/21/21  1:17 PM   Result Value Ref Range    Lactic acid 2.5 (H) 0.4 - 2.0 MMOL/L   GLUCOSE, POC    Collection Time: 09/21/21  6:02 PM   Result Value Ref Range    Glucose (POC) 162 (H) 65 - 100 mg/dL    Performed by MalaneyGinaMSN    BLOOD GAS, ARTERIAL POC    Collection Time: 09/21/21  6:16 PM   Result Value Ref Range    Device: BIPAP MASK      FIO2 (POC) 100 %    pH (POC) 7.38 7.35 - 7.45      pCO2 (POC) 43.9 35 - 45 MMHG    pO2 (POC) 80 75 - 100 MMHG    HCO3 (POC) 25.9 22 - 26 MMOL/L    sO2 (POC) 95.5 95 - 98 %    Base excess (POC) 0.5 mmol/L    PEEP/CPAP (POC) 12 cmH2O    PIP (POC) 17      Pressure support 4 cmH2O    Allens test (POC) Positive      Total resp.  rate 33      Site RIGHT BRACHIAL      Specimen type (POC) ARTERIAL      Performed by Kyaw     CO2, POC 26 (H) 13 - 23 MMOL/L   LACTIC ACID    Collection Time: 09/21/21  6:42 PM   Result Value Ref Range    Lactic acid 3.7 (HH) 0.4 - 2.0 MMOL/L   METABOLIC PANEL, BASIC    Collection Time: 09/21/21  6:42 PM   Result Value Ref Range    Sodium 140 136 - 145 mmol/L    Potassium 4.5 3.5 - 5.1 mmol/L    Chloride 100 98 - 107 mmol/L    CO2 23 21 - 32 mmol/L    Anion gap 17 (H) 7 - 16 mmol/L    Glucose 187 (H) 65 - 100 mg/dL    BUN 51 (H) 8 - 23 MG/DL    Creatinine 7.41 (H) 0.6 - 1.0 MG/DL    GFR est AA 7 (L) >60 ml/min/1.73m2    GFR est non-AA 6 (L) >60 ml/min/1.73m2    Calcium 8.2 (L) 8.3 - 10.4 MG/DL   GLUCOSE, POC    Collection Time: 09/21/21  9:49 PM   Result Value Ref Range    Glucose (POC) 180 (H) 65 - 100 mg/dL    Performed by Sergeistoriana    BLOOD GAS, ARTERIAL POC    Collection Time: 09/22/21  3:07 AM   Result Value Ref Range    Device: BAGGING      FIO2 (POC) 100 %    pH (POC) 7.26 (L) 7.35 - 7.45      pCO2 (POC) 44.3 35 - 45 MMHG    pO2 (POC) 54 (L) 75 - 100 MMHG    HCO3 (POC) 20.0 (L) 22 - 26 MMOL/L    sO2 (POC) 82.3 (L) 95 - 98 %    Base deficit (POC) 6.6 mmol/L    PEEP/CPAP (POC) 12 cmH2O    PIP (POC) 20      Allens test (POC) Negative      Inspiratory Time 1 sec    Site DRAWN FROM ARTERIAL LINE      Specimen type (POC) ARTERIAL      Performed by Sarah     Respiratory comment: 18.3    CBC W/O DIFF    Collection Time: 09/22/21  4:07 AM   Result Value Ref Range    WBC 2.2 (L) 4.3 - 11.1 K/uL    RBC 3.08 (L) 4.05 - 5.2 M/uL    HGB 9.0 (L) 11.7 - 15.4 g/dL    HCT 28.7 (L) 35.8 - 46.3 %    MCV 93.2 79.6 - 97.8 FL    MCH 29.2 26.1 - 32.9 PG    MCHC 31.4 31.4 - 35.0 g/dL    RDW 18.6 (H) 11.9 - 14.6 %    PLATELET 554 (L) 331 - 450 K/uL    MPV 11.1 9.4 - 12.3 FL    ABSOLUTE NRBC 0.40 (H) 0.0 - 0.2 K/uL   METABOLIC PANEL, COMPREHENSIVE    Collection Time: 09/22/21  4:07 AM   Result Value Ref Range    Sodium 142 136 - 145 mmol/L    Potassium 4.4 3.5 - 5.1 mmol/L    Chloride 106 98 - 107 mmol/L    CO2 20 (L) 21 - 32 mmol/L    Anion gap 16 7 - 16 mmol/L    Glucose 192 (H) 65 - 100 mg/dL    BUN 55 (H) 8 - 23 MG/DL    Creatinine 7.97 (H) 0.6 - 1.0 MG/DL    GFR est AA 7 (L) >60 ml/min/1.73m2    GFR est non-AA 5 (L) >60 ml/min/1.73m2    Calcium 7.0 (L) 8.3 - 10.4 MG/DL    Bilirubin, total 0.9 0.2 - 1.1 MG/DL    ALT (SGPT) 18 12 - 65 U/L    AST (SGOT) 86 (H) 15 - 37 U/L    Alk. phosphatase 43 (L) 50 - 136 U/L    Protein, total 5.4 (L) 6.3 - 8.2 g/dL    Albumin 1.7 (L) 3.2 - 4.6 g/dL    Globulin 3.7 (H) 2.3 - 3.5 g/dL    A-G Ratio 0.5 (L) 1.2 - 3.5     MAGNESIUM    Collection Time: 09/22/21  4:07 AM   Result Value Ref Range    Magnesium 2.0 1.8 - 2.4 mg/dL   LACTIC ACID    Collection Time: 09/22/21  4:07 AM   Result Value Ref Range    Lactic acid 4.3 (HH) 0.4 - 2.0 MMOL/L   BLOOD GAS, ARTERIAL POC    Collection Time: 09/22/21  5:25 AM   Result Value Ref Range    Device: ADULT VENT      FIO2 (POC) 100 %    pH (POC) 7.14 (LL) 7.35 - 7.45      pCO2 (POC) 61.3 (HH) 35 - 45 MMHG    pO2 (POC) 51 (L) 75 - 100 MMHG    HCO3 (POC) 21.1 (L) 22 - 26 MMOL/L    sO2 (POC) 73.3 (L) 95 - 98 %    Base deficit (POC) 8.0 mmol/L    Mode ASSIST CONTROL      Tidal volume 330 ml    PEEP/CPAP (POC) 10 cmH2O    Allens test (POC) NOT APPLICABLE      Total resp.  rate 28      Site DRAWN FROM ARTERIAL LINE      Specimen type (POC) ARTERIAL      Performed by Willis     Critical value read back DRGREER     Respiratory comment: Ve9.3    BLOOD GAS & LYTES, ARTERIAL    Collection Time: 09/22/21  8:29 AM   Result Value Ref Range    pH (POC) 7.48 (H) 7.35 - 7.45      pCO2 (POC) 38.2 35 - 45 MMHG    pO2 (POC) 42 (LL) 75 - 100 MMHG    Sodium, POC 147 (H) 136 - 145 MMOL/L    Potassium, POC 3.9 3.5 - 5.1 MMOL/L    Calcium, ionized (POC) 0.76 (L) 1.12 - 1.32 mmol/L    Glucose,  (H) 65 - 100 MG/DL    Base excess (POC) 4.3 mmol/L    HCO3 (POC) 28.1 (H) 22 - 26 MMOL/L    CO2, POC 28 (H) 13 - 23 MMOL/L    O2 SAT 81 %    Sample source ARTERIAL      SITE DRAWN FROM ARTERIAL LINE      ESTRELLA'S TEST NOT APPLICABLE      Device: ADULT VENT      MODE Volume Control      FIO2 100 %    Tidal volume 340      PEEP/CPAP 12      Respiratory Rate 32      Performed by Micaela     GFRAA, POC Cannot be calculated >60 ml/min/1.73m2    GFRNA, POC Cannot be calculated >60 ml/min/1.73m2    Critical value read back DEVINRN     Respiratory comment: ve10.7    GLUCOSE, POC    Collection Time: 09/22/21  8:36 AM   Result Value Ref Range    Glucose (POC) 188 (H) 65 - 100 mg/dL    Performed by Po Knowles        Assessment     Patient Active Problem List    Diagnosis Date Noted    Abdominal pain 09/21/2021    Lactic acidosis 09/21/2021    Leukocytosis 09/21/2021    Eosinophilia 09/21/2021    Severe sepsis with septic shock ([facility] [address] 09/21/2021    Colitis 09/21/2021    Pneumonia due to COVID-19 virus 09/17/2021    Hyperkalemia 07/24/2021    ESRD (end stage renal disease) ([facility] [address] 07/24/2021    Acute hypoxemic respiratory failure ([facility] [address] 04/09/2021    CKD (chronic kidney disease) 04/09/2021    Hypertension 04/09/2021    Diabetes mellitus type 2, controlled ([facility] [address] 04/09/2021           Problems Addressed by Nephrology       1. ESRD normally at [facility] with a covid slot at 1300  TTS. 2. COVID 19 +/ acute respiratory failure with hypoxemia  -cxr bilateral Insterstitial opacities. - Intubated, oxygenating poorly     3. Septic shock on max pressors     Unfortunately grave situation. Very little to do.  Too sick for dialysis and no current indication.

## 2021-09-22 NOTE — PROGRESS NOTES
Md Erin Arroyo at bedside to preannounce pt. Family is visible distraught at bedside. Staff will continue to monitor family closely.

## 2021-09-22 NOTE — PROCEDURES
Central Venous Catheter (CVC, Central Line) Placement  Date: 9-21-21  Time: 2200      Indication: Hemodynamic monitoring/Intravenous access  A time-out was completed verifying correct patient, procedure, site, positioning, and special equipment if applicable. The patient was placed in a dependent position appropriate for central line placement based on the vein to be cannulated. The patients right neck/shoulder/groin was prepped and draped in sterile fashion. 1% Lidocaine was used to anesthetize the surrounding skin area. A quad lumen 8.5 [employer] [place] [employer] catheter was introduced into the the right internal jugular vein using the Seldinger technique and under ultrasound guidance. The catheter was threaded smoothly over the guide wire and appropriate blood return was obtained. Each lumen of the catheter was evacuated of air and flushed with sterile saline. The catheter was then sutured in place to the skin and a sterile dressing applied. Perfusion to the extremity distal to the point of catheter insertion was checked and found to be adequate. Estimated Blood Loss: 5cc  The patient tolerated the procedure well and there were no complications. pcxr pending for placement.

## 2021-09-22 NOTE — PROGRESS NOTES
0110: Patient restless and moaning c/o pain but can't specify where. NP Sho notified and orders to give 2 mg Morphine q2 hrs PRN. 2 mg morphine given with increased hypotension. Levophed gtt increased. 0159: Patient continues to be restless. DR. Randon Saint notified. Orders for Precedex gtt received along with 1 L bolus NS.    0258: Precedex gtt stopped d/t increased hypotension.  Levo @ 30 mcg/min

## 2021-09-22 NOTE — PROGRESS NOTES
Pt readmitted abdominal pain 9/21with known Covid 23, and ESRD (discharged 9/19). Pt was aggressively treated with HD, pressors, and intubation. Despite all efforts, pt was transitioned to comfort care when family members arrived. Pt pronounced at 1141.

## 2021-09-22 NOTE — CONSULTS
History and Physical Initial Visit NOTE           9/21/2021    Eddie Mccabe                        Date of Admission:  9/21/2021    The patient's chart is reviewed and the patient is discussed with the staff. Subjective:     Patient is a 57 y.o.  female seen and evaluated at the request of Dr. Princess Lerma. She has a history of ESRD on MWF dialysis, DM2, HTN. She was just admitted here from 9/17-19. During that admission she was admitted with confusion, poor appetite, cough, SOB. COVID test was positive at that time. She was treated with IV steroids. She was on 2L o2 and 2 days later was discharged home needing 3L o2. She was home 2 days and returned to the ER earlier today with abd pain and vomiting. She arrived on RA satting in the 52's. She was placed on NRB but sats remained in the low 90's so she was transitioned to bipap. She received dialysis in the ER and then was admitted by the hospitalist service, though with her needing to be on bipap was admitted to the ICU with pulmonary consult. A CT scan of the abdomen has been performed showing infiltrates in the B lung bases, more dense in the RLL, as well as signs of colitis of the right hemicolon. Interestingly, she has developed a peripheral eosinophilia with 20% eos. She was started on vanc and zosyn as well as steroids for her ongoing covid infection. She is minimally responsive, will barely open eyes to physical stimuli. Has been started on levophed since arrival to the ICU. Review of Systems  Review of systems not obtained due to patient factors. Prior to Admission Medications   Prescriptions Last Dose Informant Patient Reported? Taking? NIFEdipine ER (PROCARDIA XL) 90 mg ER tablet   No No   Sig: Take 1 Tab by mouth daily. acetaminophen (TYLENOL) 325 mg tablet   No No   Sig: Take 2 Tabs by mouth every six (6) hours as needed for Pain or Fever.    carvediloL (COREG) 25 mg tablet   Yes No   Sig: Take 25 mg by mouth two (2) times daily (with meals). dexAMETHasone (DECADRON) 6 mg tablet   No No   Sig: Take 1 Tablet by mouth daily. famotidine (Pepcid) 20 mg tablet   No No   Sig: Take 1 Tab by mouth daily. ferrous sulfate 325 mg (65 mg iron) tablet   No No   Sig: Take 2 Tabs by mouth two (2) times daily (with meals). gabapentin (NEURONTIN) 100 mg capsule   No No   Sig: Take 1 Capsule by mouth three (3) times daily. glipiZIDE (GLUCOTROL) 5 mg tablet   No No   Sig: Take 0.5 Tablets by mouth daily for 30 days. lisinopriL (PRINIVIL, ZESTRIL) 20 mg tablet   No No   Sig: Take 1 Tab by mouth two (2) times a day. Facility-Administered Medications: None     Past Medical History:   Diagnosis Date    Asthma     COVID-19 9/17/2021    Diabetes ([facility] [address]     niddm    Diabetes mellitus type 2, controlled ([facility] [address] 4/9/2021    Hypertension      Past Surgical History:   Procedure Laterality Date    IR INSERT TUNL CVC W/O PORT OVER 5 YR  4/12/2021     Social History     Socioeconomic History    Marital status: SINGLE     Spouse name: Not on file    Number of children: Not on file    Years of education: Not on file    Highest education level: Not on file   Occupational History    Not on file   Tobacco Use    Smoking status: Current Every Day Smoker     Packs/day: 1.00     Years: 32.00     Pack years: 32.00   Substance and Sexual Activity    Alcohol use: No    Drug use: No    Sexual activity: Not on file   Other Topics Concern    Not on file   Social History Narrative    Not on file     Social Determinants of Health     Financial Resource Strain:     Difficulty of Paying Living Expenses:    Food Insecurity:     Worried About Running Out of Food in the Last Year:     Ran Out of Food in the Last Year:    Transportation Needs:     Lack of Transportation (Medical):      Lack of Transportation (Non-Medical):    Physical Activity:     Days of Exercise per Week:     Minutes of Exercise per Session: Stress:     Feeling of Stress :    Social Connections:     Frequency of Communication with Friends and Family:     Frequency of Social Gatherings with Friends and Family:     Attends [employer] Services:     Active Member of Clubs or Organizations:     Attends Club or Organization Meetings:     Marital Status:    Intimate Partner Violence:     Fear of Current or Ex-Partner:     Emotionally Abused:     Physically Abused:     Sexually Abused:      Family History   Problem Relation Age of Onset    No Known Problems Mother     No Known Problems Father      No Known Allergies  Current Facility-Administered Medications   Medication Dose Route Frequency    sodium chloride (NS) flush 5-10 mL  5-10 mL IntraVENous Q8H    sodium chloride (NS) flush 5-10 mL  5-10 mL IntraVENous PRN    [START ON 9/22/2021] famotidine (PEPCID) tablet 20 mg  20 mg Oral DAILY    [START ON 9/22/2021] zinc sulfate (ZINCATE) 50 mg zinc (220 mg) capsule 1 Capsule  1 Capsule Oral DAILY    insulin lispro (HUMALOG) injection   SubCUTAneous AC&HS    heparin (porcine) injection 5,000 Units  5,000 Units SubCUTAneous Q12H    dexamethasone (DECADRON) 10 mg/mL injection 6 mg  6 mg IntraVENous Q24H    [START ON 9/22/2021] ascorbic acid (vitamin C) (VITAMIN C) tablet 2,000 mg  2,000 mg Oral DAILY    hydrALAZINE (APRESOLINE) 20 mg/mL injection 10 mg  10 mg IntraVENous Q6H PRN    acetaminophen (TYLENOL) tablet 650 mg  650 mg Oral Q6H PRN    Or    acetaminophen (TYLENOL) suppository 650 mg  650 mg Rectal Q6H PRN    sodium chloride (NS) flush 5-40 mL  5-40 mL IntraVENous Q8H    sodium chloride (NS) flush 5-40 mL  5-40 mL IntraVENous PRN    polyethylene glycol (MIRALAX) packet 17 g  17 g Oral DAILY PRN    ondansetron (ZOFRAN ODT) tablet 4 mg  4 mg Oral Q8H PRN    Or    ondansetron (ZOFRAN) injection 4 mg  4 mg IntraVENous Q6H PRN    diatrizoate casey-diatrizoat sod (MD-GASTROVIEW,GASTROGRAFIN) 66-10 % contrast solution 15 mL  15 mL Oral ONCE    heparin (porcine) 1,000 unit/mL injection 2,000 Units  2,000 Units Hemodialysis DIALYSIS PRN    acetylcysteine (MUCOMYST) 100 mg/mL (10 %) nebulizer solution 200 mg  2 mL Nebulization BID RT    [START ON 9/22/2021] cholecalciferol (VITAMIN D3) (1000 Units /25 mcg) tablet 2,000 Units  2,000 Units Oral DAILY    albuterol-ipratropium (DUO-NEB) 2.5 MG-0.5 MG/3 ML  3 mL Nebulization Q6H PRN     Objective:   Blood pressure (!) 70/41, pulse (!) 106, temperature 99.6 °F (37.6 °C), resp. rate 27, height 5' 3\" (1.6 m), weight 140 lb (63.5 kg), SpO2 92 %. Intake/Output Summary (Last 24 hours) at 9/21/2021 2108  Last data filed at 9/21/2021 1745  Gross per 24 hour   Intake    Output 1000 ml   Net -1000 ml     PHYSICAL EXAM   Constitutional:  the patient is well developed and in no acute distress  EENMT:  Sclera clear, pupils equal, oral mucosa moist  Respiratory: Bipap in place, satting mid 90's on 100%. Minimal crackles B  Cardiovascular:  RRR without M,G,R  Gastrointestinal: soft and non-tender; with positive bowel sounds. Musculoskeletal: warm without cyanosis. There is no lower extremity edema. Skin:  no jaundice or rashes, no wounds   Neurologic: somnolent, not participating in exam.      Psychiatric:  somnolent    CXR:  9/21/21      CT Abd:   9/21/21      Recent Labs     09/21/21  1842 09/21/21  1317 09/21/21  1103   WBC  --   --  13.8*   HGB  --   --  10.4*   HCT  --   --  32.5*   PLT  --   --  213   PCT  --   --  137.19   LAC 3.7* 2.5* 2.7*     --  129*   K 4.5  --  5.0     --  91*   CO2 23  --  19*   *  --  318*   BUN 51*  --  101*   CREA 7.41*  --  13.30*   CA 8.2*  --  8.1*   ALB  --   --  2.7*   AST  --   --  32   ALT  --   --  15   AP  --   --  65   CRP  --   --  16.0*     ECHO: No results found for this or any previous visit.      Results     Procedure Component Value Units Date/Time    BLOOD CULTURE [[id number]] Collected: 09/21/21 1317    Order Status: Completed Specimen: Blood Updated: 09/21/21 1400    BLOOD CULTURE [[id number]] Collected: 09/21/21 1106    Order Status: Completed Specimen: Blood Updated: 09/21/21 1136    COVID-19 RAPID TEST [[id number]]  (Abnormal) Collected: 09/17/21 0759    Order Status: Completed Specimen: Nasopharyngeal Updated: 09/17/21 0827     Specimen source NASAL        COVID-19 rapid test Detected        Comment:      The specimen is POSITIVE for SARS-CoV-2, the novel coronavirus associated with COVID-19. This test has been authorized by the FDA under an Emergency Use Authorization (EUA) for use by authorized laboratories. Fact sheet for Healthcare Providers: Convention8digitsdate.co.nz  Fact sheet for Patients: Bon[employer]App[employer]co.nz       Methodology: Isothermal Nucleic Acid Amplification             Inpat Anti-Infectives (From admission, onward)    None        Assessment and Plan:  (Medical Decision Making)   Patient with h/o ESRD, recent admission for confusion, found to be covid positive but discharged 2 days later with mild symptoms and hypoxia on oral steroids. Now back with much worse acute hypoxia compounded by what appears to be acute colitis and eosinophilia. With recent vomiting some of her dense RLL infiltrates may be related to aspiration pneuomonia. It also could be related to eosinophilic pneumonia. Active Problems:    Acute hypoxemic respiratory failure ([address] (4/9/2021)    Currently on bipap 100% Continue. Cause of her infiltrates is likely COVID pneumonia plus possible superimposed bacterial pna from aspiration vs eosinophilic pna/process such as churg laura. Does not appear to need intubation at present but if she does would advocate for bronch with BAL to help evaluate the nature of her infiltrates. Severe sepsis with septic Shock 9/21    Now on levophed. Central lines to be placed and then will check CVP. If low will give IVF boluses. Already getting vanc and zosyn.  Follow up cultures obtained. GI source most likely. Diabetes mellitus type 2, controlled ([facility] [address] (4/9/2021)    SSI      ESRD (end stage renal disease) ([facility] [address] (7/24/2021)    Nephrology following. Was dialyzed in the ER today and usually on MWF schedule. COVID-19 (9/17/2021)    Continue dexamethasone. CRP 16. No obvious contraindication to tocilizumab. Will order. Abdominal pain (9/21/2021)    Colitis seen on CT scan. Cont vanc and zosyn. Lactic acidosis (9/21/2021)    Mild. Due to the above. Leukocytosis (9/21/2021)  With severe eosinophilia    Send stool O&P, blood smear, ANCA level. Full Code    More than 50% of the time documented was spent in face-to-face contact with the patient and in the care of the patient on the floor/unit where the patient is located. Thank you very much for this referral.  We appreciate the opportunity to participate in this patient's care. Will follow along with above stated plan.     Willy Gutierrez MD

## 2021-09-22 NOTE — PROGRESS NOTES
Pts family had syncopal episode at bedside. Code rapid called on visitor. Being transferred to the ED per hospital policy.  Visitor was unresponsive arriving at ED

## 2021-09-22 NOTE — PROGRESS NOTES
CTSP due to severe hypotension with MAP of 35. Pt on 4 drip including Epi. On may arrival was proned. BP after Epi push high. Pt unproned. BP dropping again and family called. POA not yet here. Dr. Catracho Sampson here and discussing hopeless prognosis with available family.     Elena Flores MD

## 2021-09-22 NOTE — ED NOTES
TRANSFER - OUT REPORT:    Verbal report given to Ryan(name) on Rusty Nation  being transferred to ICU(unit) for routine progression of care       Report consisted of patients Situation, Background, Assessment and   Recommendations(SBAR). Information from the following report(s) ED Summary was reviewed with the receiving nurse. Lines:   Peripheral IV Right Antecubital (Active)        Opportunity for questions and clarification was provided.       Patient transported with:   O2 @ 15 liters

## 2021-09-24 LAB
BACTERIA SPEC CULT: ABNORMAL
GRAM STN SPEC: ABNORMAL
SERVICE CMNT-IMP: ABNORMAL

## 2021-09-24 NOTE — PROGRESS NOTES
Spiritual Care Visit, follow up visit. End of life care. Visited with family in [facility]. Prayed for patient on behalf of patient and family. This was a tense, highly emotional time with family. Visit by Thanh Nova, Staff .  Juliana., Th.B., B.A.

## 2021-09-25 LAB
C-ANCA TITR SER IF: NORMAL TITER
MYELOPEROXIDASE AB SER IA-ACNC: <9 U/ML (ref 0–9)
P-ANCA ATYPICAL TITR SER IF: NORMAL TITER
P-ANCA TITR SER IF: NORMAL TITER
PROTEINASE3 AB SER IA-ACNC: <3.5 U/ML (ref 0–3.5)

## 2021-09-26 LAB
BACTERIA SPEC CULT: NORMAL
SERVICE CMNT-IMP: NORMAL

## 2021-12-14 NOTE — PROGRESS NOTES
MSN, CM:  Patient to be discharged home today with no services ordered or requested. Patient is a new HD patient and will start outpatient tomorrow (MWF) at 06:15. Patient made aware of appointment time. Patient agrees with this discharge plan and has met all milestones for this admission. Family to transport patient home. Care Management Interventions Mode of Transport at Discharge: Other (see comment)(family to transport) Transition of Care Consult (CM Consult): Discharge Planning Freedom of Choice List was Provided with Basic Dialogue that Supports the Patient's Individualized Plan of Care/Goals, Treatment Preferences and Shares the Quality Data Associated with the Providers?: Yes Discharge Location Discharge Placement: Home Oxybutynin Pregnancy And Lactation Text: This medication is Pregnancy Category B and is considered safe during pregnancy. It is unknown if it is excreted in breast milk.